# Patient Record
Sex: FEMALE | Race: WHITE | NOT HISPANIC OR LATINO | Employment: FULL TIME | URBAN - METROPOLITAN AREA
[De-identification: names, ages, dates, MRNs, and addresses within clinical notes are randomized per-mention and may not be internally consistent; named-entity substitution may affect disease eponyms.]

---

## 2017-07-17 ENCOUNTER — OFFICE VISIT (OUTPATIENT)
Dept: INTERNAL MEDICINE | Facility: CLINIC | Age: 27
End: 2017-07-17
Payer: MEDICAID

## 2017-07-17 VITALS
BODY MASS INDEX: 39.92 KG/M2 | RESPIRATION RATE: 16 BRPM | HEIGHT: 62 IN | HEART RATE: 95 BPM | WEIGHT: 216.94 LBS | SYSTOLIC BLOOD PRESSURE: 128 MMHG | TEMPERATURE: 98 F | DIASTOLIC BLOOD PRESSURE: 70 MMHG

## 2017-07-17 DIAGNOSIS — Z13.220 SCREENING FOR HYPERLIPIDEMIA: ICD-10-CM

## 2017-07-17 DIAGNOSIS — Z00.00 HEALTHCARE MAINTENANCE: ICD-10-CM

## 2017-07-17 DIAGNOSIS — E07.9 THYROID DISEASE: Primary | ICD-10-CM

## 2017-07-17 PROCEDURE — 99999 PR PBB SHADOW E&M-NEW PATIENT-LVL III: CPT | Mod: PBBFAC,,, | Performed by: NURSE PRACTITIONER

## 2017-07-17 PROCEDURE — 99203 OFFICE O/P NEW LOW 30 MIN: CPT | Mod: PBBFAC | Performed by: NURSE PRACTITIONER

## 2017-07-17 PROCEDURE — 99203 OFFICE O/P NEW LOW 30 MIN: CPT | Mod: S$PBB,,, | Performed by: NURSE PRACTITIONER

## 2017-07-17 RX ORDER — OMEPRAZOLE 20 MG/1
20 CAPSULE, DELAYED RELEASE ORAL DAILY
COMMUNITY
End: 2017-09-20 | Stop reason: SDUPTHER

## 2017-07-17 RX ORDER — MONTELUKAST SODIUM 10 MG/1
10 TABLET ORAL NIGHTLY
COMMUNITY
End: 2021-01-25

## 2017-07-17 NOTE — PROGRESS NOTES
Subjective:       Patient ID: Cortez Rollins is a 26 y.o. female.    Chief Complaint: Establish Care and Depression    HPI: Pt presents to clinic today new to me and clinic. She reports that she is here to get established. She reports that she has had issues with elevated thyroid then low thyroid but has not had it checked in 4 months since she moved. She was having it checked once a month. C/o wt gain, hair loss and anxiety.   Review of Systems   Constitutional: Negative for chills, fatigue, fever and unexpected weight change.   HENT: Negative for congestion, ear pain, sore throat and trouble swallowing.    Eyes: Negative for pain and visual disturbance.   Respiratory: Negative for cough, chest tightness and shortness of breath.    Cardiovascular: Negative for chest pain, palpitations and leg swelling.   Gastrointestinal: Negative for abdominal distention, abdominal pain, constipation, diarrhea and vomiting.   Genitourinary: Negative for difficulty urinating, dysuria, flank pain, frequency and hematuria.   Musculoskeletal: Negative for back pain, gait problem, joint swelling, neck pain and neck stiffness.   Skin: Negative for rash and wound.   Neurological: Negative for dizziness, seizures, speech difficulty, light-headedness and headaches.       Objective:      Physical Exam   Constitutional: She is oriented to person, place, and time. She appears well-developed and well-nourished.   HENT:   Head: Normocephalic and atraumatic.   Right Ear: External ear normal.   Left Ear: External ear normal.   Nose: Nose normal.   Mouth/Throat: Oropharynx is clear and moist.   Eyes: Conjunctivae and EOM are normal. Pupils are equal, round, and reactive to light.   Neck: Normal range of motion. Neck supple.   Cardiovascular: Normal rate, regular rhythm, normal heart sounds and intact distal pulses.    Pulmonary/Chest: Effort normal and breath sounds normal.   Abdominal: Soft. Bowel sounds are normal.   Musculoskeletal: Normal range of  motion.   Neurological: She is alert and oriented to person, place, and time.   Skin: Skin is warm and dry.   Psychiatric: She has a normal mood and affect. Her behavior is normal. Judgment and thought content normal.   Nursing note and vitals reviewed.      Assessment:       1. Thyroid disease    2. Healthcare maintenance    3. Screening for hyperlipidemia        Plan:   Cortez was seen today for establish care and depression.    Diagnoses and all orders for this visit:    Thyroid disease  -     TSH; Future  -     T4, free; Future  -     T3, free; Future    Healthcare maintenance  -     CBC auto differential; Future  -     Comprehensive metabolic panel; Future    Screening for hyperlipidemia  -     Lipid panel; Future    F/u 2 weeks for lab review, consider Wellbutrin for smoking cessation and anxiety.

## 2017-07-24 ENCOUNTER — LAB VISIT (OUTPATIENT)
Dept: LAB | Facility: HOSPITAL | Age: 27
End: 2017-07-24
Attending: NURSE PRACTITIONER
Payer: MEDICAID

## 2017-07-24 DIAGNOSIS — Z13.220 SCREENING FOR HYPERLIPIDEMIA: ICD-10-CM

## 2017-07-24 DIAGNOSIS — E07.9 THYROID DISEASE: ICD-10-CM

## 2017-07-24 DIAGNOSIS — Z00.00 HEALTHCARE MAINTENANCE: ICD-10-CM

## 2017-07-24 LAB
ALBUMIN SERPL BCP-MCNC: 3.5 G/DL
ALP SERPL-CCNC: 57 U/L
ALT SERPL W/O P-5'-P-CCNC: 18 U/L
ANION GAP SERPL CALC-SCNC: 7 MMOL/L
AST SERPL-CCNC: 12 U/L
BASOPHILS # BLD AUTO: 0.02 K/UL
BASOPHILS NFR BLD: 0.2 %
BILIRUB SERPL-MCNC: 0.6 MG/DL
BUN SERPL-MCNC: 9 MG/DL
CALCIUM SERPL-MCNC: 9 MG/DL
CHLORIDE SERPL-SCNC: 109 MMOL/L
CHOLEST/HDLC SERPL: 5.7 {RATIO}
CO2 SERPL-SCNC: 24 MMOL/L
CREAT SERPL-MCNC: 0.6 MG/DL
DIFFERENTIAL METHOD: NORMAL
EOSINOPHIL # BLD AUTO: 0.1 K/UL
EOSINOPHIL NFR BLD: 1.5 %
ERYTHROCYTE [DISTWIDTH] IN BLOOD BY AUTOMATED COUNT: 12.7 %
EST. GFR  (AFRICAN AMERICAN): >60 ML/MIN/1.73 M^2
EST. GFR  (NON AFRICAN AMERICAN): >60 ML/MIN/1.73 M^2
GLUCOSE SERPL-MCNC: 98 MG/DL
HCT VFR BLD AUTO: 42.5 %
HDL/CHOLESTEROL RATIO: 17.6 %
HDLC SERPL-MCNC: 187 MG/DL
HDLC SERPL-MCNC: 33 MG/DL
HGB BLD-MCNC: 14.4 G/DL
LDLC SERPL CALC-MCNC: 132 MG/DL
LYMPHOCYTES # BLD AUTO: 2.9 K/UL
LYMPHOCYTES NFR BLD: 31.2 %
MCH RBC QN AUTO: 30.6 PG
MCHC RBC AUTO-ENTMCNC: 33.9 G/DL
MCV RBC AUTO: 90 FL
MONOCYTES # BLD AUTO: 0.8 K/UL
MONOCYTES NFR BLD: 9 %
NEUTROPHILS # BLD AUTO: 5.4 K/UL
NEUTROPHILS NFR BLD: 58.1 %
NONHDLC SERPL-MCNC: 154 MG/DL
PLATELET # BLD AUTO: 194 K/UL
PMV BLD AUTO: 10.9 FL
POTASSIUM SERPL-SCNC: 3.7 MMOL/L
PROT SERPL-MCNC: 6.4 G/DL
RBC # BLD AUTO: 4.7 M/UL
SODIUM SERPL-SCNC: 140 MMOL/L
T3FREE SERPL-MCNC: 3.1 PG/ML
T4 FREE SERPL-MCNC: 1.04 NG/DL
TRIGL SERPL-MCNC: 110 MG/DL
TSH SERPL DL<=0.005 MIU/L-ACNC: 0.26 UIU/ML
WBC # BLD AUTO: 9.26 K/UL

## 2017-07-24 PROCEDURE — 36415 COLL VENOUS BLD VENIPUNCTURE: CPT

## 2017-07-24 PROCEDURE — 80053 COMPREHEN METABOLIC PANEL: CPT

## 2017-07-24 PROCEDURE — 85025 COMPLETE CBC W/AUTO DIFF WBC: CPT

## 2017-07-24 PROCEDURE — 84439 ASSAY OF FREE THYROXINE: CPT

## 2017-07-24 PROCEDURE — 84481 FREE ASSAY (FT-3): CPT

## 2017-07-24 PROCEDURE — 80061 LIPID PANEL: CPT

## 2017-07-24 PROCEDURE — 84443 ASSAY THYROID STIM HORMONE: CPT

## 2017-07-27 ENCOUNTER — HOSPITAL ENCOUNTER (EMERGENCY)
Facility: HOSPITAL | Age: 27
Discharge: HOME | End: 2017-07-27
Attending: SURGERY
Payer: MEDICAID

## 2017-07-27 VITALS
SYSTOLIC BLOOD PRESSURE: 118 MMHG | WEIGHT: 214 LBS | DIASTOLIC BLOOD PRESSURE: 66 MMHG | TEMPERATURE: 98 F | HEART RATE: 110 BPM | RESPIRATION RATE: 20 BRPM | BODY MASS INDEX: 39.14 KG/M2

## 2017-07-27 DIAGNOSIS — K60.2 ANAL FISSURE: Primary | ICD-10-CM

## 2017-07-27 PROCEDURE — 63600175 PHARM REV CODE 636 W HCPCS: Performed by: SURGERY

## 2017-07-27 PROCEDURE — 96372 THER/PROPH/DIAG INJ SC/IM: CPT

## 2017-07-27 PROCEDURE — 99283 EMERGENCY DEPT VISIT LOW MDM: CPT | Mod: 25

## 2017-07-27 RX ORDER — ONDANSETRON 2 MG/ML
4 INJECTION INTRAMUSCULAR; INTRAVENOUS
Status: COMPLETED | OUTPATIENT
Start: 2017-07-27 | End: 2017-07-27

## 2017-07-27 RX ORDER — HYDROCORTISONE 25 MG/G
CREAM TOPICAL 2 TIMES DAILY
Qty: 1 TUBE | Refills: 0 | Status: SHIPPED | OUTPATIENT
Start: 2017-07-27 | End: 2017-08-06

## 2017-07-27 RX ORDER — METRONIDAZOLE 500 MG/1
500 TABLET ORAL 4 TIMES DAILY
Qty: 28 TABLET | Refills: 0 | Status: SHIPPED | OUTPATIENT
Start: 2017-07-27 | End: 2017-07-31 | Stop reason: SDUPTHER

## 2017-07-27 RX ORDER — HYDROCODONE BITARTRATE AND ACETAMINOPHEN 5; 325 MG/1; MG/1
1 TABLET ORAL EVERY 4 HOURS PRN
Qty: 8 TABLET | Refills: 0 | Status: SHIPPED | OUTPATIENT
Start: 2017-07-27 | End: 2017-08-06

## 2017-07-27 RX ORDER — DOCUSATE SODIUM 100 MG/1
100 CAPSULE, LIQUID FILLED ORAL 2 TIMES DAILY PRN
Qty: 30 CAPSULE | Refills: 0 | Status: SHIPPED | OUTPATIENT
Start: 2017-07-27 | End: 2017-08-17

## 2017-07-27 RX ORDER — HYDROMORPHONE HYDROCHLORIDE 1 MG/ML
1 INJECTION, SOLUTION INTRAMUSCULAR; INTRAVENOUS; SUBCUTANEOUS
Status: COMPLETED | OUTPATIENT
Start: 2017-07-27 | End: 2017-07-27

## 2017-07-27 RX ORDER — IBUPROFEN 600 MG/1
600 TABLET ORAL 3 TIMES DAILY
COMMUNITY
End: 2017-10-18

## 2017-07-27 RX ADMIN — ONDANSETRON 4 MG: 2 INJECTION INTRAMUSCULAR; INTRAVENOUS at 09:07

## 2017-07-27 RX ADMIN — HYDROMORPHONE HYDROCHLORIDE 1 MG: 1 INJECTION, SOLUTION INTRAMUSCULAR; INTRAVENOUS; SUBCUTANEOUS at 09:07

## 2017-07-27 NOTE — ED PROVIDER NOTES
[facility]                                        July 27, 2017                   Chief Complaint  26 y.o. female with Rectal Pain    History of Present Illness  Cortez Rollins presents to the emergency room with rectal pain for last 2 days  Patient had anal intercourse 2 days ago with rectal pain shortly afterwards  Patient on exam has a small anal fissure, painful on evaluation in the ER now  Patient has no bleeding, no signs of abscess or cellulitis, no mass or lesion  Patient has normal sphincter tone with no obvious hemorrhoids and evaluation  Patient reports normal bowel movements, afebrile and stable on presentation     The history is provided by the patient  Medical history: ALLERGY, GERD, thyroid dysfunction  Surgical history: Adenoidectomy, tonsillectomy, cholecystectomy  No Known Allergies     Review of Systems and Physical Exam     Review of Systems  -- Constitution - no fever, denies fatigue, no weakness, no chills  -- Eyes - no tearing or redness, no visual disturbance  -- Ear, Nose - no tinnitus or earache, no nasal congestion or discharge  -- Mouth,Throat - no sore throat, no toothache, normal voice, normal swallowing  -- Respiratory - denies cough and congestion, no shortness of breath, no PETERSON  -- Cardiovascular - denies chest pain, no palpitations, denies claudication  -- Gastrointestinal - rectal pain after anal intercourse 2 days ago  -- Genitourinary - no dysuria, no denies flank pain, no hematuria or frequency   -- Musculoskeletal - denies back pain, negative for myalgias and arthralgias   -- Neurological - no headache, denies weakness or seizure; no LOC  -- Skin - denies pallor, rash, or changes in skin. no hives or welts noted    Vital Signs  -- Her oral temperature is 98.4 °F (36.9 °C).   -- Her blood pressure is 118/66 and her pulse is 110.   -- Her respiration is 20.      Physical Exam  -- Nursing note and vitals reviewed  -- Constitutional: Appears well-developed and  well-nourished  -- Head: Atraumatic. Normocephalic. No obvious abnormality  -- Eyes: Pupils are equal and reactive to light. Normal conjunctiva and lids  -- Cardiac: Normal rate, regular rhythm and normal heart sounds  -- Pulmonary: Normal respiratory effort, breath sounds clear to auscultation  -- Abdominal: Soft, no tenderness. Normal bowel sounds. Normal liver edge  -- Rectal: Anal fissure noted with no active bleeding, no mass or hemorrhoids or bleeding  -- Musculoskeletal: Normal range of motion, no effusions. Joints stable   -- Neurological: No focal deficits. Showed good interaction with staff  -- Vascular: Posterior tibial, dorsalis pedis and radial pulses 2+ bilaterally      Emergency Room Course     Treatment and Evaluation  -- IM 1 mg Dilaudid given today in the ER  -- IM 4 mg Zofran given today in the ER     Diagnosis  -- The encounter diagnosis was Anal fissure.    Disposition and Plan  -- Disposition: home  -- Condition: stable  -- Follow-up: Patient to follow up with Emilia Davis NP at appointment  -- I advised the patient that we have found no life threatening condition today  -- At this time, I believe the patient is clinically stable for discharge.   -- The patient acknowledges that close follow up with a MD is required   -- Patient agrees to comply with all instruction and direction given in the ER    This note is dictated on Dragon Natural Speaking word recognition program.  There are word recognition mistakes that are occasionally missed on review.           Flash Campa MD  07/27/17 0939

## 2017-07-27 NOTE — ED NOTES
No s/s adverse reaction to medications given.  Discharge instructions and rx x4 given, patient and fiance voiced understanding.  Discharged to home in stable condition, ambulatory to discharge window w steady gait, NAD.

## 2017-07-28 ENCOUNTER — PATIENT MESSAGE (OUTPATIENT)
Dept: INTERNAL MEDICINE | Facility: CLINIC | Age: 27
End: 2017-07-28

## 2017-07-28 NOTE — TELEPHONE ENCOUNTER
Yes she can. I see Ibuprofen 600mg TID on her medication list. She can take this as prescribed. Can use her Norco before bedtime if nothing else for now. Thanks    If still bad on Monday let Emilia know. Maybe she could do something like tramadol that still can cause sedation but maybe won't be as strong...

## 2017-07-29 ENCOUNTER — HOSPITAL ENCOUNTER (EMERGENCY)
Facility: HOSPITAL | Age: 27
Discharge: HOME | End: 2017-07-30
Attending: SURGERY
Payer: MEDICAID

## 2017-07-29 VITALS
RESPIRATION RATE: 18 BRPM | BODY MASS INDEX: 39.14 KG/M2 | HEART RATE: 103 BPM | TEMPERATURE: 99 F | WEIGHT: 214 LBS | DIASTOLIC BLOOD PRESSURE: 66 MMHG | SYSTOLIC BLOOD PRESSURE: 120 MMHG

## 2017-07-29 DIAGNOSIS — K62.89 PROCTITIS: Primary | ICD-10-CM

## 2017-07-29 LAB
ALBUMIN SERPL BCP-MCNC: 3.4 G/DL
ALP SERPL-CCNC: 47 U/L
ALT SERPL W/O P-5'-P-CCNC: 17 U/L
ANION GAP SERPL CALC-SCNC: 10 MMOL/L
AST SERPL-CCNC: 14 U/L
BASOPHILS # BLD AUTO: 0.02 K/UL
BASOPHILS NFR BLD: 0.3 %
BILIRUB SERPL-MCNC: 0.3 MG/DL
BUN SERPL-MCNC: 12 MG/DL
CALCIUM SERPL-MCNC: 8.7 MG/DL
CHLORIDE SERPL-SCNC: 107 MMOL/L
CO2 SERPL-SCNC: 24 MMOL/L
CREAT SERPL-MCNC: 0.7 MG/DL
DIFFERENTIAL METHOD: NORMAL
EOSINOPHIL # BLD AUTO: 0.1 K/UL
EOSINOPHIL NFR BLD: 1.4 %
ERYTHROCYTE [DISTWIDTH] IN BLOOD BY AUTOMATED COUNT: 12.7 %
EST. GFR  (AFRICAN AMERICAN): >60 ML/MIN/1.73 M^2
EST. GFR  (NON AFRICAN AMERICAN): >60 ML/MIN/1.73 M^2
GLUCOSE SERPL-MCNC: 97 MG/DL
HCT VFR BLD AUTO: 40.1 %
HGB BLD-MCNC: 13.5 G/DL
LIPASE SERPL-CCNC: 42 U/L
LYMPHOCYTES # BLD AUTO: 1.9 K/UL
LYMPHOCYTES NFR BLD: 23.9 %
MCH RBC QN AUTO: 30.5 PG
MCHC RBC AUTO-ENTMCNC: 33.7 G/DL
MCV RBC AUTO: 91 FL
MONOCYTES # BLD AUTO: 1 K/UL
MONOCYTES NFR BLD: 12.1 %
NEUTROPHILS # BLD AUTO: 4.9 K/UL
NEUTROPHILS NFR BLD: 62.3 %
PLATELET # BLD AUTO: 156 K/UL
PMV BLD AUTO: 11.5 FL
POTASSIUM SERPL-SCNC: 3.1 MMOL/L
PROT SERPL-MCNC: 6.4 G/DL
RBC # BLD AUTO: 4.42 M/UL
SODIUM SERPL-SCNC: 141 MMOL/L
WBC # BLD AUTO: 7.87 K/UL

## 2017-07-29 PROCEDURE — 83690 ASSAY OF LIPASE: CPT

## 2017-07-29 PROCEDURE — 96374 THER/PROPH/DIAG INJ IV PUSH: CPT

## 2017-07-29 PROCEDURE — 85025 COMPLETE CBC W/AUTO DIFF WBC: CPT

## 2017-07-29 PROCEDURE — 99284 EMERGENCY DEPT VISIT MOD MDM: CPT | Mod: 25

## 2017-07-29 PROCEDURE — 51702 INSERT TEMP BLADDER CATH: CPT

## 2017-07-29 PROCEDURE — 36415 COLL VENOUS BLD VENIPUNCTURE: CPT

## 2017-07-29 PROCEDURE — 80053 COMPREHEN METABOLIC PANEL: CPT

## 2017-07-29 RX ORDER — HYDROMORPHONE HYDROCHLORIDE 2 MG/ML
2 INJECTION, SOLUTION INTRAMUSCULAR; INTRAVENOUS; SUBCUTANEOUS
Status: COMPLETED | OUTPATIENT
Start: 2017-07-29 | End: 2017-07-30

## 2017-07-29 RX ORDER — SODIUM CHLORIDE 9 MG/ML
1000 INJECTION, SOLUTION INTRAVENOUS
Status: COMPLETED | OUTPATIENT
Start: 2017-07-29 | End: 2017-07-30

## 2017-07-29 RX ORDER — ONDANSETRON 2 MG/ML
4 INJECTION INTRAMUSCULAR; INTRAVENOUS
Status: COMPLETED | OUTPATIENT
Start: 2017-07-29 | End: 2017-07-30

## 2017-07-29 RX ADMIN — IOHEXOL 30 ML: 350 INJECTION, SOLUTION INTRAVENOUS at 11:07

## 2017-07-30 LAB
AMORPH CRY URNS QL MICRO: NORMAL
B-HCG UR QL: NEGATIVE
BACTERIA #/AREA URNS HPF: NORMAL /HPF
BILIRUB UR QL STRIP: ABNORMAL
CLARITY UR: CLEAR
COLOR UR: ABNORMAL
GLUCOSE UR QL STRIP: NEGATIVE
HGB UR QL STRIP: ABNORMAL
HYALINE CASTS #/AREA URNS LPF: 0 /LPF
KETONES UR QL STRIP: ABNORMAL
LEUKOCYTE ESTERASE UR QL STRIP: ABNORMAL
MICROSCOPIC COMMENT: NORMAL
NITRITE UR QL STRIP: POSITIVE
PH UR STRIP: 7 [PH] (ref 5–8)
PROT UR QL STRIP: ABNORMAL
RBC #/AREA URNS HPF: 2 /HPF (ref 0–4)
SP GR UR STRIP: 1.02 (ref 1–1.03)
SQUAMOUS #/AREA URNS HPF: 2 /HPF
URN SPEC COLLECT METH UR: ABNORMAL
UROBILINOGEN UR STRIP-ACNC: NEGATIVE EU/DL
WBC #/AREA URNS HPF: 2 /HPF (ref 0–5)

## 2017-07-30 PROCEDURE — 63600175 PHARM REV CODE 636 W HCPCS: Performed by: SURGERY

## 2017-07-30 PROCEDURE — 81000 URINALYSIS NONAUTO W/SCOPE: CPT

## 2017-07-30 PROCEDURE — 25500020 PHARM REV CODE 255: Performed by: INTERNAL MEDICINE

## 2017-07-30 PROCEDURE — 25000003 PHARM REV CODE 250: Performed by: SURGERY

## 2017-07-30 PROCEDURE — 81025 URINE PREGNANCY TEST: CPT

## 2017-07-30 RX ORDER — HYDROCODONE BITARTRATE AND ACETAMINOPHEN 5; 325 MG/1; MG/1
TABLET ORAL
Status: DISCONTINUED
Start: 2017-07-30 | End: 2017-07-30 | Stop reason: HOSPADM

## 2017-07-30 RX ORDER — PREDNISONE 20 MG/1
TABLET ORAL
Status: DISCONTINUED
Start: 2017-07-30 | End: 2017-07-30 | Stop reason: HOSPADM

## 2017-07-30 RX ORDER — METRONIDAZOLE 500 MG/1
TABLET ORAL
Status: DISCONTINUED
Start: 2017-07-30 | End: 2017-07-30 | Stop reason: HOSPADM

## 2017-07-30 RX ADMIN — SODIUM CHLORIDE 1000 ML: 0.9 INJECTION, SOLUTION INTRAVENOUS at 12:07

## 2017-07-30 RX ADMIN — IOHEXOL 100 ML: 350 INJECTION, SOLUTION INTRAVENOUS at 01:07

## 2017-07-30 RX ADMIN — ONDANSETRON 4 MG: 2 INJECTION INTRAMUSCULAR; INTRAVENOUS at 12:07

## 2017-07-30 RX ADMIN — HYDROMORPHONE HYDROCHLORIDE 2 MG: 2 INJECTION INTRAMUSCULAR; INTRAVENOUS; SUBCUTANEOUS at 12:07

## 2017-07-30 NOTE — ED TRIAGE NOTES
"Dx w/ anal fissure 7/27. Pt states pressure traveling more towards vagina now.  Reports urine turned brown with "particles" starting last night. Starting around noon today pt has urinary retention, states can only urinate when laying in tub in hot water.  "

## 2017-07-30 NOTE — ED PROVIDER NOTES
Encounter Date: 7/29/2017       History     Chief Complaint    Urinary Retention    Female  Problem     HPI   Cortez Rollins is a 26 y.o. female presents with anal pain and urinary retention today  Patient had anal intercourse 3 days ago with residual sphincter pain after the event  Patient was seen in the emergency room prescribed antibiotics and pain medication  Patient with continued pain, now has dark urine with bladder pressure as well now  Patient states that she has particulate matter in her urine, cannot urinate tonight    The history is provided by the patient  Medical history: ALLERGY, GERD, thyroid dysfunction  Surgical history: Adenoidectomy, tonsillectomy, cholecystectomy  No Known Allergies      Review of Systems and Physical Exam     Review of Systems   · Constitution - no fever, denies fatigue, no weakness, stable weight  · Eyes - no tearing or redness, no change in vision, no double vision  · Ear, Nose - no tinnitus or earache, no nasal congestion or discharge  · Mouth,Throat - no sore throat, no toothache, denies dysphagia, normal swallowing  · Respiratory - denies cough and sputum, no shortness of breath, no PETERSON, no wheeze  · Cardiovascular - denies chest pain, no palpitations, denies claudication. No orthopnea   · Gastrointestinal - abdominal pain, nausea, vomiting, diarrhea, or constipation.   · Genitourinary - dark urine, urinary retention, denies flank pain  · Musculoskeletal - denies muscle or joint pain, back pain  · Skin - denies rash or changes in skin, no hives or welts  · Neurological - no headache, denies weakness or seizure; no LOC    BP Pulse Resp Temp SpO2   120/66 103 18 98.7 °F (37.1 °C) --     Physical Exam   · Constitution - no fever, denies fatigue, no weakness, stable weight  · Eyes - no tearing or redness, no change in vision, no double vision  · Ear, Nose - no tinnitus or earache, no nasal congestion or discharge  · Mouth,Throat - no sore throat, no toothache, denies  dysphagia, normal swallowing  · Respiratory - denies cough and sputum, no shortness of breath, no PETERSON, no wheeze  · Cardiovascular - denies chest pain, no palpitations, denies claudication. No orthopnea   · Gastrointestinal - denies abdominal pain, nausea, vomiting, diarrhea, or constipation.   · Genitourinary - denies flank pain and dysuria, also denies frequency or urgency  · Musculoskeletal - denies muscle or joint pain, back pain  · Skin - denies rash or changes in skin, no hives or welts  · Neurological - no headache, denies weakness or seizure; no LOC    ED Course   Procedures  Labs Reviewed   COMPREHENSIVE METABOLIC PANEL - Abnormal; Notable for the following:        Result Value    Potassium 3.1 (*)     Albumin 3.4 (*)     Alkaline Phosphatase 47 (*)     All other components within normal limits   URINALYSIS - Abnormal; Notable for the following:     Protein, UA 1+ (*)     Ketones, UA 2+ (*)     Bilirubin (UA) 1+ (*)     Occult Blood UA Trace (*)     Nitrite, UA Positive (*)     Leukocytes, UA Trace (*)     All other components within normal limits   CBC W/ AUTO DIFFERENTIAL   LIPASE   PREGNANCY TEST, URINE RAPID   URINALYSIS MICROSCOPIC                               ED Course     Clinical Impression:   The encounter diagnosis was Proctitis.          Change of Shift  -- This patient was taken over by Dr. Ocampo at shift change  -- Workup is pending, oncoming physician will follow-up and give disposition  -- Pt is completely stable and appropriate handoff was given by the outgoing MD Flash Campa MD  07/30/17 3181

## 2017-07-31 ENCOUNTER — OFFICE VISIT (OUTPATIENT)
Dept: INTERNAL MEDICINE | Facility: CLINIC | Age: 27
End: 2017-07-31
Payer: MEDICAID

## 2017-07-31 ENCOUNTER — LAB VISIT (OUTPATIENT)
Dept: LAB | Facility: HOSPITAL | Age: 27
End: 2017-07-31
Attending: NURSE PRACTITIONER
Payer: MEDICAID

## 2017-07-31 ENCOUNTER — TELEPHONE (OUTPATIENT)
Dept: INTERNAL MEDICINE | Facility: CLINIC | Age: 27
End: 2017-07-31

## 2017-07-31 VITALS
TEMPERATURE: 98 F | DIASTOLIC BLOOD PRESSURE: 60 MMHG | RESPIRATION RATE: 20 BRPM | BODY MASS INDEX: 39.76 KG/M2 | HEART RATE: 94 BPM | SYSTOLIC BLOOD PRESSURE: 110 MMHG | WEIGHT: 216.06 LBS | HEIGHT: 62 IN

## 2017-07-31 DIAGNOSIS — R31.9 URINARY TRACT INFECTION WITH HEMATURIA, SITE UNSPECIFIED: ICD-10-CM

## 2017-07-31 DIAGNOSIS — K60.2 ANAL FISSURE: ICD-10-CM

## 2017-07-31 DIAGNOSIS — N39.0 URINARY TRACT INFECTION WITH HEMATURIA, SITE UNSPECIFIED: ICD-10-CM

## 2017-07-31 DIAGNOSIS — K62.89 PROCTITIS: ICD-10-CM

## 2017-07-31 DIAGNOSIS — K62.89 PROCTITIS: Primary | ICD-10-CM

## 2017-07-31 LAB
ALBUMIN SERPL BCP-MCNC: 3.4 G/DL
ALP SERPL-CCNC: 46 U/L
ALT SERPL W/O P-5'-P-CCNC: 12 U/L
ANION GAP SERPL CALC-SCNC: 13 MMOL/L
AST SERPL-CCNC: 11 U/L
BACTERIA #/AREA URNS HPF: NORMAL /HPF
BASOPHILS # BLD AUTO: 0.03 K/UL
BASOPHILS NFR BLD: 0.4 %
BILIRUB SERPL-MCNC: 0.4 MG/DL
BILIRUB UR QL STRIP: ABNORMAL
BUN SERPL-MCNC: 4 MG/DL
CALCIUM SERPL-MCNC: 8.7 MG/DL
CHLORIDE SERPL-SCNC: 107 MMOL/L
CLARITY UR: CLEAR
CO2 SERPL-SCNC: 21 MMOL/L
COLOR UR: YELLOW
CREAT SERPL-MCNC: 0.7 MG/DL
DIFFERENTIAL METHOD: NORMAL
EOSINOPHIL # BLD AUTO: 0 K/UL
EOSINOPHIL NFR BLD: 0.5 %
ERYTHROCYTE [DISTWIDTH] IN BLOOD BY AUTOMATED COUNT: 13 %
EST. GFR  (AFRICAN AMERICAN): >60 ML/MIN/1.73 M^2
EST. GFR  (NON AFRICAN AMERICAN): >60 ML/MIN/1.73 M^2
GLUCOSE SERPL-MCNC: 88 MG/DL
GLUCOSE UR QL STRIP: NEGATIVE
HCT VFR BLD AUTO: 39.2 %
HGB BLD-MCNC: 12.9 G/DL
HGB UR QL STRIP: NEGATIVE
HYALINE CASTS #/AREA URNS LPF: 0 /LPF
KETONES UR QL STRIP: ABNORMAL
LEUKOCYTE ESTERASE UR QL STRIP: ABNORMAL
LYMPHOCYTES # BLD AUTO: 1.7 K/UL
LYMPHOCYTES NFR BLD: 22.1 %
MCH RBC QN AUTO: 30.2 PG
MCHC RBC AUTO-ENTMCNC: 32.9 G/DL
MCV RBC AUTO: 92 FL
MICROSCOPIC COMMENT: NORMAL
MONOCYTES # BLD AUTO: 1 K/UL
MONOCYTES NFR BLD: 13.3 %
NEUTROPHILS # BLD AUTO: 4.8 K/UL
NEUTROPHILS NFR BLD: 63.7 %
NITRITE UR QL STRIP: POSITIVE
PH UR STRIP: 6 [PH] (ref 5–8)
PLATELET # BLD AUTO: 151 K/UL
PMV BLD AUTO: 12.2 FL
POTASSIUM SERPL-SCNC: 3.6 MMOL/L
PROT SERPL-MCNC: 6.5 G/DL
PROT UR QL STRIP: ABNORMAL
RBC # BLD AUTO: 4.27 M/UL
RBC #/AREA URNS HPF: 0 /HPF (ref 0–4)
SODIUM SERPL-SCNC: 141 MMOL/L
SP GR UR STRIP: 1.02 (ref 1–1.03)
URN SPEC COLLECT METH UR: ABNORMAL
UROBILINOGEN UR STRIP-ACNC: NEGATIVE EU/DL
WBC # BLD AUTO: 7.47 K/UL
WBC #/AREA URNS HPF: 5 /HPF (ref 0–5)

## 2017-07-31 PROCEDURE — 85025 COMPLETE CBC W/AUTO DIFF WBC: CPT

## 2017-07-31 PROCEDURE — 87077 CULTURE AEROBIC IDENTIFY: CPT

## 2017-07-31 PROCEDURE — 81000 URINALYSIS NONAUTO W/SCOPE: CPT

## 2017-07-31 PROCEDURE — 87040 BLOOD CULTURE FOR BACTERIA: CPT

## 2017-07-31 PROCEDURE — 87186 SC STD MICRODIL/AGAR DIL: CPT

## 2017-07-31 PROCEDURE — 36415 COLL VENOUS BLD VENIPUNCTURE: CPT

## 2017-07-31 PROCEDURE — 87086 URINE CULTURE/COLONY COUNT: CPT

## 2017-07-31 PROCEDURE — 87088 URINE BACTERIA CULTURE: CPT

## 2017-07-31 PROCEDURE — 80053 COMPREHEN METABOLIC PANEL: CPT

## 2017-07-31 PROCEDURE — 99999 PR PBB SHADOW E&M-EST. PATIENT-LVL IV: CPT | Mod: PBBFAC,,, | Performed by: NURSE PRACTITIONER

## 2017-07-31 PROCEDURE — 99214 OFFICE O/P EST MOD 30 MIN: CPT | Mod: S$PBB,,, | Performed by: NURSE PRACTITIONER

## 2017-07-31 RX ORDER — HYDROCODONE BITARTRATE AND ACETAMINOPHEN 7.5; 325 MG/1; MG/1
1 TABLET ORAL EVERY 6 HOURS PRN
Qty: 30 TABLET | Refills: 0 | Status: SHIPPED | OUTPATIENT
Start: 2017-07-31 | End: 2017-08-17

## 2017-07-31 RX ORDER — CIPROFLOXACIN 500 MG/1
500 TABLET ORAL EVERY 12 HOURS
Qty: 28 TABLET | Refills: 0 | Status: SHIPPED | OUTPATIENT
Start: 2017-07-31 | End: 2017-08-17

## 2017-07-31 RX ORDER — METRONIDAZOLE 500 MG/1
500 TABLET ORAL 4 TIMES DAILY
Qty: 28 TABLET | Refills: 0 | Status: SHIPPED | OUTPATIENT
Start: 2017-07-31 | End: 2017-08-07

## 2017-07-31 NOTE — TELEPHONE ENCOUNTER
----- Message from Callie Goddard sent at 2017  1:58 PM CDT -----  Contact: self  Cortez Rollins  MRN: [id number]  : 1990  PCP: Emilia Davis  Home Phone      [phone]  Work Phone      Not on file.  [email or web address]          [phone]      MESSAGE: test results--------[phone]

## 2017-07-31 NOTE — PROGRESS NOTES
Subjective:       Patient ID: Cortez Rollins is a 26 y.o. female.    Chief Complaint: Anal Fissure (ER Follow up)    HPI: Pt presents to clinic today known to me for f/u from ER. She report sthat she was not able to get the other abx from the pharmacy. Only on flagyl from her recent ER visits. She was treated for anal fissure on the 27 after anal sex on the 25. She reports that she then started with urinary retention on the 29. She is here now with c/o anal pain and urine still dark. Not eating much because scared to have BM. She is currently taking norco and stool softener. Also flagyl. She deneis fever. Reviewed CT scan as well as labs.   Review of Systems   Constitutional: Positive for activity change. Negative for unexpected weight change.   HENT: Negative for hearing loss, rhinorrhea and trouble swallowing.    Eyes: Negative for discharge and visual disturbance.   Respiratory: Negative for chest tightness and wheezing.    Cardiovascular: Negative for chest pain and palpitations.   Gastrointestinal: Positive for blood in stool and diarrhea. Negative for constipation and vomiting.   Endocrine: Negative for polydipsia and polyuria.   Genitourinary: Positive for difficulty urinating and dysuria. Negative for hematuria and menstrual problem.   Musculoskeletal: Negative for arthralgias, joint swelling and neck pain.   Neurological: Positive for weakness and headaches.   Psychiatric/Behavioral: Positive for dysphoric mood. Negative for confusion.       Objective:      Physical Exam   Constitutional: She is oriented to person, place, and time. She appears well-developed and well-nourished.   HENT:   Head: Normocephalic and atraumatic.   Cardiovascular: Normal rate, regular rhythm, normal heart sounds and intact distal pulses.    No murmur heard.  Pulmonary/Chest: Effort normal and breath sounds normal. No respiratory distress.   Abdominal: Soft. Bowel sounds are normal. She exhibits no distension and no mass. There is no  tenderness. There is no guarding.   Genitourinary:   Genitourinary Comments: Has small ulceration at  11 oclock outer anus. No abscess noted    Urine is dark   Neurological: She is alert and oriented to person, place, and time.   Skin: Skin is warm and dry.   Psychiatric: She has a normal mood and affect. Her behavior is normal. Judgment and thought content normal.   Nursing note and vitals reviewed.      Assessment:       1. Proctitis    2. Urinary tract infection with hematuria, site unspecified        Plan:   Cortez was seen today for anal fissure.    Diagnoses and all orders for this visit:    Proctitis  -     ciprofloxacin HCl (CIPRO) 500 MG tablet; Take 1 tablet (500 mg total) by mouth every 12 (twelve) hours.  -     metronidazole (FLAGYL) 500 MG tablet; Take 1 tablet (500 mg total) by mouth 4 (four) times daily.  -     hydrocodone-acetaminophen 7.5-325mg (NORCO) 7.5-325 mg per tablet; Take 1 tablet by mouth every 6 (six) hours as needed for Pain.    Urinary tract infection with hematuria, site unspecified  -     ciprofloxacin HCl (CIPRO) 500 MG tablet; Take 1 tablet (500 mg total) by mouth every 12 (twelve) hours.  -     URINALYSIS  -     CULTURE, URINE; Future    Will f/u Friday for lanza removal. ER for fever, chills, increased pain or other changes in condition. Eat soft diet, cont stool softeners. Cont Flagyl and cipro. Pain meds as needed. Sids baths PRN. Check cbc, cmp, u/a and culture with blood cultures. She would like to leave to go visit her daughter. I don't want her leaving town if she is not well. Check labs to assure she is getting better and not worse. Not pulling lanza today due to she has not been on anything but the flagyl. She will have the retention again if it was due to UTI

## 2017-08-02 ENCOUNTER — PATIENT MESSAGE (OUTPATIENT)
Dept: INTERNAL MEDICINE | Facility: CLINIC | Age: 27
End: 2017-08-02

## 2017-08-03 ENCOUNTER — HOSPITAL ENCOUNTER (EMERGENCY)
Facility: HOSPITAL | Age: 27
Discharge: HOME | End: 2017-08-03
Attending: EMERGENCY MEDICINE
Payer: MEDICAID

## 2017-08-03 ENCOUNTER — TELEPHONE (OUTPATIENT)
Dept: INTERNAL MEDICINE | Facility: CLINIC | Age: 27
End: 2017-08-03

## 2017-08-03 VITALS
TEMPERATURE: 98 F | RESPIRATION RATE: 16 BRPM | WEIGHT: 212 LBS | HEART RATE: 74 BPM | BODY MASS INDEX: 38.78 KG/M2 | SYSTOLIC BLOOD PRESSURE: 101 MMHG | DIASTOLIC BLOOD PRESSURE: 72 MMHG

## 2017-08-03 DIAGNOSIS — N39.0 URINARY TRACT INFECTION WITHOUT HEMATURIA, SITE UNSPECIFIED: Primary | ICD-10-CM

## 2017-08-03 LAB
ALBUMIN SERPL BCP-MCNC: 3.4 G/DL
ALP SERPL-CCNC: 45 U/L
ALT SERPL W/O P-5'-P-CCNC: 25 U/L
ANION GAP SERPL CALC-SCNC: 12 MMOL/L
AST SERPL-CCNC: 28 U/L
B-HCG UR QL: NEGATIVE
BACTERIA #/AREA URNS HPF: ABNORMAL /HPF
BACTERIA UR CULT: NORMAL
BASOPHILS # BLD AUTO: NORMAL K/UL
BASOPHILS NFR BLD: 0 %
BILIRUB SERPL-MCNC: 0.4 MG/DL
BILIRUB UR QL STRIP: ABNORMAL
BILIRUB UR QL STRIP: ABNORMAL
BUN SERPL-MCNC: 11 MG/DL
CALCIUM SERPL-MCNC: 9 MG/DL
CHLORIDE SERPL-SCNC: 106 MMOL/L
CLARITY UR: CLEAR
CLARITY UR: CLEAR
CO2 SERPL-SCNC: 24 MMOL/L
COLOR UR: YELLOW
COLOR UR: YELLOW
CREAT SERPL-MCNC: 0.7 MG/DL
DIFFERENTIAL METHOD: NORMAL
EOSINOPHIL # BLD AUTO: NORMAL K/UL
EOSINOPHIL NFR BLD: 1 %
ERYTHROCYTE [DISTWIDTH] IN BLOOD BY AUTOMATED COUNT: 12.7 %
EST. GFR  (AFRICAN AMERICAN): >60 ML/MIN/1.73 M^2
EST. GFR  (NON AFRICAN AMERICAN): >60 ML/MIN/1.73 M^2
GLUCOSE SERPL-MCNC: 101 MG/DL
GLUCOSE UR QL STRIP: NEGATIVE
GLUCOSE UR QL STRIP: NEGATIVE
HCT VFR BLD AUTO: 40.2 %
HGB BLD-MCNC: 13.3 G/DL
HGB UR QL STRIP: ABNORMAL
HGB UR QL STRIP: ABNORMAL
HYALINE CASTS #/AREA URNS LPF: 0 /LPF
KETONES UR QL STRIP: ABNORMAL
KETONES UR QL STRIP: ABNORMAL
LEUKOCYTE ESTERASE UR QL STRIP: NEGATIVE
LEUKOCYTE ESTERASE UR QL STRIP: NEGATIVE
LIPASE SERPL-CCNC: 14 U/L
LYMPHOCYTES # BLD AUTO: NORMAL K/UL
LYMPHOCYTES NFR BLD: 29 %
MCH RBC QN AUTO: 29.9 PG
MCHC RBC AUTO-ENTMCNC: 33.1 G/DL
MCV RBC AUTO: 90 FL
MICROSCOPIC COMMENT: ABNORMAL
MONOCYTES # BLD AUTO: NORMAL K/UL
MONOCYTES NFR BLD: 8 %
NEUTROPHILS NFR BLD: 62 %
NITRITE UR QL STRIP: NEGATIVE
NITRITE UR QL STRIP: NEGATIVE
PH UR STRIP: 6 [PH] (ref 5–8)
PH UR STRIP: 6 [PH] (ref 5–8)
PLATELET # BLD AUTO: 193 K/UL
PMV BLD AUTO: 11 FL
POTASSIUM SERPL-SCNC: 3.2 MMOL/L
PROT SERPL-MCNC: 6.7 G/DL
PROT UR QL STRIP: ABNORMAL
PROT UR QL STRIP: ABNORMAL
RBC # BLD AUTO: 4.45 M/UL
RBC #/AREA URNS HPF: 15 /HPF (ref 0–4)
SODIUM SERPL-SCNC: 142 MMOL/L
SP GR UR STRIP: 1.02 (ref 1–1.03)
SP GR UR STRIP: 1.02 (ref 1–1.03)
URN SPEC COLLECT METH UR: ABNORMAL
URN SPEC COLLECT METH UR: ABNORMAL
UROBILINOGEN UR STRIP-ACNC: NEGATIVE EU/DL
UROBILINOGEN UR STRIP-ACNC: NEGATIVE EU/DL
WBC # BLD AUTO: 8.28 K/UL
WBC #/AREA URNS HPF: 2 /HPF (ref 0–5)

## 2017-08-03 PROCEDURE — 25000003 PHARM REV CODE 250: Performed by: EMERGENCY MEDICINE

## 2017-08-03 PROCEDURE — 80053 COMPREHEN METABOLIC PANEL: CPT

## 2017-08-03 PROCEDURE — 96361 HYDRATE IV INFUSION ADD-ON: CPT

## 2017-08-03 PROCEDURE — 85007 BL SMEAR W/DIFF WBC COUNT: CPT

## 2017-08-03 PROCEDURE — 85027 COMPLETE CBC AUTOMATED: CPT

## 2017-08-03 PROCEDURE — 96375 TX/PRO/DX INJ NEW DRUG ADDON: CPT

## 2017-08-03 PROCEDURE — 36415 COLL VENOUS BLD VENIPUNCTURE: CPT

## 2017-08-03 PROCEDURE — 96374 THER/PROPH/DIAG INJ IV PUSH: CPT

## 2017-08-03 PROCEDURE — 63600175 PHARM REV CODE 636 W HCPCS: Performed by: EMERGENCY MEDICINE

## 2017-08-03 PROCEDURE — 99284 EMERGENCY DEPT VISIT MOD MDM: CPT | Mod: 25

## 2017-08-03 PROCEDURE — 81000 URINALYSIS NONAUTO W/SCOPE: CPT

## 2017-08-03 PROCEDURE — 81025 URINE PREGNANCY TEST: CPT

## 2017-08-03 PROCEDURE — 83690 ASSAY OF LIPASE: CPT

## 2017-08-03 RX ORDER — POTASSIUM CHLORIDE 20 MEQ/1
40 TABLET, EXTENDED RELEASE ORAL
Status: COMPLETED | OUTPATIENT
Start: 2017-08-03 | End: 2017-08-03

## 2017-08-03 RX ORDER — ONDANSETRON 2 MG/ML
4 INJECTION INTRAMUSCULAR; INTRAVENOUS
Status: COMPLETED | OUTPATIENT
Start: 2017-08-03 | End: 2017-08-03

## 2017-08-03 RX ORDER — MORPHINE SULFATE 4 MG/ML
4 INJECTION, SOLUTION INTRAMUSCULAR; INTRAVENOUS
Status: DISCONTINUED | OUTPATIENT
Start: 2017-08-03 | End: 2017-08-03

## 2017-08-03 RX ORDER — MORPHINE SULFATE 4 MG/ML
4 INJECTION, SOLUTION INTRAMUSCULAR; INTRAVENOUS
Status: COMPLETED | OUTPATIENT
Start: 2017-08-03 | End: 2017-08-03

## 2017-08-03 RX ORDER — PHENAZOPYRIDINE HYDROCHLORIDE 200 MG/1
200 TABLET, FILM COATED ORAL
Status: COMPLETED | OUTPATIENT
Start: 2017-08-03 | End: 2017-08-03

## 2017-08-03 RX ORDER — ONDANSETRON 2 MG/ML
4 INJECTION INTRAMUSCULAR; INTRAVENOUS
Status: DISCONTINUED | OUTPATIENT
Start: 2017-08-03 | End: 2017-08-03

## 2017-08-03 RX ORDER — PHENAZOPYRIDINE HYDROCHLORIDE 200 MG/1
200 TABLET, FILM COATED ORAL 3 TIMES DAILY
Qty: 6 TABLET | Refills: 0 | Status: SHIPPED | OUTPATIENT
Start: 2017-08-03 | End: 2017-08-13

## 2017-08-03 RX ADMIN — ONDANSETRON 4 MG: 2 INJECTION INTRAMUSCULAR; INTRAVENOUS at 07:08

## 2017-08-03 RX ADMIN — MORPHINE SULFATE 4 MG: 4 INJECTION INTRAVENOUS at 07:08

## 2017-08-03 RX ADMIN — SODIUM CHLORIDE 1000 ML: 0.9 INJECTION, SOLUTION INTRAVENOUS at 08:08

## 2017-08-03 RX ADMIN — SODIUM CHLORIDE 1000 ML: 0.9 INJECTION, SOLUTION INTRAVENOUS at 07:08

## 2017-08-03 RX ADMIN — ONDANSETRON 4 MG: 2 INJECTION INTRAMUSCULAR; INTRAVENOUS at 09:08

## 2017-08-03 RX ADMIN — PHENAZOPYRIDINE HYDROCHLORIDE 200 MG: 200 TABLET ORAL at 09:08

## 2017-08-03 RX ADMIN — POTASSIUM CHLORIDE 40 MEQ: 1500 TABLET, EXTENDED RELEASE ORAL at 08:08

## 2017-08-03 NOTE — TELEPHONE ENCOUNTER
Spoke to patient at length. She states that she is unable to tolerate liquids since yesterday. Urine is dark and minimal amount of urine in drainage bag. I advised that she go to ER for evaluation since she is not scheduled to see Dr. Jones until tomorrow for lanza removal.

## 2017-08-03 NOTE — TELEPHONE ENCOUNTER
----- Message from Callie Goddard sent at 8/3/2017 11:09 AM CDT -----  Contact: self  Cortez Rollins  MRN: [id number]  : 1990  PCP: Emilia Davis  Home Phone      [phone]  Work Phone      Not on file.  [employer]          [phone]      MESSAGE: on different antibotic and others meds when take them she    Can not keep water down------[phone]

## 2017-08-03 NOTE — ED TRIAGE NOTES
C/o bladder spasms and vomiting, pt has an indwelling lanza catheter and currently on antibiotics for UTI

## 2017-08-04 ENCOUNTER — OFFICE VISIT (OUTPATIENT)
Dept: INTERNAL MEDICINE | Facility: CLINIC | Age: 27
End: 2017-08-04
Payer: MEDICAID

## 2017-08-04 VITALS
SYSTOLIC BLOOD PRESSURE: 84 MMHG | RESPIRATION RATE: 16 BRPM | DIASTOLIC BLOOD PRESSURE: 52 MMHG | WEIGHT: 214.31 LBS | HEART RATE: 82 BPM | HEIGHT: 62 IN | OXYGEN SATURATION: 98 % | BODY MASS INDEX: 39.44 KG/M2

## 2017-08-04 DIAGNOSIS — K62.89 PROCTITIS: ICD-10-CM

## 2017-08-04 DIAGNOSIS — N39.0 ENTEROCOCCUS UTI: ICD-10-CM

## 2017-08-04 DIAGNOSIS — N39.0 URINARY TRACT INFECTION, SITE NOT SPECIFIED: Primary | ICD-10-CM

## 2017-08-04 DIAGNOSIS — B95.2 ENTEROCOCCUS UTI: ICD-10-CM

## 2017-08-04 PROCEDURE — 99213 OFFICE O/P EST LOW 20 MIN: CPT | Mod: S$PBB,,, | Performed by: INTERNAL MEDICINE

## 2017-08-04 PROCEDURE — 99213 OFFICE O/P EST LOW 20 MIN: CPT | Mod: PBBFAC | Performed by: INTERNAL MEDICINE

## 2017-08-04 PROCEDURE — 99999 PR PBB SHADOW E&M-EST. PATIENT-LVL III: CPT | Mod: PBBFAC,,, | Performed by: INTERNAL MEDICINE

## 2017-08-04 PROCEDURE — 3008F BODY MASS INDEX DOCD: CPT | Mod: ,,, | Performed by: INTERNAL MEDICINE

## 2017-08-04 NOTE — PHYSICIAN QUERY
PT Name: Cortez Rollins  MR #: [id number]    Physician Query Form - Cause and Effect Relationship Clarification      CDS/: Columba Robb               Contact information:[email or web address]    This form is a permanent document in the medical record.     Query Date: August 4, 2017    By submitting this query, we are merely seeking further clarification of documentation. Please utilize your independent clinical judgment when addressing the question(s) below.    The Medical record contains the following:  Supporting Clinical Findings   Location in record       This is a 26-year-old female who presents with suprapubic pain.  She was seen here on July 27, 2017 and diagnosed with proctitis.  She has since been having urinary retention requiring a catheter placement.  She presented today with a Gilmore in place.  She states today she started having suprapubic pain.  She also has left flank pain.  She has been taking ciprofloxacin for the past 2 days since she was told by her PCP that the CAT scan she had here showed a UTI.  She was also told that she had a kidney stone.  I reviewed these records and kidney stone was 5 mm but in the superior pole of the left kidney.  There was no evidence of hydronephrosis.  She has also been taking Flagyl since she was diagnosed with proctitis here on the 27th.  Her symptoms have been improving except for the suprapubic pain which she started having today.  She denies any fever, chills, nausea or vomiting.  The pain is constant and it shows no moderating factors.  There is no history of hematuria.                                                                                                                                                                                         ED Summary     The encounter diagnosis was Urinary tract infection without hematuria, site unspecified                                                                                                                                                                                         ED Summary         Provider, please clarify if there is any correlation between lanza catheter and UTI.           Are the conditions:     [  ] Due to or associated with each other     [  ] Unrelated to each other     [  ] Other (Please Specify): _________________________     [  ] Clinically Undetermined

## 2017-08-04 NOTE — ED PROVIDER NOTES
Encounter Date: 8/3/2017       History     Chief Complaint   Patient presents with    Urinary Tract Infection     This is a 26-year-old female who presents with suprapubic pain.  She was seen here on July 27, 2017 and diagnosed with proctitis.  She has since been having urinary retention requiring a catheter placement.  She presented today with a Gilmore in place.  She states today she started having suprapubic pain.  She also has left flank pain.  She has been taking ciprofloxacin for the past 2 days since she was told by her PCP that the CAT scan she had here showed a UTI.  She was also told that she had a kidney stone.  I reviewed these records and kidney stone was 5 mm but in the superior pole of the left kidney.  There was no evidence of hydronephrosis.  She has also been taking Flagyl since she was diagnosed with proctitis here on the 27th.  Her symptoms have been improving except for the suprapubic pain which she started having today.  She denies any fever, chills, nausea or vomiting.  The pain is constant and it shows no moderating factors.  There is no history of hematuria.          Review of patient's allergies indicates:  No Known Allergies  Past Medical History:   Diagnosis Date    Allergy     GERD (gastroesophageal reflux disease)     Thyroid dysfunction      Past Surgical History:   Procedure Laterality Date    ADENOIDECTOMY      CHOLECYSTECTOMY      TONSILLECTOMY       Family History   Problem Relation Age of Onset    Cancer Mother      ovarian cancer    Hypertension Father     Diabetes Father      Social History   Substance Use Topics    Smoking status: Current Every Day Smoker     Packs/day: 0.50    Smokeless tobacco: Not on file    Alcohol use No     Review of Systems   All other systems reviewed and are negative.      Physical Exam     Initial Vitals [08/03/17 1814]   BP Pulse Resp Temp SpO2   117/72 91 18 96.7 °F (35.9 °C) --      MAP       87         Physical Exam    Nursing note and  vitals reviewed.  Constitutional: She appears well-developed and well-nourished. She is not diaphoretic. No distress.   Well-appearing and nontoxic   HENT:   Head: Normocephalic and atraumatic.   Nose: Nose normal.   Mouth/Throat: Oropharynx is clear and moist. No oropharyngeal exudate.   Eyes: Conjunctivae and EOM are normal. Pupils are equal, round, and reactive to light. Right eye exhibits no discharge. Left eye exhibits no discharge.   Neck: Normal range of motion. Neck supple. No JVD present.   Cardiovascular: Normal rate, regular rhythm, normal heart sounds and intact distal pulses.   Pulmonary/Chest: Breath sounds normal. No stridor. No respiratory distress. She has no wheezes. She has no rhonchi. She has no rales.   Abdominal: Soft. Bowel sounds are normal. She exhibits no distension. There is no tenderness. There is no rebound and no guarding.   Genitourinary:   Genitourinary Comments: Leg bag in place with yellow urine   Neurological: She is alert and oriented to person, place, and time.   Skin: Skin is warm and dry.   Psychiatric: She has a normal mood and affect. Her behavior is normal. Judgment and thought content normal.         ED Course   Procedures  Labs Reviewed   URINALYSIS - Abnormal; Notable for the following:        Result Value    Protein, UA 1+ (*)     Ketones, UA 2+ (*)     Bilirubin (UA) 1+ (*)     Occult Blood UA 1+ (*)     All other components within normal limits    Narrative:     Clean catch   COMPREHENSIVE METABOLIC PANEL - Abnormal; Notable for the following:     Potassium 3.2 (*)     Albumin 3.4 (*)     Alkaline Phosphatase 45 (*)     All other components within normal limits   URINALYSIS - Abnormal; Notable for the following:     Protein, UA 1+ (*)     Ketones, UA 2+ (*)     Bilirubin (UA) 1+ (*)     Occult Blood UA 1+ (*)     All other components within normal limits    Narrative:     Clean catch   URINALYSIS MICROSCOPIC - Abnormal; Notable for the following:     RBC, UA 15 (*)      All other components within normal limits    Narrative:     Clean catch   PREGNANCY TEST, URINE RAPID   CBC W/ AUTO DIFFERENTIAL   LIPASE               Well-appearing 26-year-old female with suprapubic pain.  Urine culture from previous visit has grown enterococcus faecalis.  Greater than 100,000 colonies.  She was just started on ciprofloxacin which the MicroBid is sensitive to.  I think taken out her catheter.  She has been unable to urinate since but does not have a full bladder.  She prefers to go home and return if she is unable to urinate at home.  There is no evidence of renal insufficiency.                 ED Course     Clinical Impression:   The encounter diagnosis was Urinary tract infection without hematuria, site unspecified.                           Jacob Haley MD  08/03/17 2167

## 2017-08-04 NOTE — PROGRESS NOTES
Subjective:       Patient ID: Cortez Rollins is a 26 y.o. female.    Chief Complaint: Urinary Tract Infection (er follow up) and Urinary Retention (catheer is out )    Cortez Rollins is a 26 y.o. Female who see SOLO Nieto     Here for follow up visit for ER  For UTI.  Her urine culture grew enterococcus ;  She is at present on cipro.  She had a catheter placed for 5 days.  Removed yesterday .  7/27/ 17   ED visit : anal sex ;rectal pain     7/29/17 : proctitis.  8/3/17  UTI     Her culture; CT scan reviewed.        Urinary Tract Infection    Pertinent negatives include no hematuria, vomiting or constipation.     Review of Systems   Constitutional: Positive for activity change. Negative for unexpected weight change.   HENT: Positive for hearing loss. Negative for rhinorrhea and trouble swallowing.    Eyes: Negative for discharge and visual disturbance.   Respiratory: Negative for chest tightness and wheezing.    Cardiovascular: Negative for chest pain and palpitations.   Gastrointestinal: Negative for constipation and vomiting.        Rectal pain    Endocrine: Positive for polydipsia and polyuria.   Genitourinary: Negative for difficulty urinating, dysuria, hematuria and menstrual problem.   Musculoskeletal: Negative for arthralgias, joint swelling and neck pain.   Neurological: Positive for headaches. Negative for weakness.   Psychiatric/Behavioral: Positive for dysphoric mood. Negative for confusion.       Objective:      Physical Exam   Constitutional: She is oriented to person, place, and time. She appears well-developed and well-nourished.   HENT:   Head: Normocephalic and atraumatic.   Cardiovascular: Normal rate, regular rhythm and normal heart sounds.    Pulmonary/Chest: Effort normal and breath sounds normal.   Abdominal: Soft. Bowel sounds are normal. There is no tenderness.   Musculoskeletal: She exhibits no edema.   Neurological: She is alert and oriented to person, place, and time.   Skin: Skin is warm and dry.    Psychiatric: She has a normal mood and affect. Her behavior is normal. Judgment and thought content normal.   Nursing note and vitals reviewed.      Assessment:       1. Urinary tract infection, site not specified    2. Enterococcus UTI    3. Proctitis        Plan:   Cortez was seen today for urinary tract infection and urinary retention.    Diagnoses and all orders for this visit:    Urinary tract infection, site not specified  -     POCT urinalysis, dipstick or tablet reag  -     Urinalysis; Future  Finish Cipro and check UA in 2 weeks. And see lisinsey after UA    Enterococcus UTI  -     POCT urinalysis, dipstick or tablet reag  -     Urinalysis; Future  Finish cipro.    Proctitis  Continue flagyl  she could not urinate in office for urine dip stick.

## 2017-08-05 ENCOUNTER — PATIENT MESSAGE (OUTPATIENT)
Dept: INTERNAL MEDICINE | Facility: CLINIC | Age: 27
End: 2017-08-05

## 2017-08-05 LAB — BACTERIA BLD CULT: NORMAL

## 2017-08-07 ENCOUNTER — LAB VISIT (OUTPATIENT)
Dept: LAB | Facility: HOSPITAL | Age: 27
End: 2017-08-07
Attending: INTERNAL MEDICINE
Payer: MEDICAID

## 2017-08-07 DIAGNOSIS — N39.0 URINARY TRACT INFECTION, SITE NOT SPECIFIED: ICD-10-CM

## 2017-08-07 DIAGNOSIS — N39.0 URINARY TRACT INFECTION WITH HEMATURIA, SITE UNSPECIFIED: ICD-10-CM

## 2017-08-07 DIAGNOSIS — B95.2 ENTEROCOCCUS UTI: ICD-10-CM

## 2017-08-07 DIAGNOSIS — N39.0 ENTEROCOCCUS UTI: ICD-10-CM

## 2017-08-07 DIAGNOSIS — R31.9 URINARY TRACT INFECTION WITH HEMATURIA, SITE UNSPECIFIED: ICD-10-CM

## 2017-08-07 LAB
BACTERIA #/AREA URNS HPF: ABNORMAL /HPF
BILIRUB UR QL STRIP: ABNORMAL
CLARITY UR: ABNORMAL
COLOR UR: ABNORMAL
GLUCOSE UR QL STRIP: NEGATIVE
HGB UR QL STRIP: ABNORMAL
HYALINE CASTS #/AREA URNS LPF: 0 /LPF
KETONES UR QL STRIP: ABNORMAL
LEUKOCYTE ESTERASE UR QL STRIP: NEGATIVE
MICROSCOPIC COMMENT: ABNORMAL
NITRITE UR QL STRIP: POSITIVE
PH UR STRIP: 6 [PH] (ref 5–8)
PROT UR QL STRIP: ABNORMAL
RBC #/AREA URNS HPF: >100 /HPF (ref 0–4)
SP GR UR STRIP: 1.02 (ref 1–1.03)
SQUAMOUS #/AREA URNS HPF: 25 /HPF
URN SPEC COLLECT METH UR: ABNORMAL
UROBILINOGEN UR STRIP-ACNC: 1 EU/DL
WBC #/AREA URNS HPF: 5 /HPF (ref 0–5)

## 2017-08-07 PROCEDURE — 87086 URINE CULTURE/COLONY COUNT: CPT

## 2017-08-07 PROCEDURE — 81000 URINALYSIS NONAUTO W/SCOPE: CPT

## 2017-08-08 LAB
BACTERIA UR CULT: NORMAL
BACTERIA UR CULT: NORMAL

## 2017-08-10 ENCOUNTER — TELEPHONE (OUTPATIENT)
Dept: INTERNAL MEDICINE | Facility: CLINIC | Age: 27
End: 2017-08-10

## 2017-08-10 RX ORDER — FLUCONAZOLE 150 MG/1
150 TABLET ORAL DAILY
Qty: 1 TABLET | Refills: 0 | Status: SHIPPED | OUTPATIENT
Start: 2017-08-10 | End: 2017-08-11

## 2017-08-10 NOTE — TELEPHONE ENCOUNTER
The urine culture grew back multiple organisms. She may have yeast from the all the abx. I will send diflucan

## 2017-08-10 NOTE — TELEPHONE ENCOUNTER
----- Message from Callie Goddard sent at 8/10/2017  7:31 AM CDT -----  Contact: self  Cortez Rollins  MRN: [id number]  : 1990  PCP: Emilia Davis  Home Phone      [phone]  Work Phone      Not on file.  [place]          [phone]      MESSAGE: test results-------[id number]

## 2017-08-10 NOTE — TELEPHONE ENCOUNTER
Pt requesting UA and Urine Culture results. Pt is currently taking PYRIDIUM 200mg and  CIPRO 500mg tablets. Pt C/O burning and vaginal itching. Would like to know if current medications will relieve these symptoms. Please advise.

## 2017-08-17 ENCOUNTER — LAB VISIT (OUTPATIENT)
Dept: LAB | Facility: HOSPITAL | Age: 27
End: 2017-08-17
Attending: NURSE PRACTITIONER
Payer: MEDICAID

## 2017-08-17 ENCOUNTER — OFFICE VISIT (OUTPATIENT)
Dept: INTERNAL MEDICINE | Facility: CLINIC | Age: 27
End: 2017-08-17
Payer: MEDICAID

## 2017-08-17 ENCOUNTER — OFFICE VISIT (OUTPATIENT)
Dept: SURGERY | Facility: CLINIC | Age: 27
End: 2017-08-17
Payer: MEDICAID

## 2017-08-17 VITALS
WEIGHT: 212.31 LBS | HEIGHT: 62 IN | DIASTOLIC BLOOD PRESSURE: 64 MMHG | BODY MASS INDEX: 39.07 KG/M2 | SYSTOLIC BLOOD PRESSURE: 108 MMHG | RESPIRATION RATE: 16 BRPM | HEART RATE: 84 BPM

## 2017-08-17 VITALS
HEART RATE: 74 BPM | WEIGHT: 213.88 LBS | SYSTOLIC BLOOD PRESSURE: 108 MMHG | HEIGHT: 62 IN | DIASTOLIC BLOOD PRESSURE: 70 MMHG | RESPIRATION RATE: 16 BRPM | BODY MASS INDEX: 39.36 KG/M2

## 2017-08-17 DIAGNOSIS — F32.A ANXIETY AND DEPRESSION: ICD-10-CM

## 2017-08-17 DIAGNOSIS — K60.2 ANAL FISSURE: Primary | ICD-10-CM

## 2017-08-17 DIAGNOSIS — N39.0 E. COLI UTI: Primary | ICD-10-CM

## 2017-08-17 DIAGNOSIS — B96.20 E. COLI UTI: Primary | ICD-10-CM

## 2017-08-17 DIAGNOSIS — F41.9 ANXIETY AND DEPRESSION: ICD-10-CM

## 2017-08-17 DIAGNOSIS — N39.0 E. COLI UTI: ICD-10-CM

## 2017-08-17 DIAGNOSIS — K62.89 PROCTITIS: ICD-10-CM

## 2017-08-17 DIAGNOSIS — B96.20 E. COLI UTI: ICD-10-CM

## 2017-08-17 PROCEDURE — 3008F BODY MASS INDEX DOCD: CPT | Mod: ,,, | Performed by: COLON & RECTAL SURGERY

## 2017-08-17 PROCEDURE — 3008F BODY MASS INDEX DOCD: CPT | Mod: ,,, | Performed by: NURSE PRACTITIONER

## 2017-08-17 PROCEDURE — 99213 OFFICE O/P EST LOW 20 MIN: CPT | Mod: S$PBB,,, | Performed by: NURSE PRACTITIONER

## 2017-08-17 PROCEDURE — 99999 PR PBB SHADOW E&M-EST. PATIENT-LVL III: CPT | Mod: PBBFAC,,, | Performed by: NURSE PRACTITIONER

## 2017-08-17 PROCEDURE — 99203 OFFICE O/P NEW LOW 30 MIN: CPT | Mod: S$PBB,,, | Performed by: COLON & RECTAL SURGERY

## 2017-08-17 PROCEDURE — 99213 OFFICE O/P EST LOW 20 MIN: CPT | Mod: PBBFAC,27 | Performed by: COLON & RECTAL SURGERY

## 2017-08-17 PROCEDURE — 99999 PR PBB SHADOW E&M-EST. PATIENT-LVL III: CPT | Mod: PBBFAC,,, | Performed by: COLON & RECTAL SURGERY

## 2017-08-17 RX ORDER — CITALOPRAM 20 MG/1
20 TABLET, FILM COATED ORAL DAILY
Qty: 30 TABLET | Refills: 1 | Status: SHIPPED | OUTPATIENT
Start: 2017-08-17 | End: 2017-09-11 | Stop reason: SDUPTHER

## 2017-08-17 NOTE — LETTER
August 17, 2017      Emilia Davis, SOLO  [address] [address]           [place]  [address] [facility] [address]  Phone: [phone]  Fax: [phone]          Patient: Cortez Rollins   MR Number: [id number]   YOB: 1990   Date of Visit: 8/17/2017       Dear Emilia Davis:    Thank you for referring Cortez Rollins to me for evaluation. Attached you will find relevant portions of my assessment and plan of care.    If you have questions, please do not hesitate to call me. I look forward to following Cortez Rollins along with you.    Sincerely,    Paulino Ronquillo MD    Enclosure  CC:  No Recipients    If you would like to receive this communication electronically, please contact [email or web address] or [phone] to request more information on [employer] Link access.    For providers and/or their staff who would like to refer a patient to [facility], please contact us through our one-stop-shop provider referral line, [facility], at [phone].    If you feel you have received this communication in error or would no longer like to receive these types of communications, please e-mail [email or web address]

## 2017-08-17 NOTE — PROGRESS NOTES
Subjective:       Patient ID: Cortez Rollins is a 26 y.o. female.    Chief Complaint: Anal Fissure and Rectal Pain    HPI 27 yo F referred for evaluation of anal fissure  - seen in ED 7/27/17 with severe anal pain after having anal intercourse 2 days prior - diagnosed with fissure - returned to ED 2 days later with urinary retention and Gilmore placed, treated for UTI.  Since then, she's been on stool softeners and has increased her fiber intake- symptoms have improved significantly.  She now just has pain with initiation of BM's & with sitting.  +Intermittent bleeding with BM's.    Last colonoscopy - never  No family hx of CRC or IBD.      Review of patient's allergies indicates:  No Known Allergies    Past Medical History:   Diagnosis Date    Allergy     GERD (gastroesophageal reflux disease)     Thyroid dysfunction        Past Surgical History:   Procedure Laterality Date    ADENOIDECTOMY      CHOLECYSTECTOMY      TONSILLECTOMY         Current Outpatient Prescriptions   Medication Sig Dispense Refill    citalopram (CELEXA) 20 MG tablet Take 1 tablet (20 mg total) by mouth once daily. 30 tablet 1    ibuprofen (ADVIL,MOTRIN) 600 MG tablet Take 600 mg by mouth 3 (three) times daily.      montelukast (SINGULAIR) 10 mg tablet Take 10 mg by mouth every evening.      omeprazole (PRILOSEC) 20 MG capsule Take 20 mg by mouth once daily.      DILTIAZEM HCL (DILTIAZEM 2% CREAM) Apply topically 3 (three) times daily. Apply topically to anal area. 50 g 5     No current facility-administered medications for this visit.        Family History   Problem Relation Age of Onset    Cancer Mother      ovarian cancer    Hypertension Father     Diabetes Father        Social History     Social History    Marital status: Single     Spouse name: N/A    Number of children: N/A    Years of education: N/A     Social History Main Topics    Smoking status: Current Every Day Smoker     Packs/day: 0.50    Smokeless tobacco: Never Used     Alcohol use No    Drug use: No    Sexual activity: Not Asked     Other Topics Concern    None     Social History Narrative    None       Review of Systems   Constitutional: Negative for chills and fever.   HENT: Negative for congestion and sore throat.    Eyes: Negative for visual disturbance.   Respiratory: Negative for cough and shortness of breath.    Cardiovascular: Negative for chest pain and palpitations.   Gastrointestinal: Positive for anal bleeding and rectal pain. Negative for abdominal distention, abdominal pain, blood in stool, constipation, diarrhea, nausea and vomiting.   Endocrine: Negative for cold intolerance and heat intolerance.   Genitourinary: Negative for dysuria and frequency.   Musculoskeletal: Negative for arthralgias, back pain and neck pain.   Skin: Negative for rash.   Allergic/Immunologic: Negative for immunocompromised state.   Neurological: Negative for dizziness, light-headedness and headaches.   Hematological: Does not bruise/bleed easily.   Psychiatric/Behavioral: Negative for confusion. The patient is not nervous/anxious.        Objective:      Physical Exam   Constitutional: She is oriented to person, place, and time. She appears well-developed and well-nourished.   HENT:   Head: Normocephalic.   Pulmonary/Chest: Effort normal. No respiratory distress.   Abdominal: Soft. Bowel sounds are normal. She exhibits no distension and no mass. There is no tenderness. There is no rebound and no guarding.   Genitourinary:   Genitourinary Comments: Perineum - normal perianal skin, no mass, deep anterior midline fissure, small external hemorrhoids - non-thrombosed, non-inflamed  XI/Anoscopy deferred       Musculoskeletal: Normal range of motion.   Neurological: She is alert and oriented to person, place, and time.   Skin: Skin is warm and dry.   Psychiatric: She has a normal mood and affect.         Lab Results   Component Value Date    WBC 8.28 08/03/2017    HGB 13.3 08/03/2017     HCT 40.2 08/03/2017    MCV 90 08/03/2017     08/03/2017     BMP  Lab Results   Component Value Date     08/03/2017    K 3.2 (L) 08/03/2017     08/03/2017    CO2 24 08/03/2017    BUN 11 08/03/2017    CREATININE 0.7 08/03/2017    CALCIUM 9.0 08/03/2017    ANIONGAP 12 08/03/2017    ESTGFRAFRICA >60 08/03/2017    EGFRNONAA >60 08/03/2017     CMP  Sodium   Date Value Ref Range Status   08/03/2017 142 136 - 145 mmol/L Final     Potassium   Date Value Ref Range Status   08/03/2017 3.2 (L) 3.5 - 5.1 mmol/L Final     Chloride   Date Value Ref Range Status   08/03/2017 106 95 - 110 mmol/L Final     CO2   Date Value Ref Range Status   08/03/2017 24 23 - 29 mmol/L Final     Glucose   Date Value Ref Range Status   08/03/2017 101 70 - 110 mg/dL Final     BUN, Bld   Date Value Ref Range Status   08/03/2017 11 6 - 20 mg/dL Final     Creatinine   Date Value Ref Range Status   08/03/2017 0.7 0.5 - 1.4 mg/dL Final     Calcium   Date Value Ref Range Status   08/03/2017 9.0 8.7 - 10.5 mg/dL Final     Total Protein   Date Value Ref Range Status   08/03/2017 6.7 6.0 - 8.4 g/dL Final     Albumin   Date Value Ref Range Status   08/03/2017 3.4 (L) 3.5 - 5.2 g/dL Final     Total Bilirubin   Date Value Ref Range Status   08/03/2017 0.4 0.1 - 1.0 mg/dL Final     Comment:     For infants and newborns, interpretation of results should be based  on gestational age, weight and in agreement with clinical  observations.  Premature Infant recommended reference ranges:  Up to 24 hours.............<8.0 mg/dL  Up to 48 hours............<12.0 mg/dL  3-5 days..................<15.0 mg/dL  6-29 days.................<15.0 mg/dL       Alkaline Phosphatase   Date Value Ref Range Status   08/03/2017 45 (L) 55 - 135 U/L Final     AST   Date Value Ref Range Status   08/03/2017 28 10 - 40 U/L Final     ALT   Date Value Ref Range Status   08/03/2017 25 10 - 44 U/L Final     Anion Gap   Date Value Ref Range Status   08/03/2017 12 8 - 16 mmol/L  Final     eGFR if    Date Value Ref Range Status   08/03/2017 >60 >60 mL/min/1.73 m^2 Final     eGFR if non    Date Value Ref Range Status   08/03/2017 >60 >60 mL/min/1.73 m^2 Final     Comment:     Calculation used to obtain the estimated glomerular filtration  rate (eGFR) is the CKD-EPI equation. Since race is unknown   in our information system, the eGFR values for   -American and Non--American patients are given   for each creatinine result.       No results found for: CEA    Assessment:       1. Anal fissure        Plan:   Increased fiber intake (20-25 grams/day) and fluid intake (8-10 glasses water/day)  Daily fiber supplement  Soaks/sitz baths  Avoid excessive trauma/straining if possible  Topical diltiazem 2% tid.  RTO 6 weeks - if no improvement, will consider LIAS.      Paulino Ronquillo MD, FACS, FASCRS

## 2017-08-17 NOTE — PROGRESS NOTES
"Subjective:       Patient ID: Cortez Rollins is a 26 y.o. female.    Chief Complaint: Follow-up ( 2 week )    HPI: Pt presents to clinic today known to me with c/o dysuria has improved intensely. She reports that it is mainly in the am when her using is mostly concentrated. She reports that she has been strictly drinking cranberry and water, no soda. She reports that she was having trouble urinating but that is getting better. She reports that she is still taking stool softener. She is having BM every marcello she uses bathroom. She reports that she has a little discomfort but that is better as well. Has appt with colorectal this afternoon. Her most recent urine culture showed mult organisms    She does report that she has a hx of depression. She lost her medical coverage so she has not angelika treated in the past few years. She cries a lot. She report that she has trouble staying alseep. Sleeps a lot during the day. She She reports that she has loss her appetite. No wt loss. No energy, no will to do her normal activities. No motivation. No concentration issues, + "extremely irritable". No thoughts of hurting self or others. Has been on lexapro but made her mad. She does report doing well on celexa.   Review of Systems   Constitutional: Positive for activity change and unexpected weight change.   HENT: Negative for hearing loss, rhinorrhea and trouble swallowing.    Eyes: Negative for discharge and visual disturbance.   Respiratory: Negative for chest tightness and wheezing.    Cardiovascular: Negative for chest pain and palpitations.   Gastrointestinal: Positive for blood in stool and diarrhea. Negative for constipation and vomiting.   Endocrine: Positive for polydipsia. Negative for polyuria.   Genitourinary: Positive for difficulty urinating, dysuria and hematuria. Negative for menstrual problem.   Musculoskeletal: Negative for arthralgias, joint swelling and neck pain.   Neurological: Positive for headaches. Negative for " weakness.   Psychiatric/Behavioral: Positive for agitation, dysphoric mood and sleep disturbance. Negative for confusion, self-injury and suicidal ideas. The patient is nervous/anxious.        Objective:      Physical Exam   Constitutional: She is oriented to person, place, and time. She appears well-developed and well-nourished.   HENT:   Head: Normocephalic and atraumatic.   Neck: No thyromegaly present.   Cardiovascular: Normal rate, regular rhythm, normal heart sounds and intact distal pulses.    Pulmonary/Chest: Effort normal and breath sounds normal.   Abdominal: Soft. Bowel sounds are normal. She exhibits no distension and no mass. There is no tenderness. There is no guarding.   Lower abd mild tenderness   Musculoskeletal: Normal range of motion.   -cva tenderness   Neurological: She is alert and oriented to person, place, and time. She has normal reflexes.   Skin: Skin is warm and dry.   Psychiatric: She has a normal mood and affect. Her behavior is normal. Judgment and thought content normal.   Nursing note and vitals reviewed.      Assessment:       1. E. coli UTI    2. Anxiety and depression    3. Proctitis        Plan:   Cortez was seen today for follow-up.    Diagnoses and all orders for this visit:    E. coli UTI  -     Urine culture; Future    Anxiety and depression  -     citalopram (CELEXA) 20 MG tablet; Take 1 tablet (20 mg total) by mouth once daily.    Proctitis    F/u with Lui today    F/u me 4 weeks  I've explained to her that drugs of the SSRI class can have side effects such as weight gain, sexual dysfunction, insomnia, headache, nausea. These medications are generally effective at alleviating symptoms of anxiety and/or depression. Let me know if significant side effects do occur.

## 2017-08-19 LAB — BACTERIA UR CULT: NO GROWTH

## 2017-09-10 ENCOUNTER — PATIENT MESSAGE (OUTPATIENT)
Dept: INTERNAL MEDICINE | Facility: CLINIC | Age: 27
End: 2017-09-10

## 2017-09-10 DIAGNOSIS — F41.9 ANXIETY AND DEPRESSION: ICD-10-CM

## 2017-09-10 DIAGNOSIS — F32.A ANXIETY AND DEPRESSION: ICD-10-CM

## 2017-09-11 RX ORDER — CITALOPRAM 40 MG/1
40 TABLET, FILM COATED ORAL DAILY
Qty: 30 TABLET | Refills: 1 | Status: SHIPPED | OUTPATIENT
Start: 2017-09-11 | End: 2017-11-08 | Stop reason: SDUPTHER

## 2017-09-20 RX ORDER — OMEPRAZOLE 20 MG/1
20 CAPSULE, DELAYED RELEASE ORAL DAILY
Qty: 30 CAPSULE | Refills: 1 | Status: SHIPPED | OUTPATIENT
Start: 2017-09-20 | End: 2018-08-21

## 2017-09-20 NOTE — TELEPHONE ENCOUNTER
----- Message from Elissa Gamboa sent at 2017  3:41 PM CDT -----  Contact: Parminder  Cortez Rollins  MRN: [id number]  : 1990  PCP: Emilia Davis  Home Phone      [phone]  Work Phone      Not on file.  Mobile          [phone]      MESSAGE:  Needs refill on Omeprazole called in. Please advise.    PHONE:  [phone]

## 2017-09-28 ENCOUNTER — PATIENT MESSAGE (OUTPATIENT)
Dept: INTERNAL MEDICINE | Facility: CLINIC | Age: 27
End: 2017-09-28

## 2017-10-18 ENCOUNTER — OFFICE VISIT (OUTPATIENT)
Dept: INTERNAL MEDICINE | Facility: CLINIC | Age: 27
End: 2017-10-18
Payer: MEDICAID

## 2017-10-18 VITALS
HEART RATE: 72 BPM | TEMPERATURE: 98 F | HEIGHT: 62 IN | RESPIRATION RATE: 18 BRPM | BODY MASS INDEX: 41.49 KG/M2 | WEIGHT: 225.5 LBS | SYSTOLIC BLOOD PRESSURE: 120 MMHG | DIASTOLIC BLOOD PRESSURE: 62 MMHG

## 2017-10-18 DIAGNOSIS — Z71.6 ENCOUNTER FOR SMOKING CESSATION COUNSELING: ICD-10-CM

## 2017-10-18 DIAGNOSIS — J06.9 UPPER RESPIRATORY TRACT INFECTION, UNSPECIFIED TYPE: ICD-10-CM

## 2017-10-18 DIAGNOSIS — L30.9 DERMATITIS: Primary | ICD-10-CM

## 2017-10-18 PROCEDURE — 99999 PR PBB SHADOW E&M-EST. PATIENT-LVL III: CPT | Mod: PBBFAC,,, | Performed by: NURSE PRACTITIONER

## 2017-10-18 PROCEDURE — 99214 OFFICE O/P EST MOD 30 MIN: CPT | Mod: S$PBB,,, | Performed by: NURSE PRACTITIONER

## 2017-10-18 PROCEDURE — 96372 THER/PROPH/DIAG INJ SC/IM: CPT | Mod: PBBFAC

## 2017-10-18 PROCEDURE — 99213 OFFICE O/P EST LOW 20 MIN: CPT | Mod: PBBFAC | Performed by: NURSE PRACTITIONER

## 2017-10-18 RX ORDER — VARENICLINE TARTRATE 0.5 (11)-1
KIT ORAL
Qty: 1 PACKAGE | Refills: 0 | Status: SHIPPED | OUTPATIENT
Start: 2017-10-18 | End: 2017-12-04

## 2017-10-18 RX ORDER — CLOTRIMAZOLE AND BETAMETHASONE DIPROPIONATE 10; .64 MG/G; MG/G
CREAM TOPICAL 2 TIMES DAILY
Qty: 45 G | Refills: 1 | Status: SHIPPED | OUTPATIENT
Start: 2017-10-18 | End: 2018-08-21

## 2017-10-18 RX ORDER — METHYLPREDNISOLONE ACETATE 80 MG/ML
80 INJECTION, SUSPENSION INTRA-ARTICULAR; INTRALESIONAL; INTRAMUSCULAR; SOFT TISSUE
Status: COMPLETED | OUTPATIENT
Start: 2017-10-18 | End: 2017-10-18

## 2017-10-18 RX ADMIN — METHYLPREDNISOLONE ACETATE 80 MG: 80 INJECTION, SUSPENSION INTRA-ARTICULAR; INTRALESIONAL; INTRAMUSCULAR; SOFT TISSUE at 08:10

## 2017-10-18 NOTE — PROGRESS NOTES
Subjective:       Patient ID: Cortez Rollins is a 26 y.o. female.    Chief Complaint: Rash; Nicotine Dependence; Sinusitis; and Cough    HPI: Pt presents to clinic today known to me with c/o rash. She reports that she started working at [employer] and noticed one to arm and one to back. Itching.     She also reports that she started with a cough and congestion a couple days ago. She reports that she has sinus pressure, coughing. She has a little bit of sore throat. Tried nyquil with minimal relief. No fever.     She also reports that she is interested in stopping smoking. She has tried gum.   Review of Systems   Constitutional: Positive for activity change and fatigue. Negative for appetite change, chills, fever and unexpected weight change.   HENT: Positive for congestion, hearing loss, postnasal drip, sinus pressure, sore throat and trouble swallowing. Negative for ear pain and rhinorrhea.    Eyes: Negative for pain, discharge, itching and visual disturbance.   Respiratory: Positive for cough and wheezing. Negative for choking, chest tightness and shortness of breath.    Cardiovascular: Negative for chest pain, palpitations and leg swelling.   Gastrointestinal: Positive for diarrhea. Negative for abdominal pain, blood in stool, constipation, nausea and vomiting.   Endocrine: Positive for polydipsia. Negative for polyuria.   Genitourinary: Negative for difficulty urinating, dysuria, hematuria and menstrual problem.   Musculoskeletal: Negative for arthralgias, joint swelling, myalgias, neck pain and neck stiffness.   Skin: Positive for rash. Negative for wound.   Neurological: Positive for weakness and headaches. Negative for light-headedness.   Psychiatric/Behavioral: Negative for confusion and dysphoric mood.       Objective:      Physical Exam   Constitutional: She is oriented to person, place, and time. She appears well-developed and well-nourished.   HENT:   Head: Normocephalic and atraumatic.   Right Ear: External  ear normal.   Left Ear: External ear normal.   Mouth/Throat: Posterior oropharyngeal erythema present. No oropharyngeal exudate.   Bilateral with fluid   Eyes: Pupils are equal, round, and reactive to light.   Neck: Normal range of motion. Neck supple. No thyromegaly present.   Cardiovascular: Normal rate, regular rhythm, normal heart sounds and intact distal pulses.    No murmur heard.  Pulmonary/Chest: Effort normal and breath sounds normal. No respiratory distress. She has no wheezes. She has no rales.   Abdominal: Soft. Bowel sounds are normal. She exhibits no distension and no mass. There is no tenderness. There is no rebound and no guarding.   Musculoskeletal: Normal range of motion.   Lymphadenopathy:     She has no cervical adenopathy.   Neurological: She is alert and oriented to person, place, and time. She has normal reflexes.   Skin: Skin is warm and dry. Rash noted.   Small round rash to left wrist and right shoulder blade, dried center.   Psychiatric: She has a normal mood and affect.   Nursing note and vitals reviewed.      Assessment:       1. Dermatitis    2. Encounter for smoking cessation counseling    3. Upper respiratory tract infection, unspecified type        Plan:   Cortez was seen today for rash, nicotine dependence, sinusitis and cough.    Diagnoses and all orders for this visit:    Dermatitis  -     clotrimazole-betamethasone 1-0.05% (LOTRISONE) cream; Apply topically 2 (two) times daily.    Encounter for smoking cessation counseling  -     varenicline (CHANTIX STARTING MONTH ARNOLDO) 0.5 mg (11)- 1 mg (42) tablet; Take one 0.5mg tab by mouth once daily X3 days,then increase to one 0.5mg tab twice daily X4 days,then increase to one 1mg tab twice daily    Upper respiratory tract infection, unspecified type  -     methylPREDNISolone acetate injection 80 mg; Inject 1 mL (80 mg total) into the muscle one time.    Discussed chantix, how it works as well as side effects., She has had some depression  rajendra has been well controled with celexa. Not now or ever has she had thoughts of hurting self or others. Will take pm does 4 hr before bed. She will f/u in 4 weeks for check up on smoking cessation. Call office or f/u sooner if needed

## 2017-11-08 ENCOUNTER — PATIENT MESSAGE (OUTPATIENT)
Dept: INTERNAL MEDICINE | Facility: CLINIC | Age: 27
End: 2017-11-08

## 2017-11-08 DIAGNOSIS — F32.A ANXIETY AND DEPRESSION: ICD-10-CM

## 2017-11-08 DIAGNOSIS — F41.9 ANXIETY AND DEPRESSION: ICD-10-CM

## 2017-11-08 RX ORDER — CITALOPRAM 40 MG/1
TABLET, FILM COATED ORAL
Qty: 30 TABLET | Refills: 1 | Status: SHIPPED | OUTPATIENT
Start: 2017-11-08 | End: 2018-01-16 | Stop reason: SDUPTHER

## 2017-12-01 ENCOUNTER — HOSPITAL ENCOUNTER (EMERGENCY)
Facility: HOSPITAL | Age: 27
Discharge: HOME | End: 2017-12-01
Attending: SURGERY
Payer: MEDICAID

## 2017-12-01 ENCOUNTER — TELEPHONE (OUTPATIENT)
Dept: INTERNAL MEDICINE | Facility: CLINIC | Age: 27
End: 2017-12-01

## 2017-12-01 VITALS
TEMPERATURE: 97 F | SYSTOLIC BLOOD PRESSURE: 129 MMHG | RESPIRATION RATE: 18 BRPM | HEART RATE: 73 BPM | DIASTOLIC BLOOD PRESSURE: 78 MMHG

## 2017-12-01 DIAGNOSIS — L02.91 ABSCESS: Primary | ICD-10-CM

## 2017-12-01 PROCEDURE — 25000003 PHARM REV CODE 250: Performed by: SURGERY

## 2017-12-01 PROCEDURE — 99284 EMERGENCY DEPT VISIT MOD MDM: CPT | Mod: 25

## 2017-12-01 PROCEDURE — 10060 I&D ABSCESS SIMPLE/SINGLE: CPT

## 2017-12-01 PROCEDURE — 87070 CULTURE OTHR SPECIMN AEROBIC: CPT

## 2017-12-01 RX ORDER — LIDOCAINE HYDROCHLORIDE 10 MG/ML
10 INJECTION, SOLUTION EPIDURAL; INFILTRATION; INTRACAUDAL; PERINEURAL
Status: COMPLETED | OUTPATIENT
Start: 2017-12-01 | End: 2017-12-01

## 2017-12-01 RX ORDER — IBUPROFEN 800 MG/1
800 TABLET ORAL EVERY 6 HOURS PRN
Qty: 20 TABLET | Refills: 0 | Status: SHIPPED | OUTPATIENT
Start: 2017-12-01 | End: 2021-01-25

## 2017-12-01 RX ORDER — MUPIROCIN 20 MG/G
OINTMENT TOPICAL 3 TIMES DAILY
Qty: 15 G | Refills: 0 | Status: SHIPPED | OUTPATIENT
Start: 2017-12-01 | End: 2017-12-11

## 2017-12-01 RX ORDER — CLINDAMYCIN HYDROCHLORIDE 300 MG/1
300 CAPSULE ORAL 4 TIMES DAILY
Qty: 28 CAPSULE | Refills: 0 | Status: SHIPPED | OUTPATIENT
Start: 2017-12-01 | End: 2017-12-08

## 2017-12-01 RX ORDER — MUPIROCIN 20 MG/G
1 OINTMENT TOPICAL
Status: COMPLETED | OUTPATIENT
Start: 2017-12-01 | End: 2017-12-01

## 2017-12-01 RX ADMIN — MUPIROCIN 22 G: 20 OINTMENT TOPICAL at 12:12

## 2017-12-01 RX ADMIN — LIDOCAINE HYDROCHLORIDE 100 MG: 10 INJECTION, SOLUTION EPIDURAL; INFILTRATION; INTRACAUDAL; PERINEURAL at 12:12

## 2017-12-01 NOTE — TELEPHONE ENCOUNTER
States abscess is filled with pus, instructed patient to go to the ER for evaluation. Verbalized understanding.

## 2017-12-01 NOTE — ED PROVIDER NOTES
[facility]                                     November 30, 2017                   Chief Complaint  27 y.o. female with Abscess (to the left groin)    History of Present Illness  Cortez Rollins presents to the emergency room with left groin abscess today  Pt on exam has a 2 cm left anterior thigh abscess with no cellulitic spread   Patient has no signs of fever, no signs of MRSA infection on evaluation  Patient states she's had these issues in the past and needs an I&D ASAP    The history is provided by the patient  Medical history: ALLERGY, GERD, thyroid dysfunction  Surgical history: Adenoidectomy, tonsillectomy, cholecystectomy  No Known Allergies     Review of Systems and Physical Exam     Review of Systems  -- Constitution - no fever, denies fatigue, no weakness, no chills  -- Eyes - no tearing or redness, no visual disturbance  -- Ear, Nose - no tinnitus or earache, no nasal congestion or discharge  -- Mouth,Throat - no sore throat, no toothache, normal voice, normal swallowing  -- Respiratory - denies cough and congestion, no shortness of breath, no PETERSON  -- Cardiovascular - denies chest pain, no palpitations, denies claudication  -- Gastrointestinal - denies abdominal pain, nausea, vomiting, or diarrhea  -- Musculoskeletal - denies back pain, negative for myalgias and arthralgias   -- Neurological - no headache, denies weakness or seizure; no LOC  -- Skin - left thigh abscess    Vital Signs  -- Temperature is 96.5 °F (35.8 °C).   -- Her blood pressure is 129/78 and her pulse is 73.   -- Her respiration is 18.      Physical Exam  -- Nursing note and vitals reviewed  -- Head: Atraumatic. Normocephalic. No obvious abnormality  -- Eyes: Pupils are equal and reactive to light. Normal conjunctiva and lids  -- Cardiac: Normal rate, regular rhythm and normal heart sounds  -- Pulmonary: Normal respiratory effort, breath sounds clear to auscultation  -- Abdominal: Soft, no tenderness. Normal bowel  sounds. Normal liver edge  -- Musculoskeletal: Normal range of motion, no effusions. Joints stable   -- Neurological: No focal deficits. Showed good interaction with staff  -- Vascular: Posterior tibial, dorsalis pedis and radial pulses 2+ bilaterally    -- Lymphatics: No cervical or peripheral lymphadenopathy. No edema noted  -- Skin: 2 cm left anterior thigh abscess just below the crease of the groin    Emergency Room Course     I & D - Incision and Drainage  -- Performed by: Flash Campa M.D.  -- Date/Time: 1:40 PM 12/1/2017    -- Type: abscess  -- Location: left thigh  -- Anesthesia: local infiltration  -- Local anesthetic: lidocaine 1%   -- Anesthetic total: 10 ml  -- Scalpel size: 11  -- Incision type: single straight  -- Complexity: simple  -- Drainage: pus  -- Drainage amount: scant  -- Wound treatment: incision  -- Packing material: 1/4 in gauze  -- Wound culture taken    Diagnosis  -- The encounter diagnosis was Abscess.    Disposition and Plan  -- Disposition: home  -- Condition: stable  -- Follow-up: Patient to follow up with Emilia Davis NP in 1-2 days.  -- I advised the patient that we have found no life threatening condition today  -- At this time, I believe the patient is clinically stable for discharge.   -- The patient acknowledges that close follow up with a MD is required   -- Patient agrees to comply with all instruction and direction given in the ER    This note is dictated on Dragon Natural Speaking word recognition program.  There are word recognition mistakes that are occasionally missed on review.           Falsh Campa MD  12/01/17 [id number]

## 2017-12-01 NOTE — TELEPHONE ENCOUNTER
----- Message from Rosalie Rubio sent at 2017 10:14 AM CST -----  Contact: self  Cortez Rollins  MRN: [id number]  : 1990  PCP: Emilia Davis  Home Phone      [phone]  Work Phone      Not on file.  Mobile          [phone]      MESSAGE:   Patient thinks she has staph infection on her panty line on her left leg and would like to know if she should come in here or go to the ER and have it lanced. Please return call @ [phone]. Thanks.

## 2017-12-04 ENCOUNTER — OFFICE VISIT (OUTPATIENT)
Dept: INTERNAL MEDICINE | Facility: CLINIC | Age: 27
End: 2017-12-04
Payer: MEDICAID

## 2017-12-04 ENCOUNTER — TELEPHONE (OUTPATIENT)
Dept: INTERNAL MEDICINE | Facility: CLINIC | Age: 27
End: 2017-12-04

## 2017-12-04 VITALS
BODY MASS INDEX: 42.47 KG/M2 | SYSTOLIC BLOOD PRESSURE: 100 MMHG | HEIGHT: 62 IN | DIASTOLIC BLOOD PRESSURE: 56 MMHG | WEIGHT: 230.81 LBS | HEART RATE: 84 BPM | TEMPERATURE: 98 F | RESPIRATION RATE: 18 BRPM

## 2017-12-04 DIAGNOSIS — L03.119 CELLULITIS AND ABSCESS OF LEG: Primary | ICD-10-CM

## 2017-12-04 DIAGNOSIS — L02.419 CELLULITIS AND ABSCESS OF LEG: Primary | ICD-10-CM

## 2017-12-04 LAB — BACTERIA SPEC AEROBE CULT: NORMAL

## 2017-12-04 PROCEDURE — 99213 OFFICE O/P EST LOW 20 MIN: CPT | Mod: S$PBB,,, | Performed by: NURSE PRACTITIONER

## 2017-12-04 PROCEDURE — 99999 PR PBB SHADOW E&M-EST. PATIENT-LVL IV: CPT | Mod: PBBFAC,,, | Performed by: NURSE PRACTITIONER

## 2017-12-04 PROCEDURE — 99214 OFFICE O/P EST MOD 30 MIN: CPT | Mod: PBBFAC | Performed by: NURSE PRACTITIONER

## 2017-12-04 RX ORDER — SULFAMETHOXAZOLE AND TRIMETHOPRIM 800; 160 MG/1; MG/1
1 TABLET ORAL 2 TIMES DAILY
Qty: 28 TABLET | Refills: 0 | Status: SHIPPED | OUTPATIENT
Start: 2017-12-04 | End: 2017-12-18

## 2017-12-04 RX ORDER — HYDROCODONE BITARTRATE AND ACETAMINOPHEN 7.5; 325 MG/1; MG/1
1 TABLET ORAL EVERY 6 HOURS PRN
Qty: 12 TABLET | Refills: 0 | Status: SHIPPED | OUTPATIENT
Start: 2017-12-04 | End: 2018-09-18 | Stop reason: ALTCHOICE

## 2017-12-04 NOTE — TELEPHONE ENCOUNTER
She is on abx. If it becomes more hard and red, drainage stops will need to go back to ER for another Iand D. Otherwise please put her on schedule for Wed

## 2017-12-04 NOTE — TELEPHONE ENCOUNTER
----- Message from Elissa Gamboa sent at 2017  1:30 PM CST -----  Contact: Alonso Rollins  MRN: [id number]  : 1990  PCP: Emilia Davis  Home Phone      [phone]  Work Phone      Not on file.  Mobile          [phone]      MESSAGE:  Emilia just called in Bactrim for pt. [facility] wants to know if Emilia wanted the brand name or if they can fill the generic because they don't have the brand name in stock. Please advise.

## 2017-12-04 NOTE — PROGRESS NOTES
Subjective:       Patient ID: Cortez Rollins is a 27 y.o. female.    Chief Complaint: ER Follow up (abscess)    HPI: Pt presents to clinic today known to me with c/o pain and redness to left upper leg. She was seen in ER last week and had abscess I/D and packed. She pulled packing yesterday as told to do so and now reports more redness and swelling. Pain. No fever. Had minimal drainage after packing removed  Review of Systems   Constitutional: Negative for activity change and unexpected weight change.   HENT: Negative for hearing loss, rhinorrhea and trouble swallowing.    Eyes: Negative for discharge and visual disturbance.   Respiratory: Negative for chest tightness and wheezing.    Cardiovascular: Negative for chest pain and palpitations.   Gastrointestinal: Positive for diarrhea. Negative for blood in stool, constipation and vomiting.   Endocrine: Negative for polydipsia and polyuria.   Genitourinary: Negative for difficulty urinating, dysuria, hematuria and menstrual problem.   Musculoskeletal: Negative for arthralgias, joint swelling and neck pain.   Skin: Positive for wound (left upper thigh).   Neurological: Negative for weakness and headaches.   Psychiatric/Behavioral: Negative for confusion and dysphoric mood.       Objective:      Physical Exam   Constitutional: She is oriented to person, place, and time. She appears well-developed and well-nourished.   HENT:   Head: Normocephalic and atraumatic.   Eyes: Conjunctivae and EOM are normal. Pupils are equal, round, and reactive to light.   Neck: Normal range of motion. Neck supple.   Cardiovascular: Normal rate, regular rhythm, normal heart sounds and intact distal pulses.    No murmur heard.  Pulmonary/Chest: Effort normal and breath sounds normal. No respiratory distress. She has no wheezes. She has no rales.   Abdominal: Soft. Bowel sounds are normal. She exhibits no distension and no mass. There is no tenderness. There is no guarding.   Musculoskeletal:  Normal range of motion.        Legs:  Redness, hard area just below the incision from weekend. No drainage with manipulation. Area of redness marked.    Neurological: She is alert and oriented to person, place, and time.   Skin: Skin is warm and dry. Capillary refill takes less than 2 seconds. There is erythema.   Psychiatric: She has a normal mood and affect. Her behavior is normal. Judgment and thought content normal.       Assessment:       1. Cellulitis and abscess of leg        Plan:   Cortez was seen today for er follow up.    Diagnoses and all orders for this visit:    Cellulitis and abscess of leg  -     BACTRIM -160 mg Tab; Take 1 tablet by mouth 2 (two) times daily.  -     hydrocodone-acetaminophen 7.5-325mg (NORCO) 7.5-325 mg per tablet; Take 1 tablet by mouth every 6 (six) hours as needed for Pain.    added bactrim to clinda and added probiotic daily as well. Cont motrin TID and norco if needed for pain. No abscess noted. Feels like a lymph node chain where she reports tenderness. Will see her Wed am to re evaluate the cellulitis. If fever, redness, pain, chills, swelling, etc worsens ER

## 2017-12-04 NOTE — TELEPHONE ENCOUNTER
Patient went to the ER on Friday for an abscess. Patient removed the packing on Sunday and says that since yesterday the area has become hard, warm to touch, and red. There are no openings on the schedule until Wednesday. What do you recommend?

## 2017-12-06 ENCOUNTER — OFFICE VISIT (OUTPATIENT)
Dept: INTERNAL MEDICINE | Facility: CLINIC | Age: 27
End: 2017-12-06
Payer: MEDICAID

## 2017-12-06 VITALS
HEART RATE: 64 BPM | DIASTOLIC BLOOD PRESSURE: 64 MMHG | RESPIRATION RATE: 18 BRPM | HEIGHT: 62 IN | SYSTOLIC BLOOD PRESSURE: 102 MMHG | WEIGHT: 226.88 LBS | BODY MASS INDEX: 41.75 KG/M2

## 2017-12-06 DIAGNOSIS — M72.2 PLANTAR FASCIITIS: ICD-10-CM

## 2017-12-06 DIAGNOSIS — L03.90 CELLULITIS, UNSPECIFIED CELLULITIS SITE: Primary | ICD-10-CM

## 2017-12-06 PROCEDURE — 99214 OFFICE O/P EST MOD 30 MIN: CPT | Mod: PBBFAC | Performed by: NURSE PRACTITIONER

## 2017-12-06 PROCEDURE — 99213 OFFICE O/P EST LOW 20 MIN: CPT | Mod: S$PBB,,, | Performed by: NURSE PRACTITIONER

## 2017-12-06 PROCEDURE — 99999 PR PBB SHADOW E&M-EST. PATIENT-LVL IV: CPT | Mod: PBBFAC,,, | Performed by: NURSE PRACTITIONER

## 2017-12-06 NOTE — PROGRESS NOTES
Subjective:       Patient ID: Cortez Rollins is a 27 y.o. female.    Chief Complaint: Follow-up (abscess)    HPI: Pt presents to clinic today known to  Me with c/o needing cellulitis re eval. She was started on clinda from ER and then saw me on Monday and thigh was red, hot and swollen. I started bactrim. She reports that the redness did spread a little outside the line yesterday but it has receded today and is not hot and has lightened in redness significantly. She has not had fever, chills or body aches. She reports that she is taking the and as rx and eating yogurt and taking probiotic    She also reports that she has plantars fascitis. Was seeing a podiatrist and has done PT, steroids and boot therapy with no relief  Review of Systems   Constitutional: Negative for activity change and unexpected weight change.   HENT: Negative for hearing loss, rhinorrhea and trouble swallowing.    Eyes: Negative for discharge and visual disturbance.   Respiratory: Negative for chest tightness and wheezing.    Cardiovascular: Negative for chest pain and palpitations.   Gastrointestinal: Negative for blood in stool, constipation, diarrhea and vomiting.   Endocrine: Negative for polydipsia and polyuria.   Genitourinary: Negative for difficulty urinating, dysuria, hematuria and menstrual problem.   Musculoskeletal: Negative for arthralgias, joint swelling and neck pain.        Bilateral foot pain   Skin: Positive for color change and wound.   Neurological: Negative for weakness and headaches.   Psychiatric/Behavioral: Negative for confusion and dysphoric mood.       Objective:      Physical Exam   Constitutional: She is oriented to person, place, and time. She appears well-developed and well-nourished.   HENT:   Head: Normocephalic and atraumatic.   Right Ear: External ear normal.   Left Ear: External ear normal.   Nose: Nose normal.   Mouth/Throat: Oropharynx is clear and moist.   Eyes: Conjunctivae and EOM are normal. Pupils are  equal, round, and reactive to light.   Neck: Normal range of motion. Neck supple.   Cardiovascular: Normal rate, regular rhythm, normal heart sounds and intact distal pulses.    Pulmonary/Chest: Effort normal and breath sounds normal.   Abdominal: Soft. Bowel sounds are normal.   Musculoskeletal: Normal range of motion.   Neurological: She is alert and oriented to person, place, and time.   Skin: Skin is warm and dry. Capillary refill takes less than 2 seconds.   She has no more hardened areas in left groin. The redness is now light pink and well within border drawn   Psychiatric: She has a normal mood and affect. Her behavior is normal. Judgment and thought content normal.   Nursing note and vitals reviewed.      Assessment:       1. Cellulitis, unspecified cellulitis site    2. Plantar fasciitis        Plan:   Cortez was seen today for follow-up.    Diagnoses and all orders for this visit:    Cellulitis, unspecified cellulitis site   -Resolving, cont abx till complete    Plantar fasciitis  -     Ambulatory Referral to Podiatry

## 2017-12-29 ENCOUNTER — PATIENT MESSAGE (OUTPATIENT)
Dept: INTERNAL MEDICINE | Facility: CLINIC | Age: 27
End: 2017-12-29

## 2018-01-09 ENCOUNTER — PATIENT MESSAGE (OUTPATIENT)
Dept: INTERNAL MEDICINE | Facility: CLINIC | Age: 28
End: 2018-01-09

## 2018-01-09 NOTE — TELEPHONE ENCOUNTER
Pt is taking her acid reflux medication as directed but continues to have severe reflux that keeps her awake at night. Requesting other recommendations for the heartburn. Please advise.

## 2018-01-16 DIAGNOSIS — F32.A ANXIETY AND DEPRESSION: ICD-10-CM

## 2018-01-16 DIAGNOSIS — F41.9 ANXIETY AND DEPRESSION: ICD-10-CM

## 2018-01-16 RX ORDER — CITALOPRAM 40 MG/1
TABLET, FILM COATED ORAL
Qty: 30 TABLET | Refills: 1 | Status: SHIPPED | OUTPATIENT
Start: 2018-01-16 | End: 2018-04-01 | Stop reason: SDUPTHER

## 2018-02-06 ENCOUNTER — PATIENT MESSAGE (OUTPATIENT)
Dept: INTERNAL MEDICINE | Facility: CLINIC | Age: 28
End: 2018-02-06

## 2018-02-07 DIAGNOSIS — F41.9 ANXIETY: Primary | ICD-10-CM

## 2018-02-07 RX ORDER — BUSPIRONE HYDROCHLORIDE 10 MG/1
10 TABLET ORAL 3 TIMES DAILY
Qty: 30 TABLET | Refills: 1 | Status: SHIPPED | OUTPATIENT
Start: 2018-02-07 | End: 2018-12-14 | Stop reason: SDUPTHER

## 2018-02-09 ENCOUNTER — PATIENT MESSAGE (OUTPATIENT)
Dept: INTERNAL MEDICINE | Facility: CLINIC | Age: 28
End: 2018-02-09

## 2018-03-07 ENCOUNTER — OFFICE VISIT (OUTPATIENT)
Dept: OCCUPATIONAL MEDICINE | Facility: CLINIC | Age: 28
End: 2018-03-07
Payer: COMMERCIAL

## 2018-03-07 VITALS
RESPIRATION RATE: 20 BRPM | DIASTOLIC BLOOD PRESSURE: 73 MMHG | HEART RATE: 72 BPM | TEMPERATURE: 98 F | HEIGHT: 62 IN | BODY MASS INDEX: 42.33 KG/M2 | OXYGEN SATURATION: 96 % | SYSTOLIC BLOOD PRESSURE: 106 MMHG | WEIGHT: 230 LBS
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
## 2018-03-07 PROCEDURE — 99214 OFFICE O/P EST MOD 30 MIN: CPT | Mod: S$GLB,,, | Performed by: FAMILY MEDICINE

## 2018-03-07 RX ORDER — MUPIROCIN 20 MG/G
OINTMENT TOPICAL 2 TIMES DAILY
Qty: 30 G | Refills: 0 | Status: SHIPPED | OUTPATIENT
Start: 2018-03-07 | End: 2018-08-21

## 2018-03-07 RX ORDER — NAPROXEN 500 MG/1
500 TABLET ORAL 2 TIMES DAILY WITH MEALS
Qty: 20 TABLET | Refills: 0 | Status: SHIPPED | OUTPATIENT
Start: 2018-03-07 | End: 2018-08-21

## 2018-03-07 RX ORDER — CEPHALEXIN 250 MG/1
250 CAPSULE ORAL 4 TIMES DAILY
Qty: 40 CAPSULE | Refills: 0 | Status: SHIPPED | OUTPATIENT
Start: 2018-03-07 | End: 2018-03-17

## 2018-03-07 NOTE — PROGRESS NOTES
"Subjective:       Patient ID: Cortez Rollins is a 27 y.o. female.    Vitals:  height is 5' 2" (1.575 m) and weight is 104.3 kg (230 lb). Her oral temperature is 98.3 °F (36.8 °C). Her blood pressure is 106/73 and her pulse is 72. Her respiration is 20 and oxygen saturation is 96%.     Chief Complaint: Animal Bite    Animal Bite    The incident occurred just prior to arrival. The incident occurred at work. She came to the ER via personal transport. There is an injury to the left forearm. The pain is severe. It is unlikely that a foreign body is present. Pertinent negatives include no chest pain, no fussiness, no numbness, no visual disturbance, no abdominal pain, no nausea, no vomiting, no headaches, no inability to bear weight, no neck pain, no pain when bearing weight, no focal weakness, no light-headedness, no tingling, no weakness, no cough and no difficulty breathing. There have been no prior injuries to these areas. She is right-handed. Her tetanus status is unknown. She has been behaving normally. There were no sick contacts. She has received no recent medical care.     Review of Systems   Constitution: Negative for weakness and malaise/fatigue.   HENT: Negative for nosebleeds.    Eyes: Negative for visual disturbance.   Cardiovascular: Negative for chest pain and syncope.   Respiratory: Negative for cough and shortness of breath.    Musculoskeletal: Negative for back pain, joint pain and neck pain.   Gastrointestinal: Negative for abdominal pain, nausea and vomiting.   Genitourinary: Negative for hematuria.   Neurological: Negative for dizziness, focal weakness, headaches, light-headedness, numbness and tingling.       Objective:      Physical Exam   Constitutional: She is oriented to person, place, and time. She appears well-developed and well-nourished. She is cooperative.  Non-toxic appearance. She does not appear ill. No distress.   HENT:   Head: Normocephalic and atraumatic. Head is without abrasion, without " contusion and without laceration.   Right Ear: Hearing, tympanic membrane, external ear and ear canal normal. No hemotympanum.   Left Ear: Hearing, tympanic membrane, external ear and ear canal normal. No hemotympanum.   Nose: Nose normal. No mucosal edema, rhinorrhea or nasal deformity. No epistaxis. Right sinus exhibits no maxillary sinus tenderness and no frontal sinus tenderness. Left sinus exhibits no maxillary sinus tenderness and no frontal sinus tenderness.   Mouth/Throat: Uvula is midline, oropharynx is clear and moist and mucous membranes are normal. No trismus in the jaw. Normal dentition. No uvula swelling. No posterior oropharyngeal erythema.   Eyes: Conjunctivae, EOM and lids are normal. Pupils are equal, round, and reactive to light. Right eye exhibits no discharge. Left eye exhibits no discharge. No scleral icterus.   Sclera clear bilat   Neck: Trachea normal, normal range of motion, full passive range of motion without pain and phonation normal. Neck supple. No spinous process tenderness and no muscular tenderness present. No neck rigidity. No tracheal deviation present.   Cardiovascular: Normal rate, regular rhythm, normal heart sounds, intact distal pulses and normal pulses.    Pulmonary/Chest: Effort normal and breath sounds normal. No respiratory distress.   Abdominal: Soft. Normal appearance and bowel sounds are normal. She exhibits no distension, no pulsatile midline mass and no mass. There is no tenderness.   Musculoskeletal: Normal range of motion. She exhibits no edema or deformity.        Left wrist: She exhibits tenderness, swelling and laceration.        Arms:  Neurological: She is alert and oriented to person, place, and time. She has normal strength. No cranial nerve deficit or sensory deficit. She exhibits normal muscle tone. She displays no seizure activity. Coordination normal. GCS eye subscore is 4. GCS verbal subscore is 5. GCS motor subscore is 6.   Skin: Skin is warm, dry and  intact. Capillary refill takes less than 2 seconds. No abrasion, no bruising, no burn, no ecchymosis and no laceration noted. She is not diaphoretic. No pallor.   Psychiatric: She has a normal mood and affect. Her speech is normal and behavior is normal. Judgment and thought content normal. Cognition and memory are normal.   Nursing note and vitals reviewed.          Assessment:       1. Dog bite, initial encounter        Plan:         Dog bite, initial encounter    Other orders  -     cephALEXin (KEFLEX) 250 MG capsule; Take 1 capsule (250 mg total) by mouth 4 (four) times daily.  Dispense: 40 capsule; Refill: 0  -     mupirocin (BACTROBAN) 2 % ointment; Apply topically 2 (two) times daily.  Dispense: 30 g; Refill: 0  -     naproxen (NAPROSYN) 500 MG tablet; Take 1 tablet (500 mg total) by mouth 2 (two) times daily with meals.  Dispense: 20 tablet; Refill: 0  -     diptheria-tetanus toxoids 2-2 Lf unit/0.5 mL injection 0.5 mL; Inject 0.5 mLs into the muscle one time.      Please drink plenty of fluids.  Please get plenty of rest.  Please return here or go to the Emergency Department for any concerns or worsening of condition.  If you were prescribed antibiotics, please take them to completion.  If you were prescribed a narcotic medication, do not drive or operate heavy equipment or machinery while taking these medications.      If not allergic, please take over the counter Tylenol (Acetaminophen) and/or Motrin (Ibuprofen) as directed for control of pain and/or fever.    Your tetanus was brought up to date if needed.    Keep wound clean and dry.  You may use a plastic bag to keep area dry while showering    Clean wound once or twice a day with soap and water, then apply antibiotic ointment and redress wound.      Please follow up with your primary care doctor or specialist as needed.  Emilia Davis NP  [phone]

## 2018-03-07 NOTE — PATIENT INSTRUCTIONS
Please drink plenty of fluids.  Please get plenty of rest.  Please return here or go to the Emergency Department for any concerns or worsening of condition.  If you were prescribed antibiotics, please take them to completion.  If you were prescribed a narcotic medication, do not drive or operate heavy equipment or machinery while taking these medications.      If not allergic, please take over the counter Tylenol (Acetaminophen) and/or Motrin (Ibuprofen) as directed for control of pain and/or fever.    Your tetanus was brought up to date if needed.    Keep wound clean and dry.  You may use a plastic bag to keep area dry while showering    Clean wound once or twice a day with soap and water, then apply antibiotic ointment and redress wound.    May return to full duty as tolerated.    Please follow up with your primary care doctor or specialist as needed.  Emilia Davis, SOLO  [phone]      Dog Bite  A dog bite can cause a wound deep enough to break the skin. In such cases, the wound is cleaned and sometimes closed. If the wound is closed, it is usually not completely closed. This is so that fluid can drain if the wound becomes infected. Often, wounds will be left open to heal. In addition to wound care, a tetanus shot may be given, if needed.    Home care  · Wash your hands well with soap and warm water before and after caring for the wound. This helps lower the risk of infection.  · Care for the wound as directed. If a dressing was applied to the wound, be sure to change it as directed.  · If the wound bleeds, place a clean, soft cloth on the wound. Then firmly apply pressure until the bleeding stops. This may take up to 5 minutes. Do not release the pressure and look at the wound during this time.  · Most wounds heal within 10 days. But an infection can occur even with proper treatment. So be sure to check the wound daily for signs of infection (see below).  · Antibiotics may be prescribed. These help prevent  or treat infection. If youre given antibiotics, take them as directed. Also be sure to complete the medicines.  Rabies prevention  Rabies is a virus that can be carried in certain animals. These can include domestic animals such as dogs and cats. Pets fully vaccinated against rabies (2 shots) are at very low risk of infection. But because human rabies is almost always fatal, any biting pet should be confined for 10 days as an extra precaution. In general, if there is a risk for rabies, the following steps may need to be taken:  · If someones pet dog has bitten you, it should be kept in a secure area for the next 10 days to watch for signs of illness. (If the pet owner wont allow this, contact your local animal control center.) If the dog becomes ill or dies during that time, contact your local animal control center at once so the animal may be tested for rabies. If the dog stays healthy for the next 10 days, there is no danger of rabies in the animal or you.  ¨ If a stray dog bit you, contact your local animal control center. They can give information on capture, quarantine, and animal rabies testing.  ¨ If you cant find the animal that bit you in the next 2 days, and if rabies exists in your area, you may need to receive the rabies vaccine series. Call your healthcare provider right away. Or, return to the emergency department promptly.  ¨ All animal bites should be reported to the local animal control center. If you were not given a form to fill out, you can report this yourself.  Follow-up care  Follow up with your healthcare provider, or as directed.  When to seek medical advice  Call your healthcare provider right away if any of these occur:  · Signs of infection:  ¨ Spreading redness or warmth from the wound  ¨ Increased pain or swelling  ¨ Fever of 100.4ºF (38ºC) or higher, or as directed by your healthcare provider  ¨ Colored fluid or pus draining from the wound  · Signs of rabies  infection:  ¨ Headache  ¨ Confusion  ¨ Strange behavior  ¨ Increased salivating and drooling  ¨ Seizure  · Decreased ability to move any body part near the wound  · Bleeding that can't be stopped after 5 minutes of firm pressure  Date Last Reviewed: 3/1/2017  © 4733-0593 [employer]. [address] [place] [address]. All rights reserved. This information is not intended as a substitute for professional medical care. Always follow your healthcare professional's instructions.          Laceration: All Closures  A laceration is a cut through the skin. This will usually require stitches (sutures) or staples if it is deep. Minor cuts may be treated with a surgical tape closure or skin glue.    Home care  · Your healthcare provider may prescribe an antibiotic. This is to help prevent infection. Follow all instructions for taking this medicine. Take the medicine every day until it is gone or you are told to stop. You should not have any left over.  · The healthcare provider may prescribe medicines for pain. Follow instructions for taking them.  · Follow the healthcare providers instructions on how to care for the cut.  · Keep the wound clean and dry. Do not get the wound wet until you are told it is okay to do so. If the area gets wet, gently pat it dry with a clean cloth. Replace the wet bandage with a dry one.  · If a bandage was applied and it becomes wet or dirty, replace it. Otherwise, leave it in place for the first 24 hours.  · Caring for sutures or staples: Once you no longer need to keep them dry, clean the wound daily. First, remove the bandage. Then wash the area gently with soap and warm water, or as directed by the health care provider. Use a wet cotton swab to loosen and remove any blood or crust that forms. After cleaning, apply a thin layer of antibiotic ointment if advised. Then put on a new bandage unless you are told not to.  · Caring for skin glue: Dont put apply liquid,  ointment, or cream on the wound while the glue is in place. Avoid activities that cause heavy sweating. Protect the wound from sunlight. Do not scratch, rub, or pick at the adhesive film. Do not place tape directly over the film. The glue should peel off within 5 to 10 days.   · Caring for surgical tape: Keep the area dry. If it gets wet, blot it dry with a clean towel. Surgical tape usually falls off within 7 to 10 days. If it has not fallen off after 10 days, you can take it off yourself. Put mineral oil or petroleum jelly on a cotton ball and gently rub the tape until it is removed.  · Once you can get the wound wet, you may shower as usual but do not soak the wound in water (no tub baths or swimming)  · Even with proper treatment, a wound infection may sometimes occur. Check the wound daily for signs of infection listed below.  Scalp wounds  During the first two days, you may carefully rinse your hair in the shower to remove blood, glass or dirt particles. After two days, you may shower and shampoo your hair normally. Do not soak your scalp in the tub or go swimming until the stitches or staples have been removed. Talk with your healthcare provider before applying any antibiotic ointment to the wound.  Mouth wounds  Eat soft foods to reduce pain. If the cut is inside of your mouth, clean by rinsing after each meal and at bedtime with a mixture of equal parts water and hydrogen peroxide (do not swallow!). Or, you can use a cotton swab to directly apply hydrogen peroxide onto the cut. Mouth wounds can be painful when eating. You may use an over-the-counter local numbing solution for pain relief. If this is not available, you may use any numbing solution intended for teething babies. You may apply this directly to the sores with a cotton-tip swab or with your finger.  Follow-up care  Follow up with your healthcare provider as advised. Ask your healthcare provider how long sutures should be left in place. Be sure to  return for suture removal as directed. If dissolving stitches were used in the mouth, these should fall out or dissolve without the need for removal. If tape closures were used, remove them yourself when your provider recommends if they have not fallen off on their own. If skin glue was used, the film will wear off by itself.  When to seek medical advice  Call your healthcare provider right away if any of these occur:  · Wound bleeding not controlled by direct pressure  · Signs of infection, including increasing pain in the wound, increasing wound redness or swelling, or pus or bad odor coming from the wound  · Fever of 100.4°F (38.ºC) or higher or as directed by your healthcare provider  · Stitches or staples come apart or fall out or surgical tape falls off before 7 days  · Wound edges re-open  · Wound changes colors  · Numbness around the wound   · Decreased movement around the injured area  Date Last Reviewed: 6/14/2015  © 2032-7454 [employer]. [address] [place] [address], [place] [address]. All rights reserved. This information is not intended as a substitute for professional medical care. Always follow your healthcare professional's instructions.

## 2018-03-10 ENCOUNTER — TELEPHONE (OUTPATIENT)
Dept: URGENT CARE | Facility: CLINIC | Age: 28
End: 2018-03-10

## 2018-04-01 DIAGNOSIS — F32.A ANXIETY AND DEPRESSION: ICD-10-CM

## 2018-04-01 DIAGNOSIS — F41.9 ANXIETY AND DEPRESSION: ICD-10-CM

## 2018-04-02 RX ORDER — CITALOPRAM 40 MG/1
TABLET, FILM COATED ORAL
Qty: 30 TABLET | Refills: 1 | Status: SHIPPED | OUTPATIENT
Start: 2018-04-02 | End: 2018-07-19 | Stop reason: SDUPTHER

## 2018-04-18 ENCOUNTER — HOSPITAL ENCOUNTER (OUTPATIENT)
Dept: RADIOLOGY | Facility: HOSPITAL | Age: 28
Discharge: HOME | End: 2018-04-18
Attending: PODIATRIST
Payer: MEDICAID

## 2018-04-18 DIAGNOSIS — M72.2 PLANTAR FASCIAL FIBROMATOSIS: ICD-10-CM

## 2018-04-18 PROCEDURE — 73720 MRI LWR EXTREMITY W/O&W/DYE: CPT | Mod: TC,RT

## 2018-04-18 PROCEDURE — A9585 GADOBUTROL INJECTION: HCPCS | Performed by: PODIATRIST

## 2018-04-18 PROCEDURE — 73720 MRI LWR EXTREMITY W/O&W/DYE: CPT | Mod: 26,RT,, | Performed by: RADIOLOGY

## 2018-04-18 PROCEDURE — 25500020 PHARM REV CODE 255: Performed by: PODIATRIST

## 2018-04-18 RX ORDER — GADOBUTROL 604.72 MG/ML
10 INJECTION INTRAVENOUS
Status: COMPLETED | OUTPATIENT
Start: 2018-04-18 | End: 2018-04-18

## 2018-04-18 RX ADMIN — GADOBUTROL 10 ML: 604.72 INJECTION INTRAVENOUS at 07:04

## 2018-05-30 ENCOUNTER — PATIENT MESSAGE (OUTPATIENT)
Dept: INTERNAL MEDICINE | Facility: CLINIC | Age: 28
End: 2018-05-30

## 2018-07-19 DIAGNOSIS — F32.A ANXIETY AND DEPRESSION: ICD-10-CM

## 2018-07-19 DIAGNOSIS — F41.9 ANXIETY AND DEPRESSION: ICD-10-CM

## 2018-07-19 RX ORDER — CITALOPRAM 40 MG/1
TABLET, FILM COATED ORAL
Qty: 30 TABLET | Refills: 0 | Status: SHIPPED | OUTPATIENT
Start: 2018-07-19 | End: 2018-09-08 | Stop reason: SDUPTHER

## 2018-08-16 ENCOUNTER — PATIENT MESSAGE (OUTPATIENT)
Dept: INTERNAL MEDICINE | Facility: CLINIC | Age: 28
End: 2018-08-16

## 2018-08-16 ENCOUNTER — TELEPHONE (OUTPATIENT)
Dept: INTERNAL MEDICINE | Facility: CLINIC | Age: 28
End: 2018-08-16

## 2018-08-16 DIAGNOSIS — R11.2 NAUSEA AND VOMITING, INTRACTABILITY OF VOMITING NOT SPECIFIED, UNSPECIFIED VOMITING TYPE: Primary | ICD-10-CM

## 2018-08-16 DIAGNOSIS — K52.9 CHRONIC DIARRHEA: ICD-10-CM

## 2018-08-16 NOTE — TELEPHONE ENCOUNTER
Placed a referral to GI. She has seen colorectal in the past but not GI and I think she needs GI now with N/V. I can see her next avail while waiting on GI referral

## 2018-08-16 NOTE — TELEPHONE ENCOUNTER
Called and spoke with pt about email sent reporting nausea, vomiting and diarrhea. Pt reporting that she has been experiencing constant nauseated, intermittent vomiting, and constant diarrhea for the past 2 1/2 months, and heartburn for the past 4 months. Reporting bowel movements 3-4 tiems a day. Pt reports she has been taking pepto bismol, tums, and eating a kim diet to try and help reduce symptoms but nothing helps. Reports she thought she was pregnant because of the constant nausea, but test was negative. Pt reporting emesis is a thick, yellowish color with mucus; denies noticing blood in emesis or stool . Reporting abdominal cramping all the time; no specific area is worse than the other. Wanting to know if something can be called in to help with current symptoms. Informed that pcp will be notified and staff will call back with any new orders. Please advise. Thanks

## 2018-08-16 NOTE — TELEPHONE ENCOUNTER
Called and spoke with pt. Informed pt that staff will fax GI referral to Dr. Mallory in [place]. Informed once referral is reviewed she will receive a call from their clinic to scheduled. Pt verbalized understanding.

## 2018-08-21 ENCOUNTER — TELEPHONE (OUTPATIENT)
Dept: INTERNAL MEDICINE | Facility: CLINIC | Age: 28
End: 2018-08-21

## 2018-08-21 ENCOUNTER — OFFICE VISIT (OUTPATIENT)
Dept: INTERNAL MEDICINE | Facility: CLINIC | Age: 28
End: 2018-08-21
Payer: MEDICAID

## 2018-08-21 VITALS
RESPIRATION RATE: 16 BRPM | OXYGEN SATURATION: 98 % | HEIGHT: 62 IN | SYSTOLIC BLOOD PRESSURE: 106 MMHG | DIASTOLIC BLOOD PRESSURE: 56 MMHG | WEIGHT: 249.31 LBS | BODY MASS INDEX: 45.88 KG/M2 | HEART RATE: 79 BPM

## 2018-08-21 DIAGNOSIS — E66.01 SEVERE OBESITY (BMI >= 40): ICD-10-CM

## 2018-08-21 DIAGNOSIS — R14.0 BLOATING: Primary | ICD-10-CM

## 2018-08-21 DIAGNOSIS — R53.83 FATIGUE, UNSPECIFIED TYPE: ICD-10-CM

## 2018-08-21 DIAGNOSIS — K21.9 GASTROESOPHAGEAL REFLUX DISEASE, ESOPHAGITIS PRESENCE NOT SPECIFIED: ICD-10-CM

## 2018-08-21 PROCEDURE — 99999 PR PBB SHADOW E&M-EST. PATIENT-LVL III: CPT | Mod: PBBFAC,,, | Performed by: NURSE PRACTITIONER

## 2018-08-21 PROCEDURE — 99214 OFFICE O/P EST MOD 30 MIN: CPT | Mod: S$PBB,,, | Performed by: NURSE PRACTITIONER

## 2018-08-21 PROCEDURE — 99213 OFFICE O/P EST LOW 20 MIN: CPT | Mod: PBBFAC | Performed by: NURSE PRACTITIONER

## 2018-08-21 RX ORDER — ESOMEPRAZOLE MAGNESIUM 40 MG/1
40 CAPSULE, DELAYED RELEASE ORAL
Qty: 60 CAPSULE | Refills: 1 | Status: SHIPPED | OUTPATIENT
Start: 2018-08-21 | End: 2018-08-21 | Stop reason: SDUPTHER

## 2018-08-21 RX ORDER — PANTOPRAZOLE SODIUM 40 MG/1
40 TABLET, DELAYED RELEASE ORAL 2 TIMES DAILY
Qty: 60 TABLET | Refills: 11 | Status: SHIPPED | OUTPATIENT
Start: 2018-08-21 | End: 2018-09-18

## 2018-08-21 RX ORDER — ESOMEPRAZOLE MAGNESIUM 40 MG/1
40 CAPSULE, DELAYED RELEASE ORAL
Qty: 60 CAPSULE | Refills: 1 | Status: SHIPPED | OUTPATIENT
Start: 2018-08-21 | End: 2018-08-21

## 2018-08-21 NOTE — PROGRESS NOTES
Subjective:       Patient ID: Cortez Rollins is a 27 y.o. female.    Chief Complaint: Depression and Gastroesophageal Reflux    HPI: Pt presents to clinic today known rowena with c/o GERD. She reports that it has been bad a couple months ago but last couple weeks it has exploded. She reports that she is nausea. Vomits at night. Worse with anything seasoned. Laying down makes it worse.     She also reports that she feels irritable. She is sleeping all the time. She also reports that she is very emotional. Feels quentin the celexa is not working like it used to. Snores at night. She also spasms at night.   Review of Systems   Constitutional: Positive for activity change and fatigue. Negative for unexpected weight change.   HENT: Negative for hearing loss, rhinorrhea and trouble swallowing.    Eyes: Negative for discharge and visual disturbance.   Respiratory: Negative for chest tightness and wheezing.    Cardiovascular: Negative for chest pain and palpitations.   Gastrointestinal: Positive for diarrhea, nausea and vomiting. Negative for blood in stool and constipation.   Endocrine: Positive for polydipsia and polyuria.   Genitourinary: Negative for difficulty urinating, dysuria, hematuria and menstrual problem.   Musculoskeletal: Negative for arthralgias, joint swelling and neck pain.   Neurological: Positive for headaches. Negative for weakness.   Psychiatric/Behavioral: Positive for dysphoric mood and sleep disturbance. Negative for confusion and self-injury.       Objective:      Physical Exam   Constitutional: She is oriented to person, place, and time. She appears well-developed and well-nourished.   HENT:   Head: Normocephalic and atraumatic.   Eyes: Conjunctivae and EOM are normal. Pupils are equal, round, and reactive to light.   Neck: Normal range of motion. Neck supple. No thyromegaly present.   Cardiovascular: Normal rate, regular rhythm, normal heart sounds and intact distal pulses.   No murmur  heard.  Pulmonary/Chest: Effort normal and breath sounds normal. No stridor. No respiratory distress. She has no wheezes.   Abdominal: Soft. Bowel sounds are normal. She exhibits no distension. There is no tenderness. There is no guarding.   Musculoskeletal: Normal range of motion.   Lymphadenopathy:     She has no cervical adenopathy.   Neurological: She is alert and oriented to person, place, and time. No cranial nerve deficit.   Skin: Skin is warm and dry.   Psychiatric: She has a normal mood and affect. Her behavior is normal. Judgment and thought content normal.   Nursing note and vitals reviewed.      Assessment:       1. Bloating    2. Gastroesophageal reflux disease, esophagitis presence not specified    3. Fatigue, unspecified type    4. Severe obesity (BMI >= 40)        Plan:     Problem List Items Addressed This Visit     None      Visit Diagnoses     Bloating    -  Primary    Relevant Medications    esomeprazole (NEXIUM) 40 MG capsule    Other Relevant Orders    H. pylori antigen, stool    Gastroesophageal reflux disease, esophagitis presence not specified        Relevant Medications    esomeprazole (NEXIUM) 40 MG capsule    Fatigue, unspecified type        Relevant Orders    Comprehensive metabolic panel    CBC auto differential    Hemoglobin A1c    TSH    Severe obesity (BMI >= 40)        Relevant Orders    Lipid panel          LMP 2 months ago, has Implanon and has been to OB - negative pregnancy     Check at home sleep study. She is obese and had snoring rapid leg movements and severe fatigue.     Consider wellbutrin if labs ok    2 weeks f/u for results and check up on nexium

## 2018-08-22 ENCOUNTER — LAB VISIT (OUTPATIENT)
Dept: LAB | Facility: HOSPITAL | Age: 28
End: 2018-08-22
Attending: NURSE PRACTITIONER
Payer: MEDICAID

## 2018-08-22 DIAGNOSIS — R53.83 FATIGUE, UNSPECIFIED TYPE: ICD-10-CM

## 2018-08-22 DIAGNOSIS — E66.01 SEVERE OBESITY (BMI >= 40): ICD-10-CM

## 2018-08-22 DIAGNOSIS — R14.0 BLOATING: ICD-10-CM

## 2018-08-22 LAB
ALBUMIN SERPL BCP-MCNC: 3.5 G/DL
ALP SERPL-CCNC: 51 U/L
ALT SERPL W/O P-5'-P-CCNC: 34 U/L
ANION GAP SERPL CALC-SCNC: 8 MMOL/L
AST SERPL-CCNC: 12 U/L
BASOPHILS # BLD AUTO: 0.03 K/UL
BASOPHILS NFR BLD: 0.3 %
BILIRUB SERPL-MCNC: 0.5 MG/DL
BUN SERPL-MCNC: 13 MG/DL
CALCIUM SERPL-MCNC: 9.1 MG/DL
CHLORIDE SERPL-SCNC: 108 MMOL/L
CHOLEST SERPL-MCNC: 163 MG/DL
CHOLEST/HDLC SERPL: 5.4 {RATIO}
CO2 SERPL-SCNC: 23 MMOL/L
CREAT SERPL-MCNC: 0.7 MG/DL
DIFFERENTIAL METHOD: NORMAL
EOSINOPHIL # BLD AUTO: 0.1 K/UL
EOSINOPHIL NFR BLD: 1.6 %
ERYTHROCYTE [DISTWIDTH] IN BLOOD BY AUTOMATED COUNT: 12.6 %
EST. GFR  (AFRICAN AMERICAN): >60 ML/MIN/1.73 M^2
EST. GFR  (NON AFRICAN AMERICAN): >60 ML/MIN/1.73 M^2
ESTIMATED AVG GLUCOSE: 97 MG/DL
GLUCOSE SERPL-MCNC: 104 MG/DL
HBA1C MFR BLD HPLC: 5 %
HCT VFR BLD AUTO: 45 %
HDLC SERPL-MCNC: 30 MG/DL
HDLC SERPL: 18.4 %
HGB BLD-MCNC: 14.8 G/DL
LDLC SERPL CALC-MCNC: 116.8 MG/DL
LYMPHOCYTES # BLD AUTO: 2.8 K/UL
LYMPHOCYTES NFR BLD: 31.6 %
MCH RBC QN AUTO: 30.1 PG
MCHC RBC AUTO-ENTMCNC: 32.9 G/DL
MCV RBC AUTO: 92 FL
MONOCYTES # BLD AUTO: 0.9 K/UL
MONOCYTES NFR BLD: 10 %
NEUTROPHILS # BLD AUTO: 5.1 K/UL
NEUTROPHILS NFR BLD: 56.5 %
NONHDLC SERPL-MCNC: 133 MG/DL
PLATELET # BLD AUTO: 187 K/UL
PMV BLD AUTO: 11.5 FL
POTASSIUM SERPL-SCNC: 4.6 MMOL/L
PROT SERPL-MCNC: 6.5 G/DL
RBC # BLD AUTO: 4.91 M/UL
SODIUM SERPL-SCNC: 139 MMOL/L
T4 FREE SERPL-MCNC: 0.86 NG/DL
TRIGL SERPL-MCNC: 81 MG/DL
TSH SERPL DL<=0.005 MIU/L-ACNC: 0.26 UIU/ML
WBC # BLD AUTO: 9 K/UL

## 2018-08-22 PROCEDURE — 80053 COMPREHEN METABOLIC PANEL: CPT

## 2018-08-22 PROCEDURE — 87338 HPYLORI STOOL AG IA: CPT

## 2018-08-22 PROCEDURE — 84443 ASSAY THYROID STIM HORMONE: CPT

## 2018-08-22 PROCEDURE — 84439 ASSAY OF FREE THYROXINE: CPT

## 2018-08-22 PROCEDURE — 36415 COLL VENOUS BLD VENIPUNCTURE: CPT

## 2018-08-22 PROCEDURE — 85025 COMPLETE CBC W/AUTO DIFF WBC: CPT

## 2018-08-22 PROCEDURE — 83036 HEMOGLOBIN GLYCOSYLATED A1C: CPT

## 2018-08-22 PROCEDURE — 80061 LIPID PANEL: CPT

## 2018-08-26 LAB — H PYLORI AG STL QL: NOT DETECTED

## 2018-08-27 ENCOUNTER — TELEPHONE (OUTPATIENT)
Dept: INTERNAL MEDICINE | Facility: CLINIC | Age: 28
End: 2018-08-27

## 2018-08-27 NOTE — TELEPHONE ENCOUNTER
Called and spoke with pt. Informed pt that pcp reviewed message, and reported that she can take omeprazole 40 mg BID x 14 days, then return to 40mg daily. Pt verbalized understanding.

## 2018-08-27 NOTE — TELEPHONE ENCOUNTER
Called and informed pt of h pylori results. Pt verbalized understanding. Pt instructed to inform pt that she has been taking Omeprazole 40mg BID since her insurance won't cover the Protonix. Informed pt that pcp will be notified.

## 2018-09-05 ENCOUNTER — HOSPITAL ENCOUNTER (OUTPATIENT)
Dept: SLEEP MEDICINE | Facility: HOSPITAL | Age: 28
Discharge: HOME | End: 2018-09-05
Attending: NURSE PRACTITIONER
Payer: MEDICAID

## 2018-09-05 DIAGNOSIS — G47.33 OBSTRUCTIVE SLEEP APNEA (ADULT) (PEDIATRIC): ICD-10-CM

## 2018-09-05 PROCEDURE — 95800 SLP STDY UNATTENDED: CPT | Mod: 26,,, | Performed by: INTERNAL MEDICINE

## 2018-09-05 PROCEDURE — 95806 SLEEP STUDY UNATT&RESP EFFT: CPT

## 2018-09-08 DIAGNOSIS — F41.9 ANXIETY AND DEPRESSION: ICD-10-CM

## 2018-09-08 DIAGNOSIS — F32.A ANXIETY AND DEPRESSION: ICD-10-CM

## 2018-09-10 RX ORDER — CITALOPRAM 40 MG/1
TABLET, FILM COATED ORAL
Qty: 30 TABLET | Refills: 0 | Status: SHIPPED | OUTPATIENT
Start: 2018-09-10 | End: 2018-09-18

## 2018-09-18 ENCOUNTER — OFFICE VISIT (OUTPATIENT)
Dept: INTERNAL MEDICINE | Facility: CLINIC | Age: 28
End: 2018-09-18
Payer: MEDICAID

## 2018-09-18 VITALS
DIASTOLIC BLOOD PRESSURE: 58 MMHG | SYSTOLIC BLOOD PRESSURE: 100 MMHG | HEIGHT: 62 IN | WEIGHT: 245.81 LBS | HEART RATE: 77 BPM | BODY MASS INDEX: 45.24 KG/M2 | OXYGEN SATURATION: 97 %

## 2018-09-18 DIAGNOSIS — F32.A DEPRESSION, UNSPECIFIED DEPRESSION TYPE: ICD-10-CM

## 2018-09-18 DIAGNOSIS — K21.9 GASTROESOPHAGEAL REFLUX DISEASE, ESOPHAGITIS PRESENCE NOT SPECIFIED: Primary | ICD-10-CM

## 2018-09-18 PROCEDURE — 99213 OFFICE O/P EST LOW 20 MIN: CPT | Mod: S$PBB,,, | Performed by: NURSE PRACTITIONER

## 2018-09-18 PROCEDURE — 99213 OFFICE O/P EST LOW 20 MIN: CPT | Mod: PBBFAC | Performed by: NURSE PRACTITIONER

## 2018-09-18 PROCEDURE — 99999 PR PBB SHADOW E&M-EST. PATIENT-LVL III: CPT | Mod: PBBFAC,,, | Performed by: NURSE PRACTITIONER

## 2018-09-18 RX ORDER — VILAZODONE HYDROCHLORIDE 20 MG/1
20 TABLET ORAL DAILY
Qty: 30 TABLET | Refills: 1 | Status: SHIPPED | OUTPATIENT
Start: 2018-09-18 | End: 2018-10-24 | Stop reason: SDUPTHER

## 2018-09-18 RX ORDER — OMEPRAZOLE 40 MG/1
40 CAPSULE, DELAYED RELEASE ORAL DAILY
Qty: 30 CAPSULE | Refills: 1 | Status: SHIPPED | OUTPATIENT
Start: 2018-09-18 | End: 2019-01-15

## 2018-09-18 NOTE — PROGRESS NOTES
Subjective:       Patient ID: Cortez Rollins is a 27 y.o. female.    Chief Complaint: Follow-up (test results)    HPI: Pt presents to clinic today known to me with c/o needing her f/u. She reports that she is here for test results. She also stopped her celexa. She started her boyfriends viibryd. She reports that she has been on it for 1 week. She feels better. She is having no side effects.     She has taken celexa, lexapro, wellbutrin, zoloft, and buspar used in past with no relief.     She uses buspar as needed and it helps with anxiety./   Review of Systems   Constitutional: Negative for chills, fatigue, fever and unexpected weight change.   HENT: Negative for congestion, ear pain, sore throat and trouble swallowing.    Eyes: Negative for pain and visual disturbance.   Respiratory: Negative for cough, chest tightness and shortness of breath.    Cardiovascular: Negative for chest pain, palpitations and leg swelling.   Gastrointestinal: Negative for abdominal distention, abdominal pain, constipation, diarrhea and vomiting.   Genitourinary: Negative for difficulty urinating, dysuria, flank pain, frequency and hematuria.   Musculoskeletal: Negative for back pain, gait problem, joint swelling, neck pain and neck stiffness.   Skin: Negative for rash and wound.   Neurological: Negative for dizziness, seizures, speech difficulty, light-headedness and headaches.       Objective:      Physical Exam   Constitutional: She is oriented to person, place, and time. She appears well-developed and well-nourished.   HENT:   Head: Normocephalic and atraumatic.   Eyes: Conjunctivae and EOM are normal. Pupils are equal, round, and reactive to light.   Neck: Normal range of motion. Neck supple.   Cardiovascular: Normal rate, regular rhythm, normal heart sounds and intact distal pulses.   No murmur heard.  Pulmonary/Chest: Effort normal and breath sounds normal. No stridor. No respiratory distress. She has no wheezes.   Abdominal: Soft.  Bowel sounds are normal. She exhibits no distension and no mass. There is no tenderness. There is no guarding.   Musculoskeletal: Normal range of motion. She exhibits no edema.   Neurological: She is alert and oriented to person, place, and time. No cranial nerve deficit.   Skin: Skin is warm and dry.   Psychiatric: She has a normal mood and affect. Her behavior is normal. Judgment and thought content normal.   Nursing note and vitals reviewed.      Assessment:       1. Gastroesophageal reflux disease, esophagitis presence not specified    2. Depression, unspecified depression type        Plan:     Problem List Items Addressed This Visit     None      Visit Diagnoses     Gastroesophageal reflux disease, esophagitis presence not specified    -  Primary    Relevant Medications    omeprazole (PRILOSEC) 40 MG capsule    Depression, unspecified depression type        Relevant Medications    vilazodone (VIIBRYD) 20 mg Tab        Had long discussion about weight. Discussed diet and exercise. Will f/u here 4 weeks.

## 2018-10-05 ENCOUNTER — PATIENT MESSAGE (OUTPATIENT)
Dept: INTERNAL MEDICINE | Facility: CLINIC | Age: 28
End: 2018-10-05

## 2018-10-24 ENCOUNTER — OFFICE VISIT (OUTPATIENT)
Dept: INTERNAL MEDICINE | Facility: CLINIC | Age: 28
End: 2018-10-24
Payer: MEDICAID

## 2018-10-24 VITALS
OXYGEN SATURATION: 97 % | DIASTOLIC BLOOD PRESSURE: 74 MMHG | HEART RATE: 90 BPM | RESPIRATION RATE: 18 BRPM | SYSTOLIC BLOOD PRESSURE: 106 MMHG | WEIGHT: 244.06 LBS | HEIGHT: 62 IN | BODY MASS INDEX: 44.91 KG/M2

## 2018-10-24 DIAGNOSIS — R30.0 DYSURIA: Primary | ICD-10-CM

## 2018-10-24 DIAGNOSIS — F32.A DEPRESSION, UNSPECIFIED DEPRESSION TYPE: ICD-10-CM

## 2018-10-24 LAB
BILIRUB SERPL-MCNC: ABNORMAL MG/DL
BLOOD URINE, POC: ABNORMAL
COLOR, POC UA: YELLOW
GLUCOSE UR QL STRIP: ABNORMAL
KETONES UR QL STRIP: ABNORMAL
LEUKOCYTE ESTERASE URINE, POC: ABNORMAL
NITRITE, POC UA: ABNORMAL
PH, POC UA: 9
PROTEIN, POC: ABNORMAL
SPECIFIC GRAVITY, POC UA: 1.01
UROBILINOGEN, POC UA: ABNORMAL

## 2018-10-24 PROCEDURE — 81002 URINALYSIS NONAUTO W/O SCOPE: CPT | Mod: PBBFAC | Performed by: NURSE PRACTITIONER

## 2018-10-24 PROCEDURE — 99999 PR PBB SHADOW E&M-EST. PATIENT-LVL III: CPT | Mod: PBBFAC,,, | Performed by: NURSE PRACTITIONER

## 2018-10-24 PROCEDURE — 99213 OFFICE O/P EST LOW 20 MIN: CPT | Mod: S$PBB,,, | Performed by: NURSE PRACTITIONER

## 2018-10-24 PROCEDURE — 99213 OFFICE O/P EST LOW 20 MIN: CPT | Mod: PBBFAC | Performed by: NURSE PRACTITIONER

## 2018-10-24 RX ORDER — VILAZODONE HYDROCHLORIDE 20 MG/1
20 TABLET ORAL DAILY
Qty: 30 TABLET | Refills: 5 | Status: SHIPPED | OUTPATIENT
Start: 2018-10-24 | End: 2019-07-06 | Stop reason: SDUPTHER

## 2018-10-24 NOTE — PROGRESS NOTES
Subjective:       Patient ID: Cortez Rollins is a 27 y.o. female.    Chief Complaint: Follow-up (5 week f/u) and Heartburn (daily)    HPI: Pt presents to clinic today known to me with c/o needing her f/u. She reports that she feels great on the viibryd. She is no longer fatigued. She reports that she is no longer aggravated. Sleeping better but still has days that she has trouble falling asleep.     She has taken celexa, lexapro, wellbutrin, zoloft, and buspar used in past with no relief.      Review of Systems   Constitutional: Negative for chills, fatigue, fever and unexpected weight change.   HENT: Negative for congestion, ear pain, sore throat and trouble swallowing.    Eyes: Negative for pain and visual disturbance.   Respiratory: Negative for cough, chest tightness and shortness of breath.    Cardiovascular: Negative for chest pain, palpitations and leg swelling.   Gastrointestinal: Negative for abdominal distention, abdominal pain, constipation, diarrhea and vomiting.   Genitourinary: Negative for difficulty urinating, dysuria, flank pain, frequency and hematuria.   Musculoskeletal: Negative for back pain, gait problem, joint swelling, neck pain and neck stiffness.   Skin: Negative for rash and wound.   Neurological: Negative for dizziness, seizures, speech difficulty, light-headedness and headaches.   Psychiatric/Behavioral: Negative for agitation, confusion, decreased concentration, self-injury and sleep disturbance. The patient is not nervous/anxious.        Objective:      Physical Exam   Constitutional: She is oriented to person, place, and time. She appears well-developed and well-nourished.   HENT:   Head: Normocephalic and atraumatic.   Right Ear: External ear normal.   Left Ear: External ear normal.   Nose: Nose normal.   Mouth/Throat: Oropharynx is clear and moist.   Eyes: Conjunctivae and EOM are normal. Pupils are equal, round, and reactive to light.   Neck: Normal range of motion. Neck supple.    Cardiovascular: Normal rate, regular rhythm, normal heart sounds and intact distal pulses.   Pulmonary/Chest: Effort normal and breath sounds normal.   Abdominal: Soft. Bowel sounds are normal.   Musculoskeletal: Normal range of motion.   Neurological: She is alert and oriented to person, place, and time.   Skin: Skin is warm and dry.   Psychiatric: She has a normal mood and affect. Her behavior is normal. Judgment and thought content normal.       Assessment:       1. Depression, unspecified depression type        Plan:     Problem List Items Addressed This Visit     None      Visit Diagnoses     Depression, unspecified depression type        Relevant Medications    vilazodone (VIIBRYD) 20 mg Tab      she needs prior auth on viibryd, failed meds as above will call [facility] for PA #    F/u 6 months

## 2018-12-14 DIAGNOSIS — F41.9 ANXIETY: ICD-10-CM

## 2018-12-19 RX ORDER — BUSPIRONE HYDROCHLORIDE 10 MG/1
10 TABLET ORAL 3 TIMES DAILY
Qty: 30 TABLET | Refills: 1 | Status: SHIPPED | OUTPATIENT
Start: 2018-12-19 | End: 2021-01-25

## 2019-01-15 ENCOUNTER — OFFICE VISIT (OUTPATIENT)
Dept: INTERNAL MEDICINE | Facility: CLINIC | Age: 29
End: 2019-01-15
Payer: COMMERCIAL

## 2019-01-15 ENCOUNTER — TELEPHONE (OUTPATIENT)
Dept: INTERNAL MEDICINE | Facility: CLINIC | Age: 29
End: 2019-01-15

## 2019-01-15 VITALS
BODY MASS INDEX: 45.8 KG/M2 | HEIGHT: 62 IN | OXYGEN SATURATION: 96 % | WEIGHT: 248.88 LBS | DIASTOLIC BLOOD PRESSURE: 60 MMHG | HEART RATE: 108 BPM | SYSTOLIC BLOOD PRESSURE: 100 MMHG | RESPIRATION RATE: 20 BRPM

## 2019-01-15 DIAGNOSIS — G47.00 INSOMNIA, UNSPECIFIED TYPE: Primary | ICD-10-CM

## 2019-01-15 DIAGNOSIS — G43.809 OTHER MIGRAINE WITHOUT STATUS MIGRAINOSUS, NOT INTRACTABLE: ICD-10-CM

## 2019-01-15 DIAGNOSIS — F41.9 ANXIETY: ICD-10-CM

## 2019-01-15 PROCEDURE — 3008F BODY MASS INDEX DOCD: CPT | Mod: CPTII,S$GLB,, | Performed by: NURSE PRACTITIONER

## 2019-01-15 PROCEDURE — 99213 OFFICE O/P EST LOW 20 MIN: CPT | Mod: S$GLB,,, | Performed by: NURSE PRACTITIONER

## 2019-01-15 PROCEDURE — 99999 PR PBB SHADOW E&M-EST. PATIENT-LVL III: CPT | Mod: PBBFAC,,, | Performed by: NURSE PRACTITIONER

## 2019-01-15 PROCEDURE — 99999 PR PBB SHADOW E&M-EST. PATIENT-LVL III: ICD-10-PCS | Mod: PBBFAC,,, | Performed by: NURSE PRACTITIONER

## 2019-01-15 PROCEDURE — 99213 PR OFFICE/OUTPT VISIT, EST, LEVL III, 20-29 MIN: ICD-10-PCS | Mod: S$GLB,,, | Performed by: NURSE PRACTITIONER

## 2019-01-15 PROCEDURE — 3008F PR BODY MASS INDEX (BMI) DOCUMENTED: ICD-10-PCS | Mod: CPTII,S$GLB,, | Performed by: NURSE PRACTITIONER

## 2019-01-15 RX ORDER — AMITRIPTYLINE HYDROCHLORIDE 10 MG/1
10 TABLET, FILM COATED ORAL NIGHTLY PRN
Qty: 30 TABLET | Refills: 1 | Status: SHIPPED | OUTPATIENT
Start: 2019-01-15 | End: 2019-01-29 | Stop reason: SDUPTHER

## 2019-01-15 NOTE — PROGRESS NOTES
Subjective:       Patient ID: Cortez Rollins is a 28 y.o. female.    Chief Complaint: Headache and Insomnia    HPI: Pt presents to clinic today known to me with c/o headaches and insomnia. She reports that she has tried benadryl and OTC meds for sleep but nothing helping. When she tried melatonin it gave her nightmares. She is not taking that but still having them. Her significant other is now working offshore and she stopped sleeping 4 days after he left. She has a hx of headaches but report that they have since worsened in last 2 weeks. She report that usually she can perform with them but 2 weeks ago had one so bad called in and had to shut off all lights. No nausea or vomiting. Sleep relieves them.   Review of Systems   Constitutional: Positive for activity change and unexpected weight change.   HENT: Negative for hearing loss, rhinorrhea and trouble swallowing.    Eyes: Negative for discharge and visual disturbance.   Respiratory: Negative for chest tightness and wheezing.    Cardiovascular: Negative for chest pain and palpitations.   Gastrointestinal: Negative for blood in stool, constipation, diarrhea and vomiting.   Endocrine: Negative for polydipsia and polyuria.   Genitourinary: Negative for difficulty urinating, dysuria, hematuria and menstrual problem.   Musculoskeletal: Negative for arthralgias, joint swelling and neck pain.   Neurological: Positive for headaches. Negative for weakness.   Psychiatric/Behavioral: Positive for dysphoric mood and sleep disturbance. Negative for confusion.       Objective:      Physical Exam   Constitutional: She is oriented to person, place, and time. She appears well-developed and well-nourished.   HENT:   Head: Normocephalic and atraumatic.   Right Ear: External ear normal.   Left Ear: External ear normal.   Nose: Nose normal.   Mouth/Throat: Oropharynx is clear and moist.   Eyes: Conjunctivae and EOM are normal. Pupils are equal, round, and reactive to light.   Neck: Normal  range of motion. Neck supple.   Cardiovascular: Normal rate, regular rhythm, normal heart sounds and intact distal pulses.   No murmur heard.  Pulmonary/Chest: Effort normal and breath sounds normal. No stridor. No respiratory distress. She has no wheezes.   Abdominal: Soft. Bowel sounds are normal.   Musculoskeletal: Normal range of motion.   Neurological: She is alert and oriented to person, place, and time.   Skin: Skin is warm and dry.   Psychiatric: She has a normal mood and affect. Her behavior is normal. Judgment and thought content normal.   Crying because she is afraid to be alone   Nursing note and vitals reviewed.      Assessment:       1. Insomnia, unspecified type    2. Anxiety    3. Other migraine without status migrainosus, not intractable        Plan:     Problem List Items Addressed This Visit     None      Visit Diagnoses     Insomnia, unspecified type    -  Primary    Relevant Medications    amitriptyline (ELAVIL) 10 MG tablet    Anxiety        Other migraine without status migrainosus, not intractable          will start with elavil 10mg nightly every day for now. If no relief after 2 weeks will call to increase to 125mg po if needed. Cont same other meds and treatment.

## 2019-01-15 NOTE — TELEPHONE ENCOUNTER
----- Message from Karina Barnard sent at 1/15/2019 11:00 AM CST -----  Contact: self   Cortez Rollins  MRN: [id number]  : 1990  PCP: Emilia Davis  Home Phone      [phone]  Work Phone      Not on file.  Mobile          [phone]    MESSAGE: Pt c/o severe headaches, and sleeping trouble. Pt declined appt on 19.    Phone # [phone]    [facility]/pharmacy #[id number] - [place] [address]

## 2019-01-29 ENCOUNTER — OFFICE VISIT (OUTPATIENT)
Dept: INTERNAL MEDICINE | Facility: CLINIC | Age: 29
End: 2019-01-29
Payer: COMMERCIAL

## 2019-01-29 ENCOUNTER — TELEPHONE (OUTPATIENT)
Dept: INTERNAL MEDICINE | Facility: CLINIC | Age: 29
End: 2019-01-29

## 2019-01-29 VITALS
SYSTOLIC BLOOD PRESSURE: 100 MMHG | OXYGEN SATURATION: 97 % | RESPIRATION RATE: 16 BRPM | DIASTOLIC BLOOD PRESSURE: 60 MMHG | WEIGHT: 246.94 LBS | HEART RATE: 92 BPM | HEIGHT: 62 IN | BODY MASS INDEX: 45.44 KG/M2

## 2019-01-29 DIAGNOSIS — G47.00 INSOMNIA, UNSPECIFIED TYPE: ICD-10-CM

## 2019-01-29 DIAGNOSIS — E66.01 MORBID OBESITY WITH BMI OF 40.0-44.9, ADULT: Primary | ICD-10-CM

## 2019-01-29 PROCEDURE — 99999 PR PBB SHADOW E&M-EST. PATIENT-LVL III: ICD-10-PCS | Mod: PBBFAC,,, | Performed by: NURSE PRACTITIONER

## 2019-01-29 PROCEDURE — 99213 OFFICE O/P EST LOW 20 MIN: CPT | Mod: S$GLB,,, | Performed by: NURSE PRACTITIONER

## 2019-01-29 PROCEDURE — 99213 PR OFFICE/OUTPT VISIT, EST, LEVL III, 20-29 MIN: ICD-10-PCS | Mod: S$GLB,,, | Performed by: NURSE PRACTITIONER

## 2019-01-29 PROCEDURE — 99999 PR PBB SHADOW E&M-EST. PATIENT-LVL III: CPT | Mod: PBBFAC,,, | Performed by: NURSE PRACTITIONER

## 2019-01-29 PROCEDURE — 3008F BODY MASS INDEX DOCD: CPT | Mod: CPTII,S$GLB,, | Performed by: NURSE PRACTITIONER

## 2019-01-29 PROCEDURE — 3008F PR BODY MASS INDEX (BMI) DOCUMENTED: ICD-10-PCS | Mod: CPTII,S$GLB,, | Performed by: NURSE PRACTITIONER

## 2019-01-29 RX ORDER — AMITRIPTYLINE HYDROCHLORIDE 10 MG/1
10 TABLET, FILM COATED ORAL NIGHTLY PRN
Qty: 90 TABLET | Refills: 1 | Status: SHIPPED | OUTPATIENT
Start: 2019-01-29 | End: 2019-04-29 | Stop reason: SDUPTHER

## 2019-01-29 NOTE — PROGRESS NOTES
Subjective:       Patient ID: Cortez Rollins is a 28 y.o. female.    Chief Complaint: Follow-up    HPI: Pt presents to clinic today known to me with c/o doing well. She report s that she is sleeping very well with the addition of elavil. Unable to fight off the sleep. Headache have also decreased in frequency as well as intensity. Has slight headache today but didn't get to bed till midnight.     She is concerned over wt today. She did not do well with wellbutrin in past. I would not add this to the elavil and viibryd anyway right now. Her bloos sugar has been elevated in the past so saxenda may be a good option for her.   Review of Systems   Constitutional: Negative for activity change and unexpected weight change.   HENT: Negative for hearing loss, rhinorrhea and trouble swallowing.    Eyes: Negative for discharge and visual disturbance.   Respiratory: Negative for chest tightness and wheezing.    Cardiovascular: Negative for chest pain and palpitations.   Gastrointestinal: Negative for blood in stool, constipation, diarrhea and vomiting.   Endocrine: Negative for polydipsia and polyuria.   Genitourinary: Negative for difficulty urinating, dysuria, hematuria and menstrual problem.   Musculoskeletal: Negative for arthralgias, joint swelling and neck pain.   Neurological: Positive for headaches. Negative for weakness.   Psychiatric/Behavioral: Positive for dysphoric mood. Negative for confusion, self-injury, sleep disturbance and suicidal ideas.       Objective:      Physical Exam   Constitutional: She is oriented to person, place, and time. She appears well-developed and well-nourished.   HENT:   Head: Normocephalic and atraumatic.   Right Ear: External ear normal.   Left Ear: External ear normal.   Nose: Nose normal.   Mouth/Throat: Oropharynx is clear and moist. No oropharyngeal exudate.   Eyes: Conjunctivae and EOM are normal. Pupils are equal, round, and reactive to light.   Neck: Normal range of motion. Neck  supple. No thyromegaly present.   Cardiovascular: Normal rate, regular rhythm, normal heart sounds and intact distal pulses.   No murmur heard.  Pulmonary/Chest: Effort normal and breath sounds normal. No stridor. No respiratory distress. She has no wheezes. She has no rales.   Abdominal: Soft. Bowel sounds are normal. She exhibits no distension and no mass. There is no tenderness. There is no guarding.   Musculoskeletal: Normal range of motion. She exhibits no edema.   Lymphadenopathy:     She has no cervical adenopathy.   Neurological: She is alert and oriented to person, place, and time.   Skin: Skin is warm and dry. No erythema.   Psychiatric: She has a normal mood and affect. Her behavior is normal. Judgment and thought content normal.   Nursing note and vitals reviewed.      Assessment:       1. Morbid obesity with BMI of 40.0-44.9, adult    2. Insomnia, unspecified type        Plan:     Problem List Items Addressed This Visit     None      Visit Diagnoses     Morbid obesity with BMI of 40.0-44.9, adult    -  Primary    Relevant Medications    liraglutide (SAXENDA) 3 mg/0.5 mL (18 mg/3 mL) PnIj    Insomnia, unspecified type        Relevant Medications    amitriptyline (ELAVIL) 10 MG tablet        Will check pricing on saxenda for her. Would not rec diet pills or contrave/belviq in this patient.   Cont low dose elavil for sleep and headaches. Cont viibryd for anxiety and depression 90 day supply [facility] pharmacy  F/u 3 months if start saxenda. 6 months for routine

## 2019-01-29 NOTE — TELEPHONE ENCOUNTER
----- Message from Gabriela Baird sent at 2019  3:20 PM CST -----  Contact: [facility] in [place]  Cortez Rollins  MRN: [id number]  : 1990  PCP: Emilia Davis  Home Phone      [phone]  Work Phone      Not on file.  Mobile          [phone]      MESSAGE:     Needs to get a prescription for needle tips for RX liraglutide (SAXENDA) 3 mg/0.5 mL (18 mg/3 mL) PnIj.  Please call.      Pharmacy: [facility] in [place]    Phone: [phone]

## 2019-01-29 NOTE — TELEPHONE ENCOUNTER
Pharmacist [facility] at [facility] Sexenda is covered with $20 copayment. [facility] Sexenda does not come with needles. Requesting order for SQ Yojana needle box of 100 with 0 refills. Order provided via v/o from Emilia Davis NP.

## 2019-02-12 ENCOUNTER — PATIENT MESSAGE (OUTPATIENT)
Dept: INTERNAL MEDICINE | Facility: CLINIC | Age: 29
End: 2019-02-12

## 2019-03-12 ENCOUNTER — TELEPHONE (OUTPATIENT)
Dept: INTERNAL MEDICINE | Facility: CLINIC | Age: 29
End: 2019-03-12

## 2019-03-12 NOTE — TELEPHONE ENCOUNTER
----- Message from Gabriela Baird sent at 3/12/2019 11:46 AM CDT -----  Contact: Corby Rollins  MRN: [id number]  : 1990  PCP: Emilia Davis  Home Phone      [phone]  Work Phone      Not on file.  Mobile          [phone]    MESSAGE:   Would like to speak to nurse about RX liraglutide (SAXENDA) 3 mg/0.5 mL (18 mg/3 mL) PnIj. She was put on this in January but she is still having problems with nausea and vomiting.   She thought that this would go away by now, but she is still having problems with it. Please call to advise.    Phone: [phone]

## 2019-03-12 NOTE — TELEPHONE ENCOUNTER
Patient states she is currently Saxenda 3mg, patient informed of recommendations. Patient verbalized understanding with no questions or concerns.

## 2019-03-12 NOTE — TELEPHONE ENCOUNTER
cassia find out if she went directly to the 3mg dose. She can cut the dose back to 0.6  to see if that helps

## 2019-04-27 ENCOUNTER — PATIENT MESSAGE (OUTPATIENT)
Dept: INTERNAL MEDICINE | Facility: CLINIC | Age: 29
End: 2019-04-27

## 2019-04-27 DIAGNOSIS — G47.00 INSOMNIA, UNSPECIFIED TYPE: ICD-10-CM

## 2019-04-29 ENCOUNTER — PATIENT MESSAGE (OUTPATIENT)
Dept: INTERNAL MEDICINE | Facility: CLINIC | Age: 29
End: 2019-04-29

## 2019-04-29 RX ORDER — AMITRIPTYLINE HYDROCHLORIDE 10 MG/1
10 TABLET, FILM COATED ORAL NIGHTLY PRN
Qty: 90 TABLET | Refills: 1 | Status: SHIPPED | OUTPATIENT
Start: 2019-04-29 | End: 2021-01-25

## 2019-05-15 ENCOUNTER — PATIENT MESSAGE (OUTPATIENT)
Dept: INTERNAL MEDICINE | Facility: CLINIC | Age: 29
End: 2019-05-15

## 2019-05-29 ENCOUNTER — PATIENT MESSAGE (OUTPATIENT)
Dept: INTERNAL MEDICINE | Facility: CLINIC | Age: 29
End: 2019-05-29

## 2019-07-06 DIAGNOSIS — F32.A DEPRESSION, UNSPECIFIED DEPRESSION TYPE: ICD-10-CM

## 2019-07-08 RX ORDER — VILAZODONE HYDROCHLORIDE 20 MG/1
TABLET ORAL
Qty: 90 TABLET | Refills: 1 | Status: SHIPPED | OUTPATIENT
Start: 2019-07-08 | End: 2021-01-25 | Stop reason: SDUPTHER

## 2020-03-31 ENCOUNTER — PATIENT OUTREACH (OUTPATIENT)
Dept: ADMINISTRATIVE | Facility: OTHER | Age: 30
End: 2020-03-31

## 2020-10-05 ENCOUNTER — PATIENT MESSAGE (OUTPATIENT)
Dept: ADMINISTRATIVE | Facility: HOSPITAL | Age: 30
End: 2020-10-05

## 2021-01-19 ENCOUNTER — TELEPHONE (OUTPATIENT)
Dept: INTERNAL MEDICINE | Facility: CLINIC | Age: 31
End: 2021-01-19

## 2021-01-19 DIAGNOSIS — G47.33 OBSTRUCTIVE SLEEP APNEA (ADULT) (PEDIATRIC): ICD-10-CM

## 2021-01-19 DIAGNOSIS — Z00.00 WELLNESS EXAMINATION: Primary | ICD-10-CM

## 2021-01-22 ENCOUNTER — LAB VISIT (OUTPATIENT)
Dept: LAB | Facility: HOSPITAL | Age: 31
End: 2021-01-22
Attending: NURSE PRACTITIONER
Payer: MEDICAID

## 2021-01-22 DIAGNOSIS — G47.33 OBSTRUCTIVE SLEEP APNEA (ADULT) (PEDIATRIC): ICD-10-CM

## 2021-01-22 DIAGNOSIS — Z00.00 WELLNESS EXAMINATION: ICD-10-CM

## 2021-01-22 LAB
ALBUMIN SERPL BCP-MCNC: 3.5 G/DL (ref 3.5–5.2)
ALP SERPL-CCNC: 57 U/L (ref 55–135)
ALT SERPL W/O P-5'-P-CCNC: 16 U/L (ref 10–44)
ANION GAP SERPL CALC-SCNC: 9 MMOL/L (ref 8–16)
AST SERPL-CCNC: 12 U/L (ref 10–40)
BASOPHILS # BLD AUTO: 0.06 K/UL (ref 0–0.2)
BASOPHILS NFR BLD: 0.6 % (ref 0–1.9)
BILIRUB SERPL-MCNC: 0.3 MG/DL (ref 0.1–1)
BUN SERPL-MCNC: 9 MG/DL (ref 6–20)
CALCIUM SERPL-MCNC: 8.8 MG/DL (ref 8.7–10.5)
CHLORIDE SERPL-SCNC: 107 MMOL/L (ref 95–110)
CHOLEST SERPL-MCNC: 173 MG/DL (ref 120–199)
CHOLEST/HDLC SERPL: 5.1 {RATIO} (ref 2–5)
CO2 SERPL-SCNC: 24 MMOL/L (ref 23–29)
CREAT SERPL-MCNC: 0.7 MG/DL (ref 0.5–1.4)
DIFFERENTIAL METHOD: NORMAL
EOSINOPHIL # BLD AUTO: 0.1 K/UL (ref 0–0.5)
EOSINOPHIL NFR BLD: 1.3 % (ref 0–8)
ERYTHROCYTE [DISTWIDTH] IN BLOOD BY AUTOMATED COUNT: 13.3 % (ref 11.5–14.5)
EST. GFR  (AFRICAN AMERICAN): >60 ML/MIN/1.73 M^2
EST. GFR  (NON AFRICAN AMERICAN): >60 ML/MIN/1.73 M^2
GLUCOSE SERPL-MCNC: 93 MG/DL (ref 70–110)
HCT VFR BLD AUTO: 43.2 % (ref 37–48.5)
HDLC SERPL-MCNC: 34 MG/DL (ref 40–75)
HDLC SERPL: 19.7 % (ref 20–50)
HGB BLD-MCNC: 14.5 G/DL (ref 12–16)
IMM GRANULOCYTES # BLD AUTO: 0.04 K/UL (ref 0–0.04)
IMM GRANULOCYTES NFR BLD AUTO: 0.4 % (ref 0–0.5)
LDLC SERPL CALC-MCNC: 108.2 MG/DL (ref 63–159)
LYMPHOCYTES # BLD AUTO: 3 K/UL (ref 1–4.8)
LYMPHOCYTES NFR BLD: 28.4 % (ref 18–48)
MCH RBC QN AUTO: 29.8 PG (ref 27–31)
MCHC RBC AUTO-ENTMCNC: 33.6 G/DL (ref 32–36)
MCV RBC AUTO: 89 FL (ref 82–98)
MONOCYTES # BLD AUTO: 0.9 K/UL (ref 0.3–1)
MONOCYTES NFR BLD: 8.1 % (ref 4–15)
NEUTROPHILS # BLD AUTO: 6.5 K/UL (ref 1.8–7.7)
NEUTROPHILS NFR BLD: 61.2 % (ref 38–73)
NONHDLC SERPL-MCNC: 139 MG/DL
NRBC BLD-RTO: 0 /100 WBC
PLATELET # BLD AUTO: 223 K/UL (ref 150–350)
PMV BLD AUTO: 11 FL (ref 9.2–12.9)
POTASSIUM SERPL-SCNC: 4.1 MMOL/L (ref 3.5–5.1)
PROT SERPL-MCNC: 6.4 G/DL (ref 6–8.4)
RBC # BLD AUTO: 4.87 M/UL (ref 4–5.4)
SODIUM SERPL-SCNC: 140 MMOL/L (ref 136–145)
TRIGL SERPL-MCNC: 154 MG/DL (ref 30–150)
TSH SERPL DL<=0.005 MIU/L-ACNC: 0.41 UIU/ML (ref 0.4–4)
WBC # BLD AUTO: 10.69 K/UL (ref 3.9–12.7)

## 2021-01-22 PROCEDURE — 80061 LIPID PANEL: CPT

## 2021-01-22 PROCEDURE — 80053 COMPREHEN METABOLIC PANEL: CPT

## 2021-01-22 PROCEDURE — 85025 COMPLETE CBC W/AUTO DIFF WBC: CPT

## 2021-01-22 PROCEDURE — 84443 ASSAY THYROID STIM HORMONE: CPT

## 2021-01-22 PROCEDURE — 36415 COLL VENOUS BLD VENIPUNCTURE: CPT

## 2021-01-25 ENCOUNTER — OFFICE VISIT (OUTPATIENT)
Dept: INTERNAL MEDICINE | Facility: CLINIC | Age: 31
End: 2021-01-25
Payer: MEDICAID

## 2021-01-25 VITALS
RESPIRATION RATE: 16 BRPM | TEMPERATURE: 97 F | DIASTOLIC BLOOD PRESSURE: 68 MMHG | HEIGHT: 62 IN | SYSTOLIC BLOOD PRESSURE: 104 MMHG | BODY MASS INDEX: 46.29 KG/M2 | OXYGEN SATURATION: 96 % | HEART RATE: 100 BPM | WEIGHT: 251.56 LBS

## 2021-01-25 DIAGNOSIS — K21.9 GASTROESOPHAGEAL REFLUX DISEASE WITHOUT ESOPHAGITIS: Primary | ICD-10-CM

## 2021-01-25 DIAGNOSIS — Z30.9 ENCOUNTER FOR CONTRACEPTIVE MANAGEMENT, UNSPECIFIED TYPE: ICD-10-CM

## 2021-01-25 DIAGNOSIS — F32.A DEPRESSION, UNSPECIFIED DEPRESSION TYPE: ICD-10-CM

## 2021-01-25 PROCEDURE — 99214 PR OFFICE/OUTPT VISIT, EST, LEVL IV, 30-39 MIN: ICD-10-PCS | Mod: S$PBB,,, | Performed by: NURSE PRACTITIONER

## 2021-01-25 PROCEDURE — 99214 OFFICE O/P EST MOD 30 MIN: CPT | Mod: S$PBB,,, | Performed by: NURSE PRACTITIONER

## 2021-01-25 PROCEDURE — 99999 PR PBB SHADOW E&M-EST. PATIENT-LVL IV: ICD-10-PCS | Mod: PBBFAC,,, | Performed by: NURSE PRACTITIONER

## 2021-01-25 PROCEDURE — 99999 PR PBB SHADOW E&M-EST. PATIENT-LVL IV: CPT | Mod: PBBFAC,,, | Performed by: NURSE PRACTITIONER

## 2021-01-25 PROCEDURE — 99214 OFFICE O/P EST MOD 30 MIN: CPT | Mod: PBBFAC | Performed by: NURSE PRACTITIONER

## 2021-01-25 RX ORDER — VILAZODONE HYDROCHLORIDE 20 MG/1
1 TABLET ORAL DAILY
Qty: 30 TABLET | Refills: 1 | Status: SHIPPED | OUTPATIENT
Start: 2021-01-25 | End: 2021-03-08

## 2021-01-25 RX ORDER — PANTOPRAZOLE SODIUM 40 MG/1
40 TABLET, DELAYED RELEASE ORAL DAILY
Qty: 30 TABLET | Refills: 1 | Status: SHIPPED | OUTPATIENT
Start: 2021-01-25 | End: 2021-03-23

## 2021-01-26 ENCOUNTER — TELEPHONE (OUTPATIENT)
Dept: INTERNAL MEDICINE | Facility: CLINIC | Age: 31
End: 2021-01-26

## 2021-03-08 ENCOUNTER — OFFICE VISIT (OUTPATIENT)
Dept: INTERNAL MEDICINE | Facility: CLINIC | Age: 31
End: 2021-03-08
Payer: MEDICAID

## 2021-03-08 VITALS
OXYGEN SATURATION: 95 % | WEIGHT: 253.75 LBS | DIASTOLIC BLOOD PRESSURE: 60 MMHG | BODY MASS INDEX: 46.7 KG/M2 | HEART RATE: 91 BPM | SYSTOLIC BLOOD PRESSURE: 94 MMHG | TEMPERATURE: 98 F | RESPIRATION RATE: 18 BRPM | HEIGHT: 62 IN

## 2021-03-08 DIAGNOSIS — F32.A DEPRESSION, UNSPECIFIED DEPRESSION TYPE: ICD-10-CM

## 2021-03-08 PROCEDURE — 99999 PR PBB SHADOW E&M-EST. PATIENT-LVL III: CPT | Mod: PBBFAC,,, | Performed by: NURSE PRACTITIONER

## 2021-03-08 PROCEDURE — 99214 OFFICE O/P EST MOD 30 MIN: CPT | Mod: S$PBB,,, | Performed by: NURSE PRACTITIONER

## 2021-03-08 PROCEDURE — 99999 PR PBB SHADOW E&M-EST. PATIENT-LVL III: ICD-10-PCS | Mod: PBBFAC,,, | Performed by: NURSE PRACTITIONER

## 2021-03-08 PROCEDURE — 99214 PR OFFICE/OUTPT VISIT, EST, LEVL IV, 30-39 MIN: ICD-10-PCS | Mod: S$PBB,,, | Performed by: NURSE PRACTITIONER

## 2021-03-08 PROCEDURE — 99213 OFFICE O/P EST LOW 20 MIN: CPT | Mod: PBBFAC | Performed by: NURSE PRACTITIONER

## 2021-03-08 RX ORDER — VILAZODONE HYDROCHLORIDE 40 MG/1
40 TABLET ORAL DAILY
Qty: 30 TABLET | Refills: 11 | Status: SHIPPED | OUTPATIENT
Start: 2021-03-08 | End: 2021-11-08

## 2021-03-08 RX ORDER — HYDROXYZINE PAMOATE 25 MG/1
CAPSULE ORAL
Qty: 60 CAPSULE | Refills: 0 | Status: SHIPPED | OUTPATIENT
Start: 2021-03-08 | End: 2021-04-05

## 2021-03-08 RX ORDER — BUSPIRONE HYDROCHLORIDE 10 MG/1
TABLET ORAL
Qty: 90 TABLET | Refills: 1 | Status: SHIPPED | OUTPATIENT
Start: 2021-03-08 | End: 2021-04-26

## 2021-03-11 ENCOUNTER — PATIENT MESSAGE (OUTPATIENT)
Dept: INTERNAL MEDICINE | Facility: CLINIC | Age: 31
End: 2021-03-11

## 2021-03-28 ENCOUNTER — PATIENT MESSAGE (OUTPATIENT)
Dept: INTERNAL MEDICINE | Facility: CLINIC | Age: 31
End: 2021-03-28

## 2021-03-29 ENCOUNTER — OFFICE VISIT (OUTPATIENT)
Dept: URGENT CARE | Facility: CLINIC | Age: 31
End: 2021-03-29
Payer: MEDICAID

## 2021-03-29 VITALS
TEMPERATURE: 96 F | HEIGHT: 62 IN | HEART RATE: 87 BPM | RESPIRATION RATE: 20 BRPM | BODY MASS INDEX: 46.74 KG/M2 | OXYGEN SATURATION: 97 % | DIASTOLIC BLOOD PRESSURE: 64 MMHG | WEIGHT: 254 LBS | SYSTOLIC BLOOD PRESSURE: 108 MMHG

## 2021-03-29 DIAGNOSIS — B96.89 ACUTE BACTERIAL BRONCHITIS: Primary | ICD-10-CM

## 2021-03-29 DIAGNOSIS — J20.8 ACUTE BACTERIAL BRONCHITIS: Primary | ICD-10-CM

## 2021-03-29 DIAGNOSIS — Z72.0 TOBACCO ABUSE: ICD-10-CM

## 2021-03-29 DIAGNOSIS — R07.89 COSTOCHONDRAL CHEST PAIN: ICD-10-CM

## 2021-03-29 DIAGNOSIS — R09.89 CHEST CONGESTION: ICD-10-CM

## 2021-03-29 DIAGNOSIS — J01.10 ACUTE FRONTAL SINUSITIS, RECURRENCE NOT SPECIFIED: ICD-10-CM

## 2021-03-29 LAB
CTP QC/QA: YES
SARS-COV-2 RDRP RESP QL NAA+PROBE: NEGATIVE

## 2021-03-29 PROCEDURE — U0002: ICD-10-PCS | Mod: QW,S$GLB,, | Performed by: NURSE PRACTITIONER

## 2021-03-29 PROCEDURE — 71046 X-RAY EXAM CHEST 2 VIEWS: CPT | Mod: S$GLB,,, | Performed by: RADIOLOGY

## 2021-03-29 PROCEDURE — 99214 OFFICE O/P EST MOD 30 MIN: CPT | Mod: S$GLB,,, | Performed by: NURSE PRACTITIONER

## 2021-03-29 PROCEDURE — 99214 PR OFFICE/OUTPT VISIT, EST, LEVL IV, 30-39 MIN: ICD-10-PCS | Mod: S$GLB,,, | Performed by: NURSE PRACTITIONER

## 2021-03-29 PROCEDURE — 71046 XR CHEST PA AND LATERAL: ICD-10-PCS | Mod: S$GLB,,, | Performed by: RADIOLOGY

## 2021-03-29 PROCEDURE — U0002 COVID-19 LAB TEST NON-CDC: HCPCS | Mod: QW,S$GLB,, | Performed by: NURSE PRACTITIONER

## 2021-03-29 RX ORDER — PREDNISONE 10 MG/1
10 TABLET ORAL DAILY
Qty: 4 TABLET | Refills: 0 | Status: SHIPPED | OUTPATIENT
Start: 2021-03-29 | End: 2021-04-02

## 2021-03-29 RX ORDER — DOXYCYCLINE 100 MG/1
100 CAPSULE ORAL EVERY 12 HOURS
Qty: 20 CAPSULE | Refills: 0 | Status: SHIPPED | OUTPATIENT
Start: 2021-03-29 | End: 2021-04-08

## 2021-04-05 ENCOUNTER — OFFICE VISIT (OUTPATIENT)
Dept: INTERNAL MEDICINE | Facility: CLINIC | Age: 31
End: 2021-04-05
Payer: MEDICAID

## 2021-04-05 VITALS
RESPIRATION RATE: 16 BRPM | SYSTOLIC BLOOD PRESSURE: 108 MMHG | WEIGHT: 257.5 LBS | HEIGHT: 62 IN | DIASTOLIC BLOOD PRESSURE: 72 MMHG | OXYGEN SATURATION: 95 % | HEART RATE: 103 BPM | BODY MASS INDEX: 47.39 KG/M2

## 2021-04-05 DIAGNOSIS — M94.0 COSTAL CHONDRITIS: Primary | ICD-10-CM

## 2021-04-05 PROCEDURE — 99213 OFFICE O/P EST LOW 20 MIN: CPT | Mod: PBBFAC,25 | Performed by: NURSE PRACTITIONER

## 2021-04-05 PROCEDURE — 99213 PR OFFICE/OUTPT VISIT, EST, LEVL III, 20-29 MIN: ICD-10-PCS | Mod: S$PBB,,, | Performed by: NURSE PRACTITIONER

## 2021-04-05 PROCEDURE — 96372 THER/PROPH/DIAG INJ SC/IM: CPT | Mod: PBBFAC

## 2021-04-05 PROCEDURE — 99999 PR PBB SHADOW E&M-EST. PATIENT-LVL III: CPT | Mod: PBBFAC,,, | Performed by: NURSE PRACTITIONER

## 2021-04-05 PROCEDURE — 99213 OFFICE O/P EST LOW 20 MIN: CPT | Mod: S$PBB,,, | Performed by: NURSE PRACTITIONER

## 2021-04-05 PROCEDURE — 99999 PR PBB SHADOW E&M-EST. PATIENT-LVL III: ICD-10-PCS | Mod: PBBFAC,,, | Performed by: NURSE PRACTITIONER

## 2021-04-05 RX ORDER — METHYLPREDNISOLONE ACETATE 40 MG/ML
40 INJECTION, SUSPENSION INTRA-ARTICULAR; INTRALESIONAL; INTRAMUSCULAR; SOFT TISSUE
Status: COMPLETED | OUTPATIENT
Start: 2021-04-05 | End: 2021-04-05

## 2021-04-05 RX ORDER — IBUPROFEN 800 MG/1
800 TABLET ORAL 3 TIMES DAILY PRN
Qty: 30 TABLET | Refills: 0 | Status: SHIPPED | OUTPATIENT
Start: 2021-04-06 | End: 2021-11-08

## 2021-04-05 RX ORDER — KETOROLAC TROMETHAMINE 30 MG/ML
30 INJECTION, SOLUTION INTRAMUSCULAR; INTRAVENOUS
Status: COMPLETED | OUTPATIENT
Start: 2021-04-05 | End: 2021-04-05

## 2021-04-05 RX ORDER — CYCLOBENZAPRINE HCL 5 MG
5 TABLET ORAL 3 TIMES DAILY PRN
Qty: 15 TABLET | Refills: 0 | Status: SHIPPED | OUTPATIENT
Start: 2021-04-05 | End: 2021-04-15

## 2021-04-05 RX ADMIN — KETOROLAC TROMETHAMINE 30 MG: 30 INJECTION, SOLUTION INTRAMUSCULAR; INTRAVENOUS at 02:04

## 2021-04-05 RX ADMIN — METHYLPREDNISOLONE ACETATE 40 MG: 40 INJECTION, SUSPENSION INTRA-ARTICULAR; INTRALESIONAL; INTRAMUSCULAR; SOFT TISSUE at 02:04

## 2021-04-26 ENCOUNTER — TELEPHONE (OUTPATIENT)
Dept: INTERNAL MEDICINE | Facility: CLINIC | Age: 31
End: 2021-04-26

## 2021-04-26 DIAGNOSIS — F32.A DEPRESSION, UNSPECIFIED DEPRESSION TYPE: ICD-10-CM

## 2021-04-26 RX ORDER — BUSPIRONE HYDROCHLORIDE 10 MG/1
10 TABLET ORAL 3 TIMES DAILY PRN
Qty: 90 TABLET | Refills: 1 | Status: SHIPPED | OUTPATIENT
Start: 2021-04-26 | End: 2021-07-06

## 2021-05-27 DIAGNOSIS — K21.9 GASTROESOPHAGEAL REFLUX DISEASE WITHOUT ESOPHAGITIS: ICD-10-CM

## 2021-05-31 RX ORDER — PANTOPRAZOLE SODIUM 40 MG/1
TABLET, DELAYED RELEASE ORAL
Qty: 30 TABLET | Refills: 0 | Status: SHIPPED | OUTPATIENT
Start: 2021-05-31 | End: 2021-07-06

## 2021-07-04 ENCOUNTER — PATIENT MESSAGE (OUTPATIENT)
Dept: INTERNAL MEDICINE | Facility: CLINIC | Age: 31
End: 2021-07-04

## 2021-07-06 ENCOUNTER — TELEPHONE (OUTPATIENT)
Dept: INTERNAL MEDICINE | Facility: CLINIC | Age: 31
End: 2021-07-06

## 2021-07-07 ENCOUNTER — TELEPHONE (OUTPATIENT)
Dept: INTERNAL MEDICINE | Facility: CLINIC | Age: 31
End: 2021-07-07

## 2021-07-07 DIAGNOSIS — F32.A DEPRESSION, UNSPECIFIED DEPRESSION TYPE: ICD-10-CM

## 2021-07-07 RX ORDER — BUSPIRONE HYDROCHLORIDE 10 MG/1
10 TABLET ORAL 2 TIMES DAILY
Qty: 60 TABLET | Refills: 0 | Status: SHIPPED | OUTPATIENT
Start: 2021-07-07 | End: 2021-09-12 | Stop reason: SDUPTHER

## 2021-07-25 ENCOUNTER — HOSPITAL ENCOUNTER (EMERGENCY)
Facility: HOSPITAL | Age: 31
Discharge: HOME | End: 2021-07-25
Attending: SURGERY
Payer: MEDICAID

## 2021-07-25 ENCOUNTER — NURSE TRIAGE (OUTPATIENT)
Dept: ADMINISTRATIVE | Facility: CLINIC | Age: 31
End: 2021-07-25

## 2021-07-25 VITALS
WEIGHT: 259.94 LBS | RESPIRATION RATE: 18 BRPM | OXYGEN SATURATION: 96 % | BODY MASS INDEX: 47.84 KG/M2 | TEMPERATURE: 98 F | HEIGHT: 62 IN | DIASTOLIC BLOOD PRESSURE: 68 MMHG | HEART RATE: 89 BPM | SYSTOLIC BLOOD PRESSURE: 130 MMHG

## 2021-07-25 DIAGNOSIS — L73.9 FOLLICULITIS: Primary | ICD-10-CM

## 2021-07-25 PROCEDURE — 99284 EMERGENCY DEPT VISIT MOD MDM: CPT | Mod: 25

## 2021-07-25 PROCEDURE — 96372 THER/PROPH/DIAG INJ SC/IM: CPT | Mod: 59

## 2021-07-25 PROCEDURE — 25000003 PHARM REV CODE 250: Performed by: SURGERY

## 2021-07-25 RX ORDER — MUPIROCIN 20 MG/G
OINTMENT TOPICAL 3 TIMES DAILY
Qty: 15 G | Refills: 0 | Status: SHIPPED | OUTPATIENT
Start: 2021-07-25 | End: 2021-08-04

## 2021-07-25 RX ORDER — CLINDAMYCIN HYDROCHLORIDE 300 MG/1
300 CAPSULE ORAL 4 TIMES DAILY
Qty: 28 CAPSULE | Refills: 0 | Status: SHIPPED | OUTPATIENT
Start: 2021-07-25 | End: 2021-08-01

## 2021-07-25 RX ORDER — CLINDAMYCIN PHOSPHATE 150 MG/ML
600 INJECTION, SOLUTION INTRAVENOUS
Status: COMPLETED | OUTPATIENT
Start: 2021-07-25 | End: 2021-07-25

## 2021-07-25 RX ADMIN — CLINDAMYCIN PHOSPHATE 600 MG: 150 INJECTION, SOLUTION INTRAMUSCULAR; INTRAVENOUS at 10:07

## 2021-07-26 ENCOUNTER — PATIENT OUTREACH (OUTPATIENT)
Dept: ADMINISTRATIVE | Facility: HOSPITAL | Age: 31
End: 2021-07-26

## 2021-07-30 ENCOUNTER — PATIENT MESSAGE (OUTPATIENT)
Dept: INTERNAL MEDICINE | Facility: CLINIC | Age: 31
End: 2021-07-30

## 2021-07-30 DIAGNOSIS — U07.1 COVID-19: Primary | ICD-10-CM

## 2021-07-31 ENCOUNTER — NURSE TRIAGE (OUTPATIENT)
Dept: ADMINISTRATIVE | Facility: CLINIC | Age: 31
End: 2021-07-31

## 2021-08-10 ENCOUNTER — TELEPHONE (OUTPATIENT)
Dept: INTERNAL MEDICINE | Facility: CLINIC | Age: 31
End: 2021-08-10

## 2021-08-10 DIAGNOSIS — K21.9 GASTROESOPHAGEAL REFLUX DISEASE WITHOUT ESOPHAGITIS: ICD-10-CM

## 2021-08-10 RX ORDER — PANTOPRAZOLE SODIUM 40 MG/1
40 TABLET, DELAYED RELEASE ORAL DAILY
Qty: 30 TABLET | Refills: 0 | Status: SHIPPED | OUTPATIENT
Start: 2021-08-10 | End: 2021-09-12 | Stop reason: SDUPTHER

## 2021-09-13 DIAGNOSIS — K21.9 GASTROESOPHAGEAL REFLUX DISEASE WITHOUT ESOPHAGITIS: ICD-10-CM

## 2021-09-13 RX ORDER — PANTOPRAZOLE SODIUM 40 MG/1
40 TABLET, DELAYED RELEASE ORAL DAILY
Qty: 90 TABLET | Refills: 1 | Status: SHIPPED | OUTPATIENT
Start: 2021-09-13 | End: 2021-12-30 | Stop reason: SDUPTHER

## 2021-10-23 ENCOUNTER — PATIENT OUTREACH (OUTPATIENT)
Dept: ADMINISTRATIVE | Facility: HOSPITAL | Age: 31
End: 2021-10-23
Payer: MEDICAID

## 2021-11-08 ENCOUNTER — PATIENT OUTREACH (OUTPATIENT)
Dept: FAMILY MEDICINE | Facility: CLINIC | Age: 31
End: 2021-11-08
Payer: MEDICAID

## 2021-11-08 ENCOUNTER — OFFICE VISIT (OUTPATIENT)
Dept: INTERNAL MEDICINE | Facility: CLINIC | Age: 31
End: 2021-11-08
Payer: MEDICAID

## 2021-11-08 VITALS
HEART RATE: 64 BPM | SYSTOLIC BLOOD PRESSURE: 110 MMHG | RESPIRATION RATE: 16 BRPM | HEIGHT: 62 IN | DIASTOLIC BLOOD PRESSURE: 68 MMHG | WEIGHT: 256.19 LBS | BODY MASS INDEX: 47.15 KG/M2 | OXYGEN SATURATION: 95 %

## 2021-11-08 DIAGNOSIS — F32.A DEPRESSION, UNSPECIFIED DEPRESSION TYPE: ICD-10-CM

## 2021-11-08 PROCEDURE — 99213 PR OFFICE/OUTPT VISIT, EST, LEVL III, 20-29 MIN: ICD-10-PCS | Mod: S$PBB,,, | Performed by: NURSE PRACTITIONER

## 2021-11-08 PROCEDURE — 99213 OFFICE O/P EST LOW 20 MIN: CPT | Mod: PBBFAC | Performed by: NURSE PRACTITIONER

## 2021-11-08 PROCEDURE — 99213 OFFICE O/P EST LOW 20 MIN: CPT | Mod: S$PBB,,, | Performed by: NURSE PRACTITIONER

## 2021-11-08 PROCEDURE — 99999 PR PBB SHADOW E&M-EST. PATIENT-LVL III: CPT | Mod: PBBFAC,,, | Performed by: NURSE PRACTITIONER

## 2021-11-08 PROCEDURE — 99999 PR PBB SHADOW E&M-EST. PATIENT-LVL III: ICD-10-PCS | Mod: PBBFAC,,, | Performed by: NURSE PRACTITIONER

## 2021-11-08 RX ORDER — VILAZODONE HYDROCHLORIDE 20 MG/1
20 TABLET ORAL DAILY
Qty: 30 TABLET | Refills: 1 | Status: SHIPPED | OUTPATIENT
Start: 2021-11-08 | End: 2022-03-28

## 2021-11-08 RX ORDER — LORATADINE 10 MG/1
10 TABLET ORAL DAILY
COMMUNITY
Start: 2021-08-03 | End: 2023-09-15

## 2021-11-08 RX ORDER — MIRTAZAPINE 15 MG/1
15 TABLET, FILM COATED ORAL NIGHTLY
Qty: 30 TABLET | Refills: 0 | Status: SHIPPED | OUTPATIENT
Start: 2021-11-08 | End: 2022-03-28

## 2021-11-12 ENCOUNTER — PATIENT MESSAGE (OUTPATIENT)
Dept: INTERNAL MEDICINE | Facility: CLINIC | Age: 31
End: 2021-11-12
Payer: MEDICAID

## 2021-11-15 ENCOUNTER — OFFICE VISIT (OUTPATIENT)
Dept: INTERNAL MEDICINE | Facility: CLINIC | Age: 31
End: 2021-11-15
Payer: MEDICAID

## 2021-11-15 DIAGNOSIS — F41.1 GAD (GENERALIZED ANXIETY DISORDER): Primary | ICD-10-CM

## 2021-11-15 PROCEDURE — 99213 OFFICE O/P EST LOW 20 MIN: CPT | Mod: 95,,, | Performed by: NURSE PRACTITIONER

## 2021-11-15 PROCEDURE — 99213 PR OFFICE/OUTPT VISIT, EST, LEVL III, 20-29 MIN: ICD-10-PCS | Mod: 95,,, | Performed by: NURSE PRACTITIONER

## 2021-12-13 ENCOUNTER — PATIENT MESSAGE (OUTPATIENT)
Dept: INTERNAL MEDICINE | Facility: CLINIC | Age: 31
End: 2021-12-13
Payer: MEDICAID

## 2021-12-13 DIAGNOSIS — N39.0 URINARY TRACT INFECTION WITHOUT HEMATURIA, SITE UNSPECIFIED: Primary | ICD-10-CM

## 2021-12-14 RX ORDER — CIPROFLOXACIN 500 MG/1
500 TABLET ORAL EVERY 12 HOURS
Qty: 14 TABLET | Refills: 0 | Status: SHIPPED | OUTPATIENT
Start: 2021-12-14 | End: 2022-03-28

## 2021-12-30 DIAGNOSIS — K21.9 GASTROESOPHAGEAL REFLUX DISEASE WITHOUT ESOPHAGITIS: ICD-10-CM

## 2021-12-30 RX ORDER — PANTOPRAZOLE SODIUM 40 MG/1
40 TABLET, DELAYED RELEASE ORAL DAILY
Qty: 90 TABLET | Refills: 0 | Status: SHIPPED | OUTPATIENT
Start: 2021-12-30 | End: 2022-03-28

## 2022-01-04 ENCOUNTER — HOSPITAL ENCOUNTER (EMERGENCY)
Facility: HOSPITAL | Age: 32
Discharge: HOME | End: 2022-01-04
Attending: STUDENT IN AN ORGANIZED HEALTH CARE EDUCATION/TRAINING PROGRAM
Payer: MEDICAID

## 2022-01-04 ENCOUNTER — PATIENT MESSAGE (OUTPATIENT)
Dept: ADMINISTRATIVE | Facility: OTHER | Age: 32
End: 2022-01-04
Payer: MEDICAID

## 2022-01-04 VITALS
TEMPERATURE: 98 F | DIASTOLIC BLOOD PRESSURE: 78 MMHG | BODY MASS INDEX: 44.84 KG/M2 | SYSTOLIC BLOOD PRESSURE: 116 MMHG | WEIGHT: 245.13 LBS | RESPIRATION RATE: 18 BRPM | OXYGEN SATURATION: 97 % | HEART RATE: 93 BPM

## 2022-01-04 DIAGNOSIS — Z20.822 ENCOUNTER FOR LABORATORY TESTING FOR COVID-19 VIRUS: Primary | ICD-10-CM

## 2022-01-04 DIAGNOSIS — R05.9 COUGH: ICD-10-CM

## 2022-01-04 DIAGNOSIS — H92.02 LEFT EAR PAIN: ICD-10-CM

## 2022-01-04 DIAGNOSIS — R09.81 NASAL CONGESTION: ICD-10-CM

## 2022-01-04 DIAGNOSIS — R51.9 ACUTE NONINTRACTABLE HEADACHE, UNSPECIFIED HEADACHE TYPE: ICD-10-CM

## 2022-01-04 PROCEDURE — U0003 INFECTIOUS AGENT DETECTION BY NUCLEIC ACID (DNA OR RNA); SEVERE ACUTE RESPIRATORY SYNDROME CORONAVIRUS 2 (SARS-COV-2) (CORONAVIRUS DISEASE [COVID-19]), AMPLIFIED PROBE TECHNIQUE, MAKING USE OF HIGH THROUGHPUT TECHNOLOGIES AS DESCRIBED BY CMS-2020-01-R: HCPCS | Performed by: STUDENT IN AN ORGANIZED HEALTH CARE EDUCATION/TRAINING PROGRAM

## 2022-01-04 PROCEDURE — 99283 EMERGENCY DEPT VISIT LOW MDM: CPT

## 2022-01-04 RX ORDER — BENZONATATE 100 MG/1
100 CAPSULE ORAL 3 TIMES DAILY PRN
Qty: 20 CAPSULE | Refills: 0 | Status: SHIPPED | OUTPATIENT
Start: 2022-01-04 | End: 2022-01-14

## 2022-01-04 NOTE — Clinical Note
"Cortez "Tami Radford was seen and treated in our emergency department on 1/4/2022.     COVID-19 is present in our communities across the state. There is limited testing for COVID at this time, so not all patients can be tested. In this situation, your employee meets the following criteria:    Cortez Radford has met the criteria for COVID-19 testing based upon symptoms, travel, and/or potential exposure. The test has been completed and is pending results at this time. During this time the employee is not able to work and should be quarantined per the Centers for Disease Control timelines.     If you have any questions or concerns, or if I can be of further assistance, please do not hesitate to contact me.    Sincerely,             Randolph Nevarez, DO"

## 2022-01-04 NOTE — ED TRIAGE NOTES
31 y.o. female presents to ER Room/bed info not found   Chief Complaint   Patient presents with    COVID-19 Concerns   .   C/o cough, congestion, and headache since Friday

## 2022-01-04 NOTE — DISCHARGE INSTRUCTIONS
If you have a COVID Test PENDING:  Please access [facility] to review the results of your test. Until the results of your COVID test return, you should isolate yourself so as not to potentially spread illness to others.   If your COVID test returns positive, you should isolate yourself so as not to spread illness to others. After five full days, if you are feeling better and you have not had fever for 24 hours, you can return to your typical daily activities, but you must wear a mask around others for an additional 5 days.   If your COVID test returns negative and you are either unvaccinated or more than six months out from your two-dose vaccine and are not yet boosted, you should still quarantine for 5 full days followed by strict mask use for an additional 5 full days.   If your COVID test returns negative and you have received your 2-dose initial vaccine as well as a booster, you should continue strict mask use for 10 full days after the exposure.  For all those exposed, best practice includes a test at day 5 after the exposure. This can be a home test or a test through one of the many testing centers throughout our community.   Masking is always advised to limit the spread of COVID. Cdc.gov is an excellent site to obtain the latest up to date recommendations regarding COVID and COVID testing.     After your evaluation today, we ruled out any emergent condition. However, if you develop shortness of breath, chest pain, or ANY OTHER CONCERNS please return to the emergency department for further care.      CDC Testing and Quarantine Guidelines for patients with exposure to a known-positive COVID-19 person:  A close exposure is defined as anyone who has had an exposure (masked or unmasked) to a known COVID -19 positive person within 6 feet of someone for a cumulative total of 15 minutes or more over a 24-hour period.   Vaccinated and/or if you recently had a positive covid test within 90 days do NOT need to  quarantine after contact with someone who had COVID-19 unless you develop symptoms.   Fully vaccinated people who have not had a positive test within 90 days, should get tested 3-5 days after their exposure, even if they don't have symptoms and wear a mask indoors in public for 14 days following exposure or until their test result is negative.      Unvaccinated and/or NOT had a positive test within 90 days and meet close exposure  You are required by CDC guidelines to quarantine for at least 5 days from time of exposure followed by 5 days of strict masking. It is recommended, but not required to test after 5 days, unless you develop symptoms, in which case you should test at that time.  If you get tested after 5 days and your test is positive, your 5 day period of isolation starts the day of the positive test.    If your exposure does not meet the above definition, you can return to your normal daily activities to include social distancing, wearing a mask and frequent handwashing.      Here is a link to guidance from the CDC:  https://www.cdc.gov/media/releases/2021/s1227-isolation-quarantine-guidance.html      [facility] Testing Sites:  https://ldh.la.gov/page/3934      [facility] website with testing locations and guidance:  [email or web address]

## 2022-01-04 NOTE — ED PROVIDER NOTES
Encounter Date: 1/4/2022    This document was partially completed using speech recognition software and may contain misspellings, grammatical errors, and/or unexpected word substitutions.       History     Chief Complaint   Patient presents with    COVID-19 Concerns     31-year-old female presents to the emergency department with cough, congestion, mild headaches.  States that it started on Friday. Had some coworkers that tested positive but she wasn't around them since they've tested. Denies chest pain or shortness of breath.        Review of patient's allergies indicates:  No Known Allergies  Past Medical History:   Diagnosis Date    Allergy     GERD (gastroesophageal reflux disease)     Thyroid dysfunction      Past Surgical History:   Procedure Laterality Date    ADENOIDECTOMY      CHOLECYSTECTOMY      TONSILLECTOMY       Family History   Problem Relation Age of Onset    Cancer Mother         ovarian cancer    Hypertension Father     Diabetes Father      Social History     Tobacco Use    Smoking status: Current Every Day Smoker     Packs/day: 0.50    Smokeless tobacco: Never Used   Substance Use Topics    Alcohol use: No    Drug use: No     Review of Systems   Constitutional: Negative for chills and fever.   HENT: Positive for congestion. Negative for rhinorrhea and sneezing.    Eyes: Negative for discharge and redness.   Respiratory: Positive for cough. Negative for shortness of breath.    Cardiovascular: Negative for chest pain and palpitations.   Gastrointestinal: Negative for abdominal pain, diarrhea, nausea and vomiting.   Genitourinary: Negative for dysuria, frequency, vaginal bleeding and vaginal discharge.   Musculoskeletal: Negative for back pain and neck pain.   Skin: Negative for rash and wound.   Neurological: Positive for headaches. Negative for weakness and numbness.       Physical Exam     Initial Vitals [01/04/22 1004]   BP Pulse Resp Temp SpO2   116/78 93 18 97.9 °F (36.6 °C) 97 %       MAP       --         Physical Exam    Vitals reviewed.  Constitutional: She appears well-developed. She is not diaphoretic. No distress.   HENT:   Head: Normocephalic and atraumatic.   Right Ear: External ear normal.   Left Ear: External ear normal.   Eyes: Right eye exhibits no discharge. Left eye exhibits no discharge. No scleral icterus.   Neck: Neck supple.   Cardiovascular: Normal rate and regular rhythm.   Pulmonary/Chest: Breath sounds normal. No stridor. No respiratory distress. She has no wheezes. She has no rhonchi. She has no rales.   Abdominal: Abdomen is soft. There is no abdominal tenderness. There is no guarding.   Musculoskeletal:         General: No edema.      Cervical back: Neck supple.     Neurological: She is alert and oriented to person, place, and time.   Skin: Skin is warm and dry. Capillary refill takes less than 2 seconds.   Psychiatric: She has a normal mood and affect.         ED Course   Procedures  Labs Reviewed   SARS-COV-2 (COVID-19) QUALITATIVE PCR          Imaging Results    None          Medications - No data to display  Medical Decision Making:   Differential Diagnosis:   DDx: COVID19, viral URI, flu, PNA  ED Management:  Based on the patient's evaluation, the patient appears well for discharge home.  Reassuring vital signs. Routine COVID19 test done. Quarantine precautions given. Will discharge home with PCP f/u. Return precautions given. Patient is in agreement with the plan.                      Clinical Impression:   Final diagnoses:  [Z20.822] Encounter for laboratory testing for COVID-19 virus (Primary)  [R51.9] Acute nonintractable headache, unspecified headache type  [R05.9] Cough  [R09.81] Nasal congestion  [H92.02] Left ear pain          ED Disposition Condition    Discharge Stable        ED Prescriptions     Medication Sig Dispense Start Date End Date Auth. Provider    benzonatate (TESSALON) 100 MG capsule Take 1 capsule (100 mg total) by mouth 3 (three) times  daily as needed for Cough. 20 capsule 1/4/2022 1/14/2022 Randolph Nevarez,         Follow-up Information     Follow up With Specialties Details Why Contact Info    Emilia Davis NP Family Medicine Schedule an appointment as soon as possible for a visit in 1 week As needed [address] [facility] [address]  [phone]             Randolph Nevarez DO  01/04/22 1142

## 2022-01-09 ENCOUNTER — HOSPITAL ENCOUNTER (EMERGENCY)
Facility: HOSPITAL | Age: 32
Discharge: HOME | End: 2022-01-09
Attending: STUDENT IN AN ORGANIZED HEALTH CARE EDUCATION/TRAINING PROGRAM
Payer: MEDICAID

## 2022-01-09 VITALS
TEMPERATURE: 98 F | BODY MASS INDEX: 44.9 KG/M2 | DIASTOLIC BLOOD PRESSURE: 73 MMHG | SYSTOLIC BLOOD PRESSURE: 120 MMHG | HEART RATE: 88 BPM | OXYGEN SATURATION: 97 % | WEIGHT: 245.5 LBS | RESPIRATION RATE: 16 BRPM

## 2022-01-09 DIAGNOSIS — N20.0 KIDNEY STONE: Primary | ICD-10-CM

## 2022-01-09 LAB
ALBUMIN SERPL BCP-MCNC: 4 G/DL (ref 3.5–5.2)
ALP SERPL-CCNC: 66 U/L (ref 55–135)
ALT SERPL W/O P-5'-P-CCNC: 30 U/L (ref 10–44)
ANION GAP SERPL CALC-SCNC: 8 MMOL/L (ref 8–16)
AST SERPL-CCNC: 17 U/L (ref 10–40)
B-HCG UR QL: NEGATIVE
BACTERIA #/AREA URNS HPF: ABNORMAL /HPF
BASOPHILS # BLD AUTO: 0.03 K/UL (ref 0–0.2)
BASOPHILS NFR BLD: 0.3 % (ref 0–1.9)
BILIRUB SERPL-MCNC: 0.4 MG/DL (ref 0.1–1)
BILIRUB UR QL STRIP: NEGATIVE
BUN SERPL-MCNC: 10 MG/DL (ref 6–20)
CALCIUM SERPL-MCNC: 8.9 MG/DL (ref 8.7–10.5)
CHLORIDE SERPL-SCNC: 106 MMOL/L (ref 95–110)
CLARITY UR: ABNORMAL
CO2 SERPL-SCNC: 25 MMOL/L (ref 23–29)
COLOR UR: ABNORMAL
CREAT SERPL-MCNC: 0.8 MG/DL (ref 0.5–1.4)
DIFFERENTIAL METHOD: NORMAL
EOSINOPHIL # BLD AUTO: 0.1 K/UL (ref 0–0.5)
EOSINOPHIL NFR BLD: 0.9 % (ref 0–8)
ERYTHROCYTE [DISTWIDTH] IN BLOOD BY AUTOMATED COUNT: 12.6 % (ref 11.5–14.5)
EST. GFR  (AFRICAN AMERICAN): >60 ML/MIN/1.73 M^2
EST. GFR  (NON AFRICAN AMERICAN): >60 ML/MIN/1.73 M^2
GLUCOSE SERPL-MCNC: 96 MG/DL (ref 70–110)
GLUCOSE UR QL STRIP: NEGATIVE
HCT VFR BLD AUTO: 48.3 % (ref 37–48.5)
HGB BLD-MCNC: 15.8 G/DL (ref 12–16)
HGB UR QL STRIP: ABNORMAL
HYALINE CASTS #/AREA URNS LPF: 0 /LPF
IMM GRANULOCYTES # BLD AUTO: 0.03 K/UL (ref 0–0.04)
IMM GRANULOCYTES NFR BLD AUTO: 0.3 % (ref 0–0.5)
KETONES UR QL STRIP: NEGATIVE
LEUKOCYTE ESTERASE UR QL STRIP: NEGATIVE
LYMPHOCYTES # BLD AUTO: 3.7 K/UL (ref 1–4.8)
LYMPHOCYTES NFR BLD: 31.9 % (ref 18–48)
MCH RBC QN AUTO: 29.8 PG (ref 27–31)
MCHC RBC AUTO-ENTMCNC: 32.7 G/DL (ref 32–36)
MCV RBC AUTO: 91 FL (ref 82–98)
MICROSCOPIC COMMENT: ABNORMAL
MONOCYTES # BLD AUTO: 0.8 K/UL (ref 0.3–1)
MONOCYTES NFR BLD: 6.6 % (ref 4–15)
NEUTROPHILS # BLD AUTO: 7.1 K/UL (ref 1.8–7.7)
NEUTROPHILS NFR BLD: 60 % (ref 38–73)
NITRITE UR QL STRIP: NEGATIVE
NRBC BLD-RTO: 0 /100 WBC
PH UR STRIP: 7 [PH] (ref 5–8)
PLATELET # BLD AUTO: 190 K/UL (ref 150–450)
PMV BLD AUTO: 12 FL (ref 9.2–12.9)
POTASSIUM SERPL-SCNC: 4.1 MMOL/L (ref 3.5–5.1)
PROT SERPL-MCNC: 7 G/DL (ref 6–8.4)
PROT UR QL STRIP: ABNORMAL
RBC # BLD AUTO: 5.31 M/UL (ref 4–5.4)
RBC #/AREA URNS HPF: >100 /HPF (ref 0–4)
SARS-COV-2 RNA RESP QL NAA+PROBE: NOT DETECTED
SODIUM SERPL-SCNC: 139 MMOL/L (ref 136–145)
SP GR UR STRIP: >=1.03 (ref 1–1.03)
SQUAMOUS #/AREA URNS HPF: 2 /HPF
URN SPEC COLLECT METH UR: ABNORMAL
UROBILINOGEN UR STRIP-ACNC: NEGATIVE EU/DL
WBC # BLD AUTO: 11.73 K/UL (ref 3.9–12.7)
WBC #/AREA URNS HPF: 0 /HPF (ref 0–5)

## 2022-01-09 PROCEDURE — 36415 COLL VENOUS BLD VENIPUNCTURE: CPT | Performed by: STUDENT IN AN ORGANIZED HEALTH CARE EDUCATION/TRAINING PROGRAM

## 2022-01-09 PROCEDURE — 99284 EMERGENCY DEPT VISIT MOD MDM: CPT | Mod: 25

## 2022-01-09 PROCEDURE — 85025 COMPLETE CBC W/AUTO DIFF WBC: CPT | Performed by: STUDENT IN AN ORGANIZED HEALTH CARE EDUCATION/TRAINING PROGRAM

## 2022-01-09 PROCEDURE — 80053 COMPREHEN METABOLIC PANEL: CPT | Performed by: STUDENT IN AN ORGANIZED HEALTH CARE EDUCATION/TRAINING PROGRAM

## 2022-01-09 PROCEDURE — 81025 URINE PREGNANCY TEST: CPT | Performed by: STUDENT IN AN ORGANIZED HEALTH CARE EDUCATION/TRAINING PROGRAM

## 2022-01-09 PROCEDURE — 81000 URINALYSIS NONAUTO W/SCOPE: CPT | Performed by: STUDENT IN AN ORGANIZED HEALTH CARE EDUCATION/TRAINING PROGRAM

## 2022-01-09 RX ORDER — TAMSULOSIN HYDROCHLORIDE 0.4 MG/1
0.4 CAPSULE ORAL DAILY
Qty: 30 CAPSULE | Refills: 0 | Status: SHIPPED | OUTPATIENT
Start: 2022-01-09 | End: 2022-03-28

## 2022-01-09 NOTE — ED PROVIDER NOTES
[facility]                                                  Chief Complaint     Chief Complaint   Patient presents with    Urinary Frequency     Pt reports urinary frequency and dark colored urine       History of Present Illness  31 y.o. female with PMHx of GERD, subclinical hypothyroidism who presents with progressively discolored urine x 1 week. 1 week ago she noticed that her urine was dark. Since then, it has progressed to being brown in color.  She was started on ciprofloxacin by her PCP due to presumed UTI on 12/14 , although she did not have a UA at that time. Endorses intermittent L lower back pain. Endorses increased urinary frequency and urgency. Denies dysuria. Denies vaginal bleeding, abdominal pain, nausea, vomiting, fever, chills, fatigue.     History obtained from:  Patient    Review of patient's allergies indicates:  No Known Allergies  Past Medical History:   Diagnosis Date    Allergy     GERD (gastroesophageal reflux disease)     Thyroid dysfunction      Past Surgical History:   Procedure Laterality Date    ADENOIDECTOMY      CHOLECYSTECTOMY      TONSILLECTOMY        Family History   Problem Relation Age of Onset    Cancer Mother         ovarian cancer    Hypertension Father     Diabetes Father      Social History     Tobacco Use    Smoking status: Current Every Day Smoker     Packs/day: 0.50    Smokeless tobacco: Never Used   Substance Use Topics    Alcohol use: No    Drug use: No          Review of Systems and Physical Exam     Review of Systems      + Hematuria, urinary urgency, increased urinary frequency    All other symptoms negative as stated below    --Constitutional - Denies fever, appetite change, chills  --Eyes - Denies eye discharge, eye pain, eye redness or visual disturbance  --HENT- Denies congestion, sore throat, drooling, ear discharge, rhinorrhea or trouble swallowing  --Respiratory - Denies cough, shortness or breath, wheezing  --Cardiovascular - Denies  chest pain, leg swelling, palpitations  --Gastrointestinal - Denies abdominal pain, abdominal distension, constipation, diarrhea, nausea or vomiting  --Genitourinary - Denies difficulty urinating, dysuria  --Musculoskeletal - Denies arthralgias, myalgias  --Neurological - Denies dizziness, headaches, lightheadedness, weakness  --Hematologic - Denies easy bruising or easy bleeding  --Skin - Denies rash, wound or pallor  --Psychiatric- Denies dysphoric mood, nervousness/anxiety    Vital Signs   weight is 111.4 kg (245 lb 7.7 oz). Her oral temperature is 97.7 °F (36.5 °C). Her blood pressure is 120/73 and her pulse is 88. Her respiration is 16 and oxygen saturation is 97%.      Physical Exam   Nursing note and vitals reviewed  --Constitutional:  Well developed, well nourished  --HENT: Normocephalic, atraumatic. No rhinorrhea. Moist oral mucosa. No oropharyngeal edema, erythema or exudates.   --Eyes: PERRL. Extraocular movements intact. No periorbital swelling. Normal conjunctiva.  --Neck: Normal range of motion. Neck supple. No adenopathy  --Cardiac: Regular rhythm, normal S1, normal S2, no murmur, normal rate, intact distal pulses  --Pulmonary: Normal respiratory effort, breath sounds normal and equal bilaterally, no accessory muscle use, no respiratory distress  --Abdominal: Soft, normal bowel sounds, no tenderness, no guarding, no rebound  --Musculoskeletal: R flank tenderness. Normal range of motion. No deformity, tenderness or edema.  --Neurological:  Alert and oriented x 4. Follows commands appropriately.    --Skin: Warm and dry. No rash, pallor, cyanosis or jaundice.  --Psych: Normal mood    ED Course   Lab Results (reviewed by the physician)  Labs Reviewed   URINALYSIS, REFLEX TO URINE CULTURE - Abnormal; Notable for the following components:       Result Value    Color, UA Red (*)     Appearance, UA Cloudy (*)     Specific Gravity, UA >=1.030 (*)     Protein, UA 1+ (*)     Occult Blood UA 3+ (*)     All other  components within normal limits    Narrative:     Specimen Source->Urine   URINALYSIS MICROSCOPIC - Abnormal; Notable for the following components:    RBC, UA >100 (*)     All other components within normal limits    Narrative:     Specimen Source->Urine   PREGNANCY TEST, URINE RAPID    Narrative:     Specimen Source->Urine   CBC W/ AUTO DIFFERENTIAL   COMPREHENSIVE METABOLIC PANEL     Radiology (images visualized & reports reviewed by the physician)  CT Renal Stone Study ABD Pelvis WO   Final Result      1. Small 2 mm left proximal ureteral stone resulting in mild left-sided hydronephrosis.   2. Additional nonobstructing left renal stones.   3. Cholecystectomy.         Electronically signed by: Tamiko Brar MD   Date:    01/09/2022   Time:    17:43          Medications Given  Medications - No data to display    Differential Diagnosis:  Cystitis, pyelonephritis, nephrolithiasis, pancreatitis, pneumonia      Clinical Tests:  Lab Tests: Ordered and reviewed  Radiological Study: Ordered and reviewed  Medical Tests: Ordered and reviewed    ED Management  Vital signs within normal limits.  Physical exam with L flank tenderness.  Labs with hematuria but otherwise unremarkable.  Imaging (CT-Renal Stone Study) revealed 2mm L proximal ureteral stone with mild L-sided hydronephrosis, along with nonobstructing left renal stones. She was discharged with  Patient to follow up with Emilia Davis NP in 1-2 days, and any specialists noted on discharge paperwork. Patient was deemed stable for discharge. Strict return precautions given. Understanding of the plan was expressed and all questions were answered.    Diagnosis  The encounter diagnosis was Kidney stone.    Disposition and Plan  Condition: Stable  Disposition: Discharge      ED Prescriptions     Medication Sig Dispense Start Date End Date Auth. Provider    tamsulosin (FLOMAX) 0.4 mg Cap Take 1 capsule (0.4 mg total) by mouth once daily. 30 capsule 1/9/2022 1/9/2023  Alistair Garcia MD        Follow-up Information     Follow up With Specialties Details Why Contact Info    Emilia Davis NP Family Medicine Schedule an appointment as soon as possible for a visit in 2 days  [address] [facility] [address]  [phone]      [facility] Emergency Dept Emergency Medicine  If symptoms worsen [address] [facility] [address]  [phone]            Alistair Garcia MD  01/09/22 1916       Alistair Garcia MD  01/09/22 1918

## 2022-01-10 ENCOUNTER — PATIENT MESSAGE (OUTPATIENT)
Dept: INTERNAL MEDICINE | Facility: CLINIC | Age: 32
End: 2022-01-10
Payer: MEDICAID

## 2022-01-10 NOTE — DISCHARGE INSTRUCTIONS
Please follow up with your primary care physician within 2 days. Ensure that you review all lab work results and imaging results. If you have any questions about your discharge paperwork please call the Emergency Department.     Return to the ED for any fevers, nausea, vomiting, abdominal pain, diarrhea, inability to take food or water by mouth without vomiting, or any new or worsening symptoms.     Thank you for visiting [facility], Department of Emergency Medicine. Please see the entirety of the educational materials provided. Please note that a visit to the emergency department does not substitute ongoing care from a primary medical provider or specialist. Please ensure to follow up as recommended. However, please return to the emergency department immediately if symptoms do not improve as discussed, symptoms worsen, new symptoms develop, difficulty in following up or for any of your concerns or issues. Please note on discharge you are acknowledging understanding and agreement on medical evaluation, management recommendations and follow up recommendations.

## 2022-01-12 ENCOUNTER — HOSPITAL ENCOUNTER (EMERGENCY)
Facility: HOSPITAL | Age: 32
Discharge: HOME | End: 2022-01-12
Attending: SURGERY
Payer: MEDICAID

## 2022-01-12 VITALS
TEMPERATURE: 98 F | BODY MASS INDEX: 44.78 KG/M2 | SYSTOLIC BLOOD PRESSURE: 131 MMHG | WEIGHT: 244.81 LBS | HEART RATE: 92 BPM | DIASTOLIC BLOOD PRESSURE: 78 MMHG | OXYGEN SATURATION: 98 % | RESPIRATION RATE: 16 BRPM

## 2022-01-12 DIAGNOSIS — R10.9 FLANK PAIN: Primary | ICD-10-CM

## 2022-01-12 LAB
ALBUMIN SERPL BCP-MCNC: 3.7 G/DL (ref 3.5–5.2)
ALP SERPL-CCNC: 60 U/L (ref 55–135)
ALT SERPL W/O P-5'-P-CCNC: 21 U/L (ref 10–44)
ANION GAP SERPL CALC-SCNC: 8 MMOL/L (ref 8–16)
AST SERPL-CCNC: 13 U/L (ref 10–40)
BASOPHILS # BLD AUTO: 0.03 K/UL (ref 0–0.2)
BASOPHILS NFR BLD: 0.2 % (ref 0–1.9)
BILIRUB SERPL-MCNC: 0.6 MG/DL (ref 0.1–1)
BILIRUB UR QL STRIP: NEGATIVE
BUN SERPL-MCNC: 13 MG/DL (ref 6–20)
CALCIUM SERPL-MCNC: 9 MG/DL (ref 8.7–10.5)
CHLORIDE SERPL-SCNC: 106 MMOL/L (ref 95–110)
CLARITY UR: CLEAR
CO2 SERPL-SCNC: 24 MMOL/L (ref 23–29)
COLOR UR: YELLOW
CREAT SERPL-MCNC: 1 MG/DL (ref 0.5–1.4)
DIFFERENTIAL METHOD: ABNORMAL
EOSINOPHIL # BLD AUTO: 0.1 K/UL (ref 0–0.5)
EOSINOPHIL NFR BLD: 0.6 % (ref 0–8)
ERYTHROCYTE [DISTWIDTH] IN BLOOD BY AUTOMATED COUNT: 12.4 % (ref 11.5–14.5)
EST. GFR  (AFRICAN AMERICAN): >60 ML/MIN/1.73 M^2
EST. GFR  (NON AFRICAN AMERICAN): >60 ML/MIN/1.73 M^2
GLUCOSE SERPL-MCNC: 118 MG/DL (ref 70–110)
GLUCOSE UR QL STRIP: NEGATIVE
HCT VFR BLD AUTO: 44.4 % (ref 37–48.5)
HGB BLD-MCNC: 14.8 G/DL (ref 12–16)
HGB UR QL STRIP: ABNORMAL
IMM GRANULOCYTES # BLD AUTO: 0.04 K/UL (ref 0–0.04)
IMM GRANULOCYTES NFR BLD AUTO: 0.3 % (ref 0–0.5)
KETONES UR QL STRIP: NEGATIVE
LEUKOCYTE ESTERASE UR QL STRIP: NEGATIVE
LYMPHOCYTES # BLD AUTO: 2.5 K/UL (ref 1–4.8)
LYMPHOCYTES NFR BLD: 19.9 % (ref 18–48)
MCH RBC QN AUTO: 29.8 PG (ref 27–31)
MCHC RBC AUTO-ENTMCNC: 33.3 G/DL (ref 32–36)
MCV RBC AUTO: 90 FL (ref 82–98)
MONOCYTES # BLD AUTO: 1.2 K/UL (ref 0.3–1)
MONOCYTES NFR BLD: 9.7 % (ref 4–15)
NEUTROPHILS # BLD AUTO: 8.6 K/UL (ref 1.8–7.7)
NEUTROPHILS NFR BLD: 69.3 % (ref 38–73)
NITRITE UR QL STRIP: NEGATIVE
NRBC BLD-RTO: 0 /100 WBC
PH UR STRIP: 6 [PH] (ref 5–8)
PLATELET # BLD AUTO: 151 K/UL (ref 150–450)
PMV BLD AUTO: 10.9 FL (ref 9.2–12.9)
POTASSIUM SERPL-SCNC: 3.5 MMOL/L (ref 3.5–5.1)
PROT SERPL-MCNC: 6.4 G/DL (ref 6–8.4)
PROT UR QL STRIP: NEGATIVE
RBC # BLD AUTO: 4.96 M/UL (ref 4–5.4)
SODIUM SERPL-SCNC: 138 MMOL/L (ref 136–145)
SP GR UR STRIP: 1.02 (ref 1–1.03)
URN SPEC COLLECT METH UR: ABNORMAL
UROBILINOGEN UR STRIP-ACNC: NEGATIVE EU/DL
WBC # BLD AUTO: 12.44 K/UL (ref 3.9–12.7)

## 2022-01-12 PROCEDURE — 25000003 PHARM REV CODE 250: Performed by: NURSE PRACTITIONER

## 2022-01-12 PROCEDURE — 99284 EMERGENCY DEPT VISIT MOD MDM: CPT | Mod: 25

## 2022-01-12 PROCEDURE — 96361 HYDRATE IV INFUSION ADD-ON: CPT

## 2022-01-12 PROCEDURE — 36415 COLL VENOUS BLD VENIPUNCTURE: CPT | Performed by: NURSE PRACTITIONER

## 2022-01-12 PROCEDURE — 80053 COMPREHEN METABOLIC PANEL: CPT | Performed by: NURSE PRACTITIONER

## 2022-01-12 PROCEDURE — 96374 THER/PROPH/DIAG INJ IV PUSH: CPT

## 2022-01-12 PROCEDURE — 85025 COMPLETE CBC W/AUTO DIFF WBC: CPT | Performed by: NURSE PRACTITIONER

## 2022-01-12 PROCEDURE — 63600175 PHARM REV CODE 636 W HCPCS: Performed by: NURSE PRACTITIONER

## 2022-01-12 PROCEDURE — 81003 URINALYSIS AUTO W/O SCOPE: CPT | Performed by: NURSE PRACTITIONER

## 2022-01-12 RX ORDER — KETOROLAC TROMETHAMINE 30 MG/ML
30 INJECTION, SOLUTION INTRAMUSCULAR; INTRAVENOUS
Status: COMPLETED | OUTPATIENT
Start: 2022-01-12 | End: 2022-01-12

## 2022-01-12 RX ORDER — SODIUM CHLORIDE 9 MG/ML
INJECTION, SOLUTION INTRAVENOUS
Status: COMPLETED | OUTPATIENT
Start: 2022-01-12 | End: 2022-01-12

## 2022-01-12 RX ORDER — KETOROLAC TROMETHAMINE 30 MG/ML
30 INJECTION, SOLUTION INTRAMUSCULAR; INTRAVENOUS
Status: DISCONTINUED | OUTPATIENT
Start: 2022-01-12 | End: 2022-01-12

## 2022-01-12 RX ORDER — TRAMADOL HYDROCHLORIDE 50 MG/1
50 TABLET ORAL EVERY 6 HOURS PRN
Qty: 10 TABLET | Refills: 0 | Status: SHIPPED | OUTPATIENT
Start: 2022-01-12 | End: 2022-03-28

## 2022-01-12 RX ORDER — KETOROLAC TROMETHAMINE 30 MG/ML
15 INJECTION, SOLUTION INTRAMUSCULAR; INTRAVENOUS
Status: DISCONTINUED | OUTPATIENT
Start: 2022-01-12 | End: 2022-01-12

## 2022-01-12 RX ADMIN — KETOROLAC TROMETHAMINE 30 MG: 30 INJECTION, SOLUTION INTRAMUSCULAR at 05:01

## 2022-01-12 RX ADMIN — SODIUM CHLORIDE: 0.9 INJECTION, SOLUTION INTRAVENOUS at 05:01

## 2022-01-12 NOTE — ED TRIAGE NOTES
31 y.o. female presents to ER   Chief Complaint   Patient presents with    Flank Pain   pt c/o left sided flank pain & nausea, dx Sunday with kidney stone, denies fever, vomiting, or diarrhea. No acute distress noted.

## 2022-01-12 NOTE — DISCHARGE INSTRUCTIONS
Please take medications as prescribed  Please refer plenty of fluids to facilitate passage of kidney stone  Please follow-up with Urology

## 2022-01-12 NOTE — ED PROVIDER NOTES
Encounter Date: 1/12/2022       History     Chief Complaint   Patient presents with    Flank Pain     Chief complaint:  Flank pain  Th[age] year female with a recent diagnosis of 2 mm left kidney stone presents to the ED for increased pain.  Patient states that she has also had some nausea.  States that Flomax is not doing anything to help her.  Denies any blood in her urine denies any vomiting denies body aches fever chills        Review of patient's allergies indicates:  No Known Allergies  Past Medical History:   Diagnosis Date    Allergy     GERD (gastroesophageal reflux disease)     Thyroid dysfunction      Past Surgical History:   Procedure Laterality Date    ADENOIDECTOMY      CHOLECYSTECTOMY      TONSILLECTOMY       Family History   Problem Relation Age of Onset    Cancer Mother         ovarian cancer    Hypertension Father     Diabetes Father      Social History     Tobacco Use    Smoking status: Current Every Day Smoker     Packs/day: 0.50    Smokeless tobacco: Never Used   Substance Use Topics    Alcohol use: No    Drug use: No     Review of Systems   Constitutional: Negative.    Respiratory: Negative.    Cardiovascular: Negative.    Genitourinary: Positive for flank pain and pelvic pain.       Physical Exam     Initial Vitals [01/12/22 1515]   BP Pulse Resp Temp SpO2   131/78 92 16 97.5 °F (36.4 °C) 98 %      MAP       --         Physical Exam    Nursing note and vitals reviewed.  Constitutional: She appears well-developed and well-nourished.   HENT:   Mouth/Throat: Oropharynx is clear and moist.   Eyes: EOM are normal. Pupils are equal, round, and reactive to light.   Neck: Neck supple.   Normal range of motion.  Cardiovascular: Normal rate, regular rhythm and normal heart sounds.   Pulmonary/Chest: Breath sounds normal.   Abdominal: Abdomen is soft. There is no abdominal tenderness. There is no rebound and no guarding.   Musculoskeletal:      Cervical back: Normal range of motion and neck  supple.     Neurological: She is alert.   Skin: Skin is warm and dry. Capillary refill takes less than 2 seconds.   Psychiatric: She has a normal mood and affect. Thought content normal.         ED Course   Procedures  Labs Reviewed   CBC W/ AUTO DIFFERENTIAL - Abnormal; Notable for the following components:       Result Value    Gran # (ANC) 8.6 (*)     Mono # 1.2 (*)     All other components within normal limits   COMPREHENSIVE METABOLIC PANEL - Abnormal; Notable for the following components:    Glucose 118 (*)     All other components within normal limits   URINALYSIS, REFLEX TO URINE CULTURE - Abnormal; Notable for the following components:    Occult Blood UA Trace (*)     All other components within normal limits    Narrative:     Specimen Source->Urine          Imaging Results    None          Medications   0.9%  NaCl infusion ( Intravenous New Bag 1/12/22 1704)   ketorolac injection 30 mg (30 mg Intravenous Given 1/12/22 1707)     Medical Decision Making:   Differential Diagnosis:   Flank pain, UTI, pyelo  ED Management:  Th[age] year female with recent diagnosis 2 mm nonobstructing kidney stone presents to the ED for continued pain.  Patient states that she was given Flomax which did not help her symptoms taking Aleve with no improvement in her symptoms of pain.  She has no blood in her urine patient's CBC grossly within normal limits urinalysis is clean.  Treated with Toradol and fluids in the ED which did improve her pain.  To follow-up with reports patient Urology consult as previously given.  Given return precautions including but not limited to new worsening symptoms                      Clinical Impression:   Final diagnoses:  [R10.9] Flank pain (Primary)          ED Disposition Condition    Discharge Stable        ED Prescriptions     Medication Sig Dispense Start Date End Date Auth. Provider    traMADoL (ULTRAM) 50 mg tablet Take 1 tablet (50 mg total) by mouth every 6 (six) hours as needed for Pain.  10 tablet 1/12/2022  Fatoumata Cornell NP        Follow-up Information    None          Fatoumata Cornell, SOLO  01/12/22 5426

## 2022-01-14 ENCOUNTER — PATIENT OUTREACH (OUTPATIENT)
Dept: EMERGENCY MEDICINE | Facility: HOSPITAL | Age: 32
End: 2022-01-14
Payer: MEDICAID

## 2022-01-14 NOTE — PROGRESS NOTES
Patient declined assistance making a follow-up appointment with her PCP at this time. Patient declined ED navigation assessment and denied any needs/resources at this time.     Oliva Meza  ED Navigator- [place]

## 2022-02-21 LAB
HUMAN PAPILLOMAVIRUS (HPV): NORMAL
PAP RECOMMENDATION EXT: NORMAL

## 2022-03-29 ENCOUNTER — PATIENT OUTREACH (OUTPATIENT)
Dept: ADMINISTRATIVE | Facility: HOSPITAL | Age: 32
End: 2022-03-29
Payer: MEDICAID

## 2022-05-10 ENCOUNTER — HOSPITAL ENCOUNTER (EMERGENCY)
Facility: HOSPITAL | Age: 32
Discharge: HOME | End: 2022-05-10
Attending: SURGERY
Payer: MEDICAID

## 2022-05-10 VITALS
DIASTOLIC BLOOD PRESSURE: 61 MMHG | TEMPERATURE: 98 F | BODY MASS INDEX: 44.74 KG/M2 | RESPIRATION RATE: 18 BRPM | WEIGHT: 244.63 LBS | HEART RATE: 85 BPM | SYSTOLIC BLOOD PRESSURE: 112 MMHG | OXYGEN SATURATION: 100 %
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
## 2022-05-10 DIAGNOSIS — R20.2 PARESTHESIA: ICD-10-CM

## 2022-05-10 DIAGNOSIS — M79.18 MUSCULOSKELETAL PAIN: Primary | ICD-10-CM

## 2022-05-10 LAB — B-HCG UR QL: NEGATIVE

## 2022-05-10 PROCEDURE — 63600175 PHARM REV CODE 636 W HCPCS: Performed by: NURSE PRACTITIONER

## 2022-05-10 PROCEDURE — 81025 URINE PREGNANCY TEST: CPT | Performed by: NURSE PRACTITIONER

## 2022-05-10 PROCEDURE — 99284 EMERGENCY DEPT VISIT MOD MDM: CPT | Mod: 25

## 2022-05-10 PROCEDURE — 96372 THER/PROPH/DIAG INJ SC/IM: CPT | Performed by: NURSE PRACTITIONER

## 2022-05-10 RX ORDER — TRAMADOL HYDROCHLORIDE 50 MG/1
50 TABLET ORAL EVERY 6 HOURS PRN
Qty: 12 TABLET | Refills: 0 | Status: SHIPPED | OUTPATIENT
Start: 2022-05-10 | End: 2022-05-26

## 2022-05-10 RX ORDER — KETOROLAC TROMETHAMINE 30 MG/ML
15 INJECTION, SOLUTION INTRAMUSCULAR; INTRAVENOUS
Status: COMPLETED | OUTPATIENT
Start: 2022-05-10 | End: 2022-05-10

## 2022-05-10 RX ADMIN — KETOROLAC TROMETHAMINE 15 MG: 30 INJECTION, SOLUTION INTRAMUSCULAR at 02:05

## 2022-05-10 NOTE — Clinical Note
"[place]"Tami Radford was seen and treated in our emergency department on 5/10/2022.  She may return to work on 05/13/2022.       If you have any questions or concerns, please don't hesitate to call.      Fatoumata Cornell NP"

## 2022-05-10 NOTE — ED PROVIDER NOTES
"Encounter Date: 5/10/2022       History     Chief Complaint   Patient presents with    Fall     Patient to ER with c/c of a fall 2 days ago states " patient states I fell on my right knee and felt a pop in my back when I turn a certain way I feel numbness in my arm "     Chief complaint:  Fall  [age] year female with history of GERD thyroid dysfunction BC presents to be evaluated for pain her right knee low back and right shoulder after she had a fall 2 days prior.  Currently states she is 8/10 deep aching pain exacerbated with motion and certain position changes and has alleviating factors.  Reports taking ibuprofen with no improvement in her symptoms.  Denies numbness or tingling and in the lower extremities. She reports she has incontience issues but that it has been a long standing issue for which she is already being evaluated by PCP.         Review of patient's allergies indicates:  No Known Allergies  Past Medical History:   Diagnosis Date    Allergy     GERD (gastroesophageal reflux disease)     Thyroid dysfunction      Past Surgical History:   Procedure Laterality Date    ADENOIDECTOMY      CHOLECYSTECTOMY      TONSILLECTOMY       Family History   Problem Relation Age of Onset    Cancer Mother         ovarian cancer    Hypertension Father     Diabetes Father      Social History     Tobacco Use    Smoking status: Current Every Day Smoker     Packs/day: 1.00     Types: Cigarettes    Smokeless tobacco: Never Used   Substance Use Topics    Alcohol use: Yes     Comment: once every few months    Drug use: Yes     Frequency: 1.0 times per week     Types: Marijuana     Comment: once a week     Review of Systems   Constitutional: Positive for activity change.   Respiratory: Negative for shortness of breath.    Cardiovascular: Negative for chest pain and leg swelling.   Gastrointestinal: Negative for abdominal pain.   Musculoskeletal: Positive for arthralgias, back pain and myalgias.   Skin: Negative " for color change.   Neurological: Negative for weakness and numbness.       Physical Exam     Initial Vitals [05/10/22 1432]   BP Pulse Resp Temp SpO2   112/61 85 (!) 97 96.6 °F (35.9 °C) 100 %      MAP       --         Physical Exam    Nursing note and vitals reviewed.  Constitutional: She appears well-developed and well-nourished.   Cardiovascular: Normal rate, regular rhythm and normal heart sounds. Exam reveals no gallop and no friction rub.    No murmur heard.  Pulmonary/Chest: Breath sounds normal. She has no wheezes. She has no rhonchi. She has no rales.   Musculoskeletal:      Comments: Tenderness to R knee, full ROM no warmth crepitus or erythema  Tenderness to left paraspinal tenderness no bony step off or gross deformity of the spine  Tenderness to anterior left shoulder     Neurological: She is alert and oriented to person, place, and time.   Psychiatric: She has a normal mood and affect. Thought content normal.         ED Course   Procedures  Labs Reviewed   PREGNANCY TEST, URINE RAPID    Narrative:     Specimen Source->Urine          Imaging Results          X-Ray Shoulder 2 or More Views Left (Final result)  Result time 05/10/22 15:38:40    Final result by Shade Faith MD (05/10/22 15:38:40)                 Impression:      No acute radiographic findings of the left shoulder.      Electronically signed by: Shade Faith  Date:    05/10/2022  Time:    15:38             Narrative:    EXAMINATION:  XR SHOULDER COMPLETE 2 OR MORE VIEWS LEFT    CLINICAL HISTORY:  fall;    TECHNIQUE:  Two or three views of the left shoulder were performed.    COMPARISON:  None    FINDINGS:  No acute fracture or dislocation.  No significant soft tissue swelling.    Humeral head normally positioned with the glenoid cavity.  Glenohumeral joint space preserved.   AC joint is intact.    Visualized left lung is clear.                               X-Ray Knee 3 View Right (Final result)  Result time 05/10/22 15:39:56     Final result by Shade Faith MD (05/10/22 15:39:56)                 Impression:      No acute radiographic findings of the right knee.    Suspected bone islands of the distal femur and tibial plateau.      Electronically signed by: Shade Faith  Date:    05/10/2022  Time:    15:39             Narrative:    EXAMINATION:  XR KNEE 3 VIEW RIGHT    CLINICAL HISTORY:  Unspecified fall, initial encounter    TECHNIQUE:  AP and lateral views of the right knee.    COMPARISON:  None    FINDINGS:  No acute fracture or dislocation.  No significant soft tissue swelling.    The joint spaces are preserved.  Small 8 mm and 11 mm focus of sclerosis involving the medial femoral condyle and medial tibial plateau.  These may represent bone islands.    No significant suprapatellar effusion.                               X-Ray Lumbar Spine Ap And Lateral (Final result)  Result time 05/10/22 15:38:43    Final result by Hector Celis MD (05/10/22 15:38:43)                 Impression:      1. As above      Electronically signed by: Hector Celis MD  Date:    05/10/2022  Time:    15:38             Narrative:    EXAMINATION:  XR LUMBAR SPINE AP AND LATERAL    CLINICAL HISTORY:  back pain;    TECHNIQUE:  AP, lateral and spot images were performed of the lumbar spine.    COMPARISON:  None    FINDINGS:  Bone density is normal.  The lumbar vertebral bodies maintain normal height.  There is no fracture.  Alignment is normal.  There is no significant disc space narrowing.  There is minimal osteophyte formation at several levels in the lumbar spine.  There is mild facet joint arthropathy at the lower 2 lumbar levels.  There are surgical clips from prior cholecystectomy.                                 Medications   ketorolac injection 15 mg (15 mg Intramuscular Given 5/10/22 1440)     Medical Decision Making:   Differential Diagnosis:   Shoulder strain, sprain, fracture, knee strain, sprain, fracture, lumbar strain, soft tissue  bruising  Clinical Tests:   Radiological Study: Reviewed  ED Management:  31-year-old female with mechanical fall 2 days prior  Patient does have tenderness to the anterior aspect of the right knee no warmth no crepitus erythema for range of motion she does have left-sided paraspinal tenderness no evidence of gross deformities spine patient also has tenderness to the anterior posterior aspect of the shoulder she has pain with internal external rotation as well as flexion and extension patient had x-rays done of the knee back shoulder negative for acute process  Patient likely experiencing musculoskeletal pain secondary to strain and soft tissue injury  Will place on tramadol.  Given Toradol in the ED  She was instructed to follow with PCP  Given referral to Orthopedics                      Clinical Impression:   Final diagnoses:  [W19.XXXA] Fall  [M79.18] Musculoskeletal pain (Primary)  [R20.2] Paresthesia          ED Disposition Condition    Discharge Stable        ED Prescriptions     Medication Sig Dispense Start Date End Date Auth. Provider    traMADoL (ULTRAM) 50 mg tablet Take 1 tablet (50 mg total) by mouth every 6 (six) hours as needed for Pain. 12 tablet 5/10/2022  Fatoumata Cornell NP        Follow-up Information    None          Fatoumata Cornell NP  05/10/22 7532

## 2022-06-08 ENCOUNTER — OFFICE VISIT (OUTPATIENT)
Dept: URGENT CARE | Facility: CLINIC | Age: 32
End: 2022-06-08
Payer: MEDICAID

## 2022-06-08 VITALS
DIASTOLIC BLOOD PRESSURE: 83 MMHG | HEIGHT: 62 IN | RESPIRATION RATE: 20 BRPM | WEIGHT: 238 LBS | BODY MASS INDEX: 43.79 KG/M2 | SYSTOLIC BLOOD PRESSURE: 120 MMHG | TEMPERATURE: 97 F | OXYGEN SATURATION: 96 % | HEART RATE: 83 BPM

## 2022-06-08 DIAGNOSIS — M25.561 ACUTE PAIN OF RIGHT KNEE: Primary | ICD-10-CM

## 2022-06-08 PROCEDURE — 3079F PR MOST RECENT DIASTOLIC BLOOD PRESSURE 80-89 MM HG: ICD-10-PCS | Mod: CPTII,S$GLB,, | Performed by: PHYSICIAN ASSISTANT

## 2022-06-08 PROCEDURE — 1160F RVW MEDS BY RX/DR IN RCRD: CPT | Mod: CPTII,S$GLB,, | Performed by: PHYSICIAN ASSISTANT

## 2022-06-08 PROCEDURE — 99214 OFFICE O/P EST MOD 30 MIN: CPT | Mod: S$GLB,,, | Performed by: PHYSICIAN ASSISTANT

## 2022-06-08 PROCEDURE — 3074F PR MOST RECENT SYSTOLIC BLOOD PRESSURE < 130 MM HG: ICD-10-PCS | Mod: CPTII,S$GLB,, | Performed by: PHYSICIAN ASSISTANT

## 2022-06-08 PROCEDURE — 3074F SYST BP LT 130 MM HG: CPT | Mod: CPTII,S$GLB,, | Performed by: PHYSICIAN ASSISTANT

## 2022-06-08 PROCEDURE — 99214 PR OFFICE/OUTPT VISIT, EST, LEVL IV, 30-39 MIN: ICD-10-PCS | Mod: S$GLB,,, | Performed by: PHYSICIAN ASSISTANT

## 2022-06-08 PROCEDURE — 3079F DIAST BP 80-89 MM HG: CPT | Mod: CPTII,S$GLB,, | Performed by: PHYSICIAN ASSISTANT

## 2022-06-08 PROCEDURE — 1159F MED LIST DOCD IN RCRD: CPT | Mod: CPTII,S$GLB,, | Performed by: PHYSICIAN ASSISTANT

## 2022-06-08 PROCEDURE — 1160F PR REVIEW ALL MEDS BY PRESCRIBER/CLIN PHARMACIST DOCUMENTED: ICD-10-PCS | Mod: CPTII,S$GLB,, | Performed by: PHYSICIAN ASSISTANT

## 2022-06-08 PROCEDURE — 3008F BODY MASS INDEX DOCD: CPT | Mod: CPTII,S$GLB,, | Performed by: PHYSICIAN ASSISTANT

## 2022-06-08 PROCEDURE — 1159F PR MEDICATION LIST DOCUMENTED IN MEDICAL RECORD: ICD-10-PCS | Mod: CPTII,S$GLB,, | Performed by: PHYSICIAN ASSISTANT

## 2022-06-08 PROCEDURE — 3008F PR BODY MASS INDEX (BMI) DOCUMENTED: ICD-10-PCS | Mod: CPTII,S$GLB,, | Performed by: PHYSICIAN ASSISTANT

## 2022-06-08 RX ORDER — METHOCARBAMOL 750 MG/1
750 TABLET, FILM COATED ORAL 3 TIMES DAILY
Qty: 15 TABLET | Refills: 0 | Status: SHIPPED | OUTPATIENT
Start: 2022-06-08 | End: 2022-06-13

## 2022-06-08 RX ORDER — KETOROLAC TROMETHAMINE 30 MG/ML
30 INJECTION, SOLUTION INTRAMUSCULAR; INTRAVENOUS
Status: COMPLETED | OUTPATIENT
Start: 2022-06-08 | End: 2022-06-08

## 2022-06-08 RX ADMIN — KETOROLAC TROMETHAMINE 30 MG: 30 INJECTION, SOLUTION INTRAMUSCULAR; INTRAVENOUS at 12:06

## 2022-06-08 NOTE — PATIENT INSTRUCTIONS
-Take muscle relaxer as needed for pain. Be aware this medication may cause drowsiness.  -Call [phone] to schedule an appointment with orthopedics. A referral has been placed in your chart.    Please follow up with your primary care provider within 2-5 days if your signs and symptoms have not resolved or worsen.     If your condition worsens or fails to improve we recommend that you receive another evaluation at the emergency room immediately or contact your primary medical clinic to discuss your concerns.   You must understand that you have received an Urgent Care treatment only and that you may be released before all of your medical problems are known or treated. You, the patient, will arrange for follow up care as instructed.

## 2022-06-08 NOTE — PROGRESS NOTES
"Subjective:       Patient ID: Cortez Radford is a 31 y.o. female.    Vitals:  height is 5' 2" (1.575 m) and weight is 108 kg (238 lb). Her temperature is 97.1 °F (36.2 °C). Her blood pressure is 120/83 and her pulse is 83. Her respiration is 20 and oxygen saturation is 96%.     Chief Complaint: Knee Pain (Right knee pain/ Hx of injury 1 month ago/ had X-rays done at [facility]/ No re-injury )    Pt presents with right knee pain that has worsened over the past few weeks. She reports already having negative x-rays. Pt states she stands on her feet all day at work which exacerbates her knee pain. She was previously given muscle relaxers which patient states helped, but she ran out. She has tried aleve and ibuprofen with no relief.     Knee Pain   Incident onset: 1 month  Injury mechanism: tripped over a piece of wood and fell on right knee 1 month ago. Pain location: right knee. The pain is at a severity of 8/10. The pain has been constant since onset. Pertinent negatives include no inability to bear weight, loss of motion, loss of sensation, muscle weakness, numbness or tingling. She reports no foreign bodies present. The symptoms are aggravated by weight bearing. She has tried NSAIDs, acetaminophen, immobilization, elevation and rest for the symptoms. The treatment provided mild relief.       Constitution: Negative for chills, fatigue and fever.   HENT: Negative for ear pain, sinus pain and sore throat.    Neck: Negative for neck pain, neck stiffness and painful lymph nodes.   Cardiovascular: Negative for chest pain, palpitations and sob on exertion.   Eyes: Negative for eye discharge, eye itching and eye pain.   Respiratory: Negative for cough, sputum production and shortness of breath.    Gastrointestinal: Negative for abdominal pain, nausea, vomiting and diarrhea.   Genitourinary: Negative for dysuria, hematuria and pelvic pain.   Musculoskeletal: Positive for pain and joint pain. Negative for trauma, joint swelling, " abnormal ROM of joint, muscle cramps and muscle ache.   Skin: Negative for pale, rash and wound.   Neurological: Negative for dizziness, light-headedness, headaches, numbness and tingling.   Hematologic/Lymphatic: Negative for swollen lymph nodes.       Objective:      Physical Exam   Constitutional: She is oriented to person, place, and time. She appears well-developed. She is cooperative.  Non-toxic appearance. She does not appear ill. No distress.   HENT:   Head: Normocephalic and atraumatic.   Ears:   Right Ear: External ear normal.   Left Ear: External ear normal.   Nose: Nose normal.   Mouth/Throat: Oropharynx is clear and moist.   Eyes: Conjunctivae, EOM and lids are normal. Right eye exhibits no discharge. Left eye exhibits no discharge. No scleral icterus.   Neck: Trachea normal and phonation normal. Neck supple.   Cardiovascular: Normal rate, regular rhythm and normal heart sounds.   No murmur heard.Exam reveals no gallop.   Pulmonary/Chest: Effort normal and breath sounds normal. No respiratory distress. She has no decreased breath sounds. She has no wheezes. She has no rhonchi. She has no rales.   Musculoskeletal:         General: No deformity.      Right knee: Normal. She exhibits normal range of motion, no swelling, no effusion, no deformity, no erythema, no LCL laxity, no bony tenderness and no MCL laxity. No tenderness found.      Left knee: Normal.      Right upper leg: Normal. She exhibits no tenderness, no bony tenderness and no swelling.      Left upper leg: Normal.      Right lower leg: Normal. She exhibits no tenderness, no bony tenderness and no swelling. No edema.      Left lower leg: Normal.   Neurological: She is alert and oriented to person, place, and time. She has normal motor skills and normal sensation. Gait and coordination normal. Coordination normal.   Skin: Skin is warm, dry, intact, not diaphoretic, not pale and no rash. No abrasion and No bruising   Psychiatric: Her speech is  normal and behavior is normal. Judgment and thought content normal.   Nursing note and vitals reviewed.        Assessment:       1. Acute pain of right knee          Plan:         Acute pain of right knee  -     Ambulatory referral/consult to Orthopedics  -     ketorolac injection 30 mg  -     methocarbamoL (ROBAXIN) 750 MG Tab; Take 1 tablet (750 mg total) by mouth 3 (three) times daily. for 5 days  Dispense: 15 tablet; Refill: 0      Patient Instructions   -Take muscle relaxer as needed for pain. Be aware this medication may cause drowsiness.  -Call [phone] to schedule an appointment with orthopedics. A referral has been placed in your chart.    Please follow up with your primary care provider within 2-5 days if your signs and symptoms have not resolved or worsen.     If your condition worsens or fails to improve we recommend that you receive another evaluation at the emergency room immediately or contact your primary medical clinic to discuss your concerns.   You must understand that you have received an Urgent Care treatment only and that you may be released before all of your medical problems are known or treated. You, the patient, will arrange for follow up care as instructed.

## 2022-06-14 ENCOUNTER — TELEPHONE (OUTPATIENT)
Dept: ORTHOPEDICS | Facility: CLINIC | Age: 32
End: 2022-06-14
Payer: MEDICAID

## 2022-06-14 NOTE — TELEPHONE ENCOUNTER
----- Message from Callie Mcconnell MA sent at 6/14/2022  1:31 PM CDT -----  Contact: Patient  Patient has medicaid, please call to schedule appt  ----- Message -----  From: Matheus Bocanegra  Sent: 6/14/2022  12:59 PM CDT  To: [facility] Clinical Support    The pt called and needs to schedule an appt     She has a referral in her chart and can be reached at [phone]

## 2022-07-21 ENCOUNTER — TELEPHONE (OUTPATIENT)
Dept: ORTHOPEDICS | Facility: CLINIC | Age: 32
End: 2022-07-21
Payer: MEDICAID

## 2022-07-21 NOTE — TELEPHONE ENCOUNTER
Spoke to Pt @ 2:02pm. Pt states   that someone by the name of ron called her and cancelled her appointment @ [facility]. I told her I can make an appointment at [facility] but I ' am not familiar with the staff. I too mention that her appointment was cancelled probably due to Dr. Oliveira being in surgery. Pt started yelling

## 2022-08-31 ENCOUNTER — TELEPHONE (OUTPATIENT)
Dept: ORTHOPEDICS | Facility: CLINIC | Age: 32
End: 2022-08-31
Payer: MEDICAID

## 2022-10-03 ENCOUNTER — OFFICE VISIT (OUTPATIENT)
Dept: ORTHOPEDICS | Facility: CLINIC | Age: 32
End: 2022-10-03
Payer: MEDICAID

## 2022-10-03 VITALS
HEIGHT: 62 IN | SYSTOLIC BLOOD PRESSURE: 99 MMHG | HEART RATE: 67 BPM | WEIGHT: 239.19 LBS | BODY MASS INDEX: 44.01 KG/M2 | DIASTOLIC BLOOD PRESSURE: 71 MMHG

## 2022-10-03 DIAGNOSIS — M25.561 CHRONIC PAIN OF RIGHT KNEE: Primary | ICD-10-CM

## 2022-10-03 DIAGNOSIS — G89.29 CHRONIC PAIN OF RIGHT KNEE: Primary | ICD-10-CM

## 2022-10-03 PROCEDURE — 3078F DIAST BP <80 MM HG: CPT | Mod: CPTII,,, | Performed by: ORTHOPAEDIC SURGERY

## 2022-10-03 PROCEDURE — 3008F BODY MASS INDEX DOCD: CPT | Mod: CPTII,,, | Performed by: ORTHOPAEDIC SURGERY

## 2022-10-03 PROCEDURE — 99999 PR PBB SHADOW E&M-EST. PATIENT-LVL IV: CPT | Mod: PBBFAC,,, | Performed by: ORTHOPAEDIC SURGERY

## 2022-10-03 PROCEDURE — 1159F MED LIST DOCD IN RCRD: CPT | Mod: CPTII,,, | Performed by: ORTHOPAEDIC SURGERY

## 2022-10-03 PROCEDURE — 3044F HG A1C LEVEL LT 7.0%: CPT | Mod: CPTII,,, | Performed by: ORTHOPAEDIC SURGERY

## 2022-10-03 PROCEDURE — 99203 PR OFFICE/OUTPT VISIT, NEW, LEVL III, 30-44 MIN: ICD-10-PCS | Mod: S$PBB,,, | Performed by: ORTHOPAEDIC SURGERY

## 2022-10-03 PROCEDURE — 3008F PR BODY MASS INDEX (BMI) DOCUMENTED: ICD-10-PCS | Mod: CPTII,,, | Performed by: ORTHOPAEDIC SURGERY

## 2022-10-03 PROCEDURE — 3074F SYST BP LT 130 MM HG: CPT | Mod: CPTII,,, | Performed by: ORTHOPAEDIC SURGERY

## 2022-10-03 PROCEDURE — 99214 OFFICE O/P EST MOD 30 MIN: CPT | Mod: PBBFAC,PN | Performed by: ORTHOPAEDIC SURGERY

## 2022-10-03 PROCEDURE — 1159F PR MEDICATION LIST DOCUMENTED IN MEDICAL RECORD: ICD-10-PCS | Mod: CPTII,,, | Performed by: ORTHOPAEDIC SURGERY

## 2022-10-03 PROCEDURE — 99203 OFFICE O/P NEW LOW 30 MIN: CPT | Mod: S$PBB,,, | Performed by: ORTHOPAEDIC SURGERY

## 2022-10-03 PROCEDURE — 3074F PR MOST RECENT SYSTOLIC BLOOD PRESSURE < 130 MM HG: ICD-10-PCS | Mod: CPTII,,, | Performed by: ORTHOPAEDIC SURGERY

## 2022-10-03 PROCEDURE — 3044F PR MOST RECENT HEMOGLOBIN A1C LEVEL <7.0%: ICD-10-PCS | Mod: CPTII,,, | Performed by: ORTHOPAEDIC SURGERY

## 2022-10-03 PROCEDURE — 99999 PR PBB SHADOW E&M-EST. PATIENT-LVL IV: ICD-10-PCS | Mod: PBBFAC,,, | Performed by: ORTHOPAEDIC SURGERY

## 2022-10-03 PROCEDURE — 3078F PR MOST RECENT DIASTOLIC BLOOD PRESSURE < 80 MM HG: ICD-10-PCS | Mod: CPTII,,, | Performed by: ORTHOPAEDIC SURGERY

## 2022-10-03 RX ORDER — TOPIRAMATE 25 MG/1
25 TABLET ORAL DAILY
COMMUNITY
Start: 2022-09-12 | End: 2023-09-15

## 2022-10-03 RX ORDER — OXYBUTYNIN CHLORIDE 15 MG/1
15 TABLET, EXTENDED RELEASE ORAL DAILY
COMMUNITY
Start: 2022-09-30 | End: 2023-09-15

## 2022-10-03 RX ORDER — DIPHENOXYLATE HYDROCHLORIDE AND ATROPINE SULFATE 2.5; .025 MG/1; MG/1
TABLET ORAL
COMMUNITY
Start: 2022-09-03 | End: 2023-09-15

## 2022-10-03 RX ORDER — BUDESONIDE AND FORMOTEROL FUMARATE DIHYDRATE 160; 4.5 UG/1; UG/1
AEROSOL RESPIRATORY (INHALATION)
COMMUNITY
Start: 2022-09-22 | End: 2023-09-15

## 2022-10-03 RX ORDER — ONDANSETRON 4 MG/1
TABLET, ORALLY DISINTEGRATING ORAL
COMMUNITY
Start: 2022-09-03 | End: 2023-09-15

## 2022-10-03 RX ORDER — VILAZODONE HYDROCHLORIDE 40 MG/1
40 TABLET ORAL DAILY
COMMUNITY
Start: 2022-09-09 | End: 2023-09-15

## 2022-10-11 ENCOUNTER — HOSPITAL ENCOUNTER (OUTPATIENT)
Dept: RADIOLOGY | Facility: HOSPITAL | Age: 32
Discharge: HOME | End: 2022-10-11
Attending: ORTHOPAEDIC SURGERY
Payer: MEDICAID

## 2022-10-11 DIAGNOSIS — G89.29 CHRONIC PAIN OF RIGHT KNEE: ICD-10-CM

## 2022-10-11 DIAGNOSIS — M25.561 CHRONIC PAIN OF RIGHT KNEE: ICD-10-CM

## 2022-10-11 PROCEDURE — 73721 MRI JNT OF LWR EXTRE W/O DYE: CPT | Mod: TC,RT

## 2022-10-11 PROCEDURE — 73721 MRI KNEE WITHOUT CONTRAST RIGHT: ICD-10-PCS | Mod: 26,RT,, | Performed by: RADIOLOGY

## 2022-10-11 PROCEDURE — 73721 MRI JNT OF LWR EXTRE W/O DYE: CPT | Mod: 26,RT,, | Performed by: RADIOLOGY

## 2022-10-17 ENCOUNTER — OFFICE VISIT (OUTPATIENT)
Dept: ORTHOPEDICS | Facility: CLINIC | Age: 32
End: 2022-10-17
Payer: MEDICAID

## 2022-10-17 VITALS
BODY MASS INDEX: 43.56 KG/M2 | DIASTOLIC BLOOD PRESSURE: 71 MMHG | HEIGHT: 62 IN | HEART RATE: 63 BPM | WEIGHT: 236.69 LBS | SYSTOLIC BLOOD PRESSURE: 101 MMHG

## 2022-10-17 DIAGNOSIS — S83.231A COMPLEX TEAR OF MEDIAL MENISCUS OF RIGHT KNEE AS CURRENT INJURY, INITIAL ENCOUNTER: Primary | ICD-10-CM

## 2022-10-17 PROCEDURE — 99214 OFFICE O/P EST MOD 30 MIN: CPT | Mod: S$PBB,,, | Performed by: ORTHOPAEDIC SURGERY

## 2022-10-17 PROCEDURE — 1159F MED LIST DOCD IN RCRD: CPT | Mod: CPTII,,, | Performed by: ORTHOPAEDIC SURGERY

## 2022-10-17 PROCEDURE — 3078F PR MOST RECENT DIASTOLIC BLOOD PRESSURE < 80 MM HG: ICD-10-PCS | Mod: CPTII,,, | Performed by: ORTHOPAEDIC SURGERY

## 2022-10-17 PROCEDURE — 99999 PR PBB SHADOW E&M-EST. PATIENT-LVL V: ICD-10-PCS | Mod: PBBFAC,,, | Performed by: ORTHOPAEDIC SURGERY

## 2022-10-17 PROCEDURE — 3074F SYST BP LT 130 MM HG: CPT | Mod: CPTII,,, | Performed by: ORTHOPAEDIC SURGERY

## 2022-10-17 PROCEDURE — 3044F HG A1C LEVEL LT 7.0%: CPT | Mod: CPTII,,, | Performed by: ORTHOPAEDIC SURGERY

## 2022-10-17 PROCEDURE — 99214 PR OFFICE/OUTPT VISIT, EST, LEVL IV, 30-39 MIN: ICD-10-PCS | Mod: S$PBB,,, | Performed by: ORTHOPAEDIC SURGERY

## 2022-10-17 PROCEDURE — 1159F PR MEDICATION LIST DOCUMENTED IN MEDICAL RECORD: ICD-10-PCS | Mod: CPTII,,, | Performed by: ORTHOPAEDIC SURGERY

## 2022-10-17 PROCEDURE — 3078F DIAST BP <80 MM HG: CPT | Mod: CPTII,,, | Performed by: ORTHOPAEDIC SURGERY

## 2022-10-17 PROCEDURE — 3074F PR MOST RECENT SYSTOLIC BLOOD PRESSURE < 130 MM HG: ICD-10-PCS | Mod: CPTII,,, | Performed by: ORTHOPAEDIC SURGERY

## 2022-10-17 PROCEDURE — 99999 PR PBB SHADOW E&M-EST. PATIENT-LVL V: CPT | Mod: PBBFAC,,, | Performed by: ORTHOPAEDIC SURGERY

## 2022-10-17 PROCEDURE — 3044F PR MOST RECENT HEMOGLOBIN A1C LEVEL <7.0%: ICD-10-PCS | Mod: CPTII,,, | Performed by: ORTHOPAEDIC SURGERY

## 2022-10-17 PROCEDURE — 99215 OFFICE O/P EST HI 40 MIN: CPT | Mod: PBBFAC,PN | Performed by: ORTHOPAEDIC SURGERY

## 2022-10-17 RX ORDER — BUTALBITAL AND ACETAMINOPHEN 325; 50 MG/1; MG/1
1 TABLET ORAL
COMMUNITY
End: 2023-09-15

## 2022-10-17 RX ORDER — MUPIROCIN 20 MG/G
OINTMENT TOPICAL
Status: CANCELLED | OUTPATIENT
Start: 2022-10-17

## 2022-10-17 RX ORDER — SODIUM CHLORIDE 9 MG/ML
INJECTION, SOLUTION INTRAVENOUS CONTINUOUS
Status: CANCELLED | OUTPATIENT
Start: 2022-10-17

## 2022-10-17 RX ORDER — IBUPROFEN 200 MG
TABLET ORAL
COMMUNITY
Start: 2022-09-13 | End: 2023-09-15

## 2022-10-17 RX ORDER — CEFAZOLIN SODIUM 2 G/50ML
2 SOLUTION INTRAVENOUS
Status: CANCELLED | OUTPATIENT
Start: 2022-10-17

## 2022-10-17 NOTE — PROGRESS NOTES
Patient ID: Cortez Radford is a 31 y.o. female    Chief Complaint:   Chief Complaint   Patient presents with    Right Knee - Pain       History of Present Illness:    Cortez Radford is a pleasant 31 y.o. female who presents for evaluation of right knee pain. She  reports pain for the past 6 months. She does endorse a history of trauma.  She sustained a fall onto her right knee back in May.  She had x-rays at that time which were negative.  Prolonged walking, sitting to standing and squats makes it worse and nothing makes it better. The pain is mostly medial and posterior. She does endorse nighttime pain.   She is independent with all activities of daily living.     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  They report no changes since I saw them last.  She did have a repeat fall a few days ago.  Continues to have mechanical symptoms and pain mostly medial and posterior.          PAST MEDICAL HISTORY:   Past Medical History:   Diagnosis Date    Allergy     Anxiety disorder, unspecified     Depression     GERD (gastroesophageal reflux disease)     Thyroid dysfunction      PAST SURGICAL HISTORY:   Past Surgical History:   Procedure Laterality Date    ADENOIDECTOMY      CHOLECYSTECTOMY      TONSILLECTOMY       FAMILY HISTORY:   Family History   Problem Relation Age of Onset    Cancer Mother         ovarian cancer    Hypertension Father     Diabetes Father      SOCIAL HISTORY:   Social History     Occupational History    Not on file   Tobacco Use    Smoking status: Every Day     Packs/day: 1.00     Types: Cigarettes     Start date: 6/8/2014    Smokeless tobacco: Never   Substance and Sexual  Activity    Alcohol use: Yes     Comment: once every few months    Drug use: Yes     Frequency: 1.0 times per week     Types: Marijuana     Comment: once a week    Sexual activity: Yes     Partners: Male     Comment: has 20 month old baby        MEDICATIONS:   Current Outpatient Medications:     ARIPiprazole (ABILIFY) 2 MG Tab, , Disp: , Rfl:     budesonide-formoterol 160-4.5 mcg (SYMBICORT) 160-4.5 mcg/actuation HFAA, Inhale into the lungs., Disp: , Rfl:     buPROPion (WELLBUTRIN XL) 150 MG TB24 tablet, , Disp: , Rfl:     butalbital-acetaminophen-caffeine -40 mg (FIORICET, ESGIC) -40 mg per tablet, TAKE 1 TABLET BY MOUTH EVERY 4 HOURS AS NEEDED FOR PAIN AND OR HEADACHES, Disp: 30 tablet, Rfl: 0    diclofenac sodium (VOLTAREN) 1 % Gel, Apply 2 g topically 4 (four) times daily., Disp: 100 g, Rfl: 0    diphenoxylate-atropine 2.5-0.025 mg (LOMOTIL) 2.5-0.025 mg per tablet, TAKE 1 TABLET BY MOUTH TWICE DAILY AS NEEDED FOR DIARRHEA, Disp: , Rfl:     lamoTRIgine (LAMICTAL) 25 MG tablet, , Disp: , Rfl:     loratadine (CLARITIN) 10 mg tablet, Take 10 mg by mouth once daily., Disp: , Rfl:     mirabegron (MYRBETRIQ) 25 mg Tb24 ER tablet, Take 1 tablet (25 mg total) by mouth once daily., Disp: 30 tablet, Rfl: 11    nicotine (NICODERM CQ) 21 mg/24 hr, 1 patch to skin, Disp: , Rfl:     ondansetron (ZOFRAN-ODT) 4 MG TbDL, DISSOLVE 1 TABLET IN MOUTH EVERY 6 TO 8 HOURS AS NEEDED FOR NAUSEA, Disp: , Rfl:     OXcarbazepine (TRILEPTAL) 150 MG Tab, Take 150 mg by mouth 2 (two) times daily., Disp: , Rfl:     oxybutynin (DITROPAN XL) 15 MG TR24, Take 15 mg by mouth once daily., Disp: , Rfl:     pantoprazole (PROTONIX) 40 MG tablet, Take 1 tablet (40 mg total) by mouth once daily., Disp: 30 tablet, Rfl: 2    predniSONE (DELTASONE) 20 MG tablet, , Disp: , Rfl:     topiramate (TOPAMAX) 25 MG tablet, Take 25 mg by mouth once daily., Disp: , Rfl:     vilazodone (VIIBRYD) 40 mg Tab tablet, Take 40 mg by mouth once daily., Disp: ,  Rfl:     butalbitaL-acetaminophen  mg Tab, 1 tablet as needed., Disp: , Rfl:   ALLERGIES: Review of patient's allergies indicates:  No Known Allergies      Physical Exam     Vitals:    10/17/22 1036   BP: 101/71   Pulse: 63     Alert and oriented to person, place and time. No acute distress. Well-groomed, not ill appearing. Pupils round and reactive, normal respiratory effort, no audible wheezing.     OBSERVATION / INSPECTION   Gait:   Nonantalgic   Alignment:  Neutral   Scars:   None   Muscle atrophy: None  Effusion:  Positive  Warmth:  None   Discoloration:   None     TENDERNESS                 Patella     Negative    Peripatellar medial    positive   Peripatellar lateral   Negative   Patellar tendon   Negative   Quad tendon     Negative    Prepatellar Bursa   Negative     Tibial tubercle    Negative    Pes anserine/HS   Negative      ITB     Negative      LCL     Negative  MFC     Negative  LFC     Negative  MCL      Negative  Medial Joint Line    positive   Lateral Joint Line   Negative  Quadriceps    Negative  Hamstring     Negative            Range of  Motion:          Right :    0-130°      Patellofemoral examination:     Patella position    Centered  Crepitus (PF):    Absent   Lateral tilt:    Normal   J-sign:     None   Patellofemoral grind:   No pain       MENISCAL SIGNS:     Pain on terminal extension:  Positive  Pain on terminal flexion:  Positive  Ibrahimas maneuver:  Positive medial    LIGAMENT EXAMINATION:  ACL / Lachman:  normal   PCL-Post.  drawer: normal 0 to 2mm  MCL- Valgus:  normal 0 to 2mm  LCL- Varus:    normal 0 to 2mm    Dial Test: difference c/w other side  At 30° flexion: normal (< 5°)   At 90° flexion: normal (< 5°)       STRENGTH: (* = with pain)   Quadricep   5/5  Hamstrin/5    EXTREMITY NEURO-VASCULAR EXAMINATION:   Sensation:  Grossly intact to light touch all dermatomal regions.   Motor Function:  Fully intact motor function at hip, knee, foot and ankle    DTRs;   quadriceps and  achilles 2+.  No clonus and downgoing Babinski.    Vascular status:  DP and PT pulses 2+, brisk capillary refill, symmetric.          Imaging:     3 view  right knee X-rays ordered/reviewed by me showing no evidence of fracture or dislocation. There is no obvious malalignment. No evidence of masses, lesions or foreign bodies.     MRI reviewed of the right knee showing complex tear of the posterior horn and root of the medial meniscus          Assessment & Plan    Complex tear of medial meniscus of right knee as current injury, initial encounter         Treatment options were discussed with her in detail.  She has right knee pain for the past 6 months after a traumatic fall.  She has attempted bracing, anti-inflammatories and home exercise program without any relief.  She has mechanical symptoms and sharp stabbing pain mostly on the medial aspect of her knee.  MRI consistent with posterior horn and root tear of the medial meniscus.  We discussed options for this including surgical intervention.  Based on the complexity of the tear as well as the root attachment involvement, I do think this will require surgical intervention with repair to prevent advanced osteoarthritis and progression.  Discussed rehabilitation associated with surgery as well as expectations of surgery.  We will get this done next week.      _________________________________________________________________      Diagnosis and findings were discussed with her in lay terms. I discussed the findings and treatment options with them at length. Questions were actively sought and all questions were answered to their apparent satisfaction. We discussed the risks and benefits of surgery. We reviewed the operative indications surgical technique and potential rehabilitation involved. Risks including, but not limited to pain, bleeding, infection, damage surrounding neurovascular structures, and other soft tissues, decreased range of motion,  instability, poor cosmetic result, scarring/painful scars, poor functional result, reflex sympathetic dystrophy. We discussed as well the risk of anesthesia, DVT/PE and possible need for additional surgical intervention in lay terms. Despite the risks as explained to them, they wished to proceed with surgery. She expressed understanding and agreement with the treatment plan and understand that no guarantee of outcome is implied or expressed given.       Cortez Radford will be scheduled for the following procedure(s):     Right knee arthroscopy with medial meniscus root repair versus debridement  Right knee arthroscopy with possible chondroplasty  All other indicated procedures      Post-Op Medications to be prescribed:   Percocet 5/325mg Take 1-2 tablets every 4-6 hours PRN pain #28  Zofran 4mg oral disintegrating tablets every 8 hours PRN nausea/vomiting   ASA 81mg twice daily x 2 weeks  Motrin 600 mg TID PRN     DME/Bracing:  Range-of-motion brace  Post-op Physical Therapy to begin: 2 weeks    Medical Clearance:  No  Hx of DVT,PE, anesthetic complications: No    Additional notes/concerns:  None    Opioid Disclosure  Today we discussed the reasons why the prescription is necessary as well as: the risks of addiction and overdose associated with opioid drugs and the dangers of taking opioid drugs with alcohol, benzodiazepines and other central nervous system depressants; alternative treatments that may be available; the risks associated with the use of the drugs being prescribed, specifically that opioids are highly addictive, even when taken as prescribed, that there is a risk of developing a physical or psychological dependence on the controlled dangerous substance, and that the risks of taking more opioids than prescribed or mixing sedatives, benzodiazepines or alcohol with opioids can result in fatal respiratory depression.    Follow up:  Postop visit

## 2022-10-18 ENCOUNTER — HOSPITAL ENCOUNTER (OUTPATIENT)
Dept: SLEEP MEDICINE | Facility: HOSPITAL | Age: 32
Discharge: HOME | End: 2022-10-18
Attending: INTERNAL MEDICINE
Payer: MEDICAID

## 2022-10-18 DIAGNOSIS — G47.33 OBSTRUCTIVE SLEEP APNEA (ADULT) (PEDIATRIC): Primary | ICD-10-CM

## 2022-10-18 PROCEDURE — 95810 POLYSOM 6/> YRS 4/> PARAM: CPT

## 2022-10-24 DIAGNOSIS — G89.18 POST-OP PAIN: Primary | ICD-10-CM

## 2022-10-24 DIAGNOSIS — S83.231A COMPLEX TEAR OF MEDIAL MENISCUS OF RIGHT KNEE AS CURRENT INJURY, INITIAL ENCOUNTER: ICD-10-CM

## 2022-10-24 RX ORDER — ONDANSETRON 4 MG/1
4 TABLET, ORALLY DISINTEGRATING ORAL
Qty: 15 TABLET | Refills: 0 | Status: SHIPPED | OUTPATIENT
Start: 2022-10-24 | End: 2023-09-15

## 2022-10-24 RX ORDER — METHOCARBAMOL 500 MG/1
500 TABLET, FILM COATED ORAL 4 TIMES DAILY
Qty: 40 TABLET | Refills: 0 | OUTPATIENT
Start: 2022-10-24 | End: 2022-10-24

## 2022-10-24 RX ORDER — IBUPROFEN 600 MG/1
600 TABLET ORAL 3 TIMES DAILY
Qty: 90 TABLET | Refills: 0 | OUTPATIENT
Start: 2022-10-24 | End: 2022-10-24

## 2022-10-24 RX ORDER — ASPIRIN 81 MG/1
81 TABLET ORAL 2 TIMES DAILY
Qty: 28 TABLET | Refills: 0 | OUTPATIENT
Start: 2022-10-24 | End: 2022-10-24

## 2022-10-27 NOTE — TELEPHONE ENCOUNTER
----- Message from Nikki Jacobo LPN sent at 10/27/2022  2:22 PM CDT -----  Regarding: FW: Pt medication  Contact: [facility]    ----- Message -----  From: Yulisa Soto, Patient Care Assistant  Sent: 10/27/2022   1:23 PM CDT  To: [facility] Staff  Subject: Pt medication                                    Pt is requesting a call back in regards to pt's medication (oxyCODONE-acetaminophen (PERCOCET) 5-325 mg per tablet) Pt pharmacy is requesting to speak with physician in regards to this matter.      [facility] @ [phone]

## 2022-10-27 NOTE — TELEPHONE ENCOUNTER
Spoke with the pharmacist at [facility]. Percocet is on back order. Norco 5mg is in stock. If would like to change a new order needs to be sent in per [facility]. No further questions or concerns noted.

## 2022-10-28 RX ORDER — HYDROCODONE BITARTRATE AND ACETAMINOPHEN 5; 325 MG/1; MG/1
1 TABLET ORAL EVERY 6 HOURS PRN
Qty: 28 TABLET | Refills: 0 | Status: SHIPPED | OUTPATIENT
Start: 2022-10-28 | End: 2023-09-15

## 2022-11-02 ENCOUNTER — CLINICAL SUPPORT (OUTPATIENT)
Dept: REHABILITATION | Facility: HOSPITAL | Age: 32
End: 2022-11-02
Attending: NURSE PRACTITIONER
Payer: MEDICAID

## 2022-11-02 DIAGNOSIS — S83.231A COMPLEX TEAR OF MEDIAL MENISCUS OF RIGHT KNEE AS CURRENT INJURY, INITIAL ENCOUNTER: ICD-10-CM

## 2022-11-02 PROCEDURE — 97162 PT EVAL MOD COMPLEX 30 MIN: CPT | Mod: PN

## 2022-11-02 PROCEDURE — 97110 THERAPEUTIC EXERCISES: CPT | Mod: PN

## 2022-11-02 NOTE — PROGRESS NOTES
Name: Cortez Radford  [facility] Number: [id number]    Cortez is a 31 y.o. female evaluated on 11/02/2022.     Diagnosis:   Encounter Diagnosis   Name Primary?    Complex tear of medial meniscus of right knee as current injury, initial encounter      Physician: Leila Mccullough, *  Treatment Orders: PT Eval and Treat    Past Medical History:   Diagnosis Date    Allergy     Anxiety disorder, unspecified     Depression     GERD (gastroesophageal reflux disease)     Thyroid dysfunction      Current Outpatient Medications   Medication Sig    ARIPiprazole (ABILIFY) 2 MG Tab     aspirin (ECOTRIN) 81 MG EC tablet Take 1 tablet (81 mg total) by mouth once daily. for 14 days    budesonide-formoterol 160-4.5 mcg (SYMBICORT) 160-4.5 mcg/actuation HFAA Inhale into the lungs.    buPROPion (WELLBUTRIN XL) 150 MG TB24 tablet     butalbitaL-acetaminophen  mg Tab 1 tablet as needed.    butalbital-acetaminophen-caffeine -40 mg (FIORICET, ESGIC) -40 mg per tablet TAKE 1 TABLET BY MOUTH EVERY 4 HOURS AS NEEDED FOR PAIN AND OR HEADACHES (Patient not taking: Reported on 10/18/2022)    diclofenac sodium (VOLTAREN) 1 % Gel Apply 2 g topically 4 (four) times daily.    diphenoxylate-atropine 2.5-0.025 mg (LOMOTIL) 2.5-0.025 mg per tablet TAKE 1 TABLET BY MOUTH TWICE DAILY AS NEEDED FOR DIARRHEA    HYDROcodone-acetaminophen (NORCO) 5-325 mg per tablet Take 1 tablet by mouth every 6 (six) hours as needed for Pain.    ibuprofen (ADVIL,MOTRIN) 600 MG tablet Take 1 tablet (600 mg total) by mouth 3 (three) times daily.    lamoTRIgine (LAMICTAL) 25 MG tablet     loratadine (CLARITIN) 10 mg tablet Take 10 mg by mouth once daily.    mirabegron (MYRBETRIQ) 25 mg Tb24 ER tablet Take 1 tablet (25 mg total) by mouth once daily.    nicotine (NICODERM CQ) 21 mg/24 hr 1 patch to skin    ondansetron (ZOFRAN) 4 MG tablet Take 1 tablet (4 mg total) by mouth 2 (two) times daily.    ondansetron (ZOFRAN-ODT) 4 MG TbDL DISSOLVE 1 TABLET IN MOUTH  EVERY 6 TO 8 HOURS AS NEEDED FOR NAUSEA    ondansetron (ZOFRAN-ODT) 4 MG TbDL Take 1 tablet (4 mg total) by mouth as needed.    OXcarbazepine (TRILEPTAL) 150 MG Tab Take 150 mg by mouth 2 (two) times daily.    oxybutynin (DITROPAN XL) 15 MG TR24 Take 15 mg by mouth once daily.    oxyCODONE-acetaminophen (PERCOCET) 5-325 mg per tablet Take 1-2 tablets by mouth every 4 (four) hours as needed for Pain.    oxyCODONE-acetaminophen (PERCOCET) 5-325 mg per tablet Take 1 tablet by mouth every 4 (four) hours as needed for Pain.    pantoprazole (PROTONIX) 40 MG tablet Take 1 tablet (40 mg total) by mouth once daily.    topiramate (TOPAMAX) 25 MG tablet Take 25 mg by mouth once daily.    vilazodone (VIIBRYD) 40 mg Tab tablet Take 40 mg by mouth once daily.     No current facility-administered medications for this visit.     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       Subjective  Onset: pt received surgery last Monday 10/24 to R knee from Dr. Mccullough. She has been TTWBing since and having difficult time ambulating with crutches. She states she has had a couple of near falls due to use of crutches.   Involved Area/Location: right knee  Current Symptoms: pain and swelling  Increases symptoms: walking, pressing into extension.  Decreases Symptoms: relaxation, lying down, and heating pad    Diagnostic Tests: Radiographs and MRI    Pain Scale: Cortez rates pain to be 4 on a scale of 1-10. Up to 7/10 with exercises  Patient Goals: get back to work, sole provider for family. Learn crutches in the next week.       Objective  Observation: ambulating NWBing with significant lean on crutches in B axillas, pt reports bruising present    Right Knee: incisions present and covered, no redness noted    Range of Motion: Knee   Left Right   Flexion:  WNL 60 deg in supine, 90 deg in sitting   Extension WNL -12 deg     Strength: Knee   Left Right   Quadriceps 5/5 3/5, no resistancegiven at this time, but able to maintain SAQ for several minutes   Hamstrings 5/5 3+/5         Joint Mobility: hypomobile  Palpation: tendernes noted in medial aspect and hamstring insertion  Sensation: intact to light touch    Edema:    Left: absent  Right: minimal    Girth   Left Right   10 cm above joint line 41 cm 42 cm   Joint Line 40 cm 40 cm   10 cm below joint line 47 cm 46 cm       Gait: Foret ambulated 50' feet with auxillary crutches.  Level of Assistance: independent  Patient displays poor gait mechanics, dependent on axilla support on axillary crutches  Balance: good balance strategies.      Treatment:  Time In: 1300  Time Out: 1345    PT Evaluation Completed? Yes  Discussed Plan of Care with patient: Yes    Cortez received 45 minutes of moderate complex eval, gait training, and therapuetic exercises.     Written Home Exercises Provided: including quad sets on towel, seated knee flexion, ankle ROM  Cortez demo good understanding of the education provided. Patient demo good return demo of skill of exercises.      Assessment  This is a 31 y.o. female referred to outpatient physical therapy and presents with a medical diagnosis of   Encounter Diagnosis   Name Primary?    Complex tear of medial meniscus of right knee as current injury, initial encounter     and demonstrates limitations as described in the problem list. Pt will benefit from physcial therapy services in order to maximize pain free and/or functional use of right knee. The following goals were discussed with the patient and patient is in agreement with them as to be addressed in the treatment plan.     Problem List: pain, decreased ROM, decreased flexibility, decreased strength, antalgic gait, and impaired use of axillary crutches.    Short Term Goals:  1 weeks  1. Pt will demonstrate increased knee flexion AROM to 90  degrees.  2. Pt will demonstrate increased knee extension AROM to -5 degrees.  3. Pt will ambulate with axillary crutches >1000' with improved technique and decrease pressure in axilla area.   4. Pt will demonstrate increased ham strength to 4/5.    Long Term Goals: 8 weeks  Pt will demonstrate R knee ROM 0-120 deg to improve ability to get in/out of car.   2.   Pt will ambulate without a device on level/unlevel surfaces > 1000' without increase in pain and demonstrate normalized gait pattern.   3.   Pt will demonstrate improved  R knee strength as evident by being able to ascend/descend stairs with reciprocal gait pattern.   4.  Pt will be have minimal complaints of increased pain with prolonged standing activities, such as cooking.   5. Pt will be independent with HEP and self management of symptoms.     Plan  Pt will be treated by physical therapy 2 times a week for 8 weeks for theraputetic exercises,progressive stretching, weight bearing, and strengthening, and neuromuscular eduction to achieve established goals. Cortez may at times be seen by a PTA as part of the Rehab Team.       I certify the need for these services furnished under this plan of treatment and while under my care.         ___________________________________  Physician/Referring Practitioner        _________________  Date of Signature

## 2022-11-02 NOTE — PROGRESS NOTES
Name: Cortez Radford  [facility] Number: [id number]    Cortez is a 31 y.o. female evaluated on 11/02/2022.     Diagnosis:   Encounter Diagnosis   Name Primary?    Complex tear of medial meniscus of right knee as current injury, initial encounter      Physician: Leila Mccullough, *  Treatment Orders: PT Eval and Treat    Past Medical History:   Diagnosis Date    Allergy     Anxiety disorder, unspecified     Depression     GERD (gastroesophageal reflux disease)     Thyroid dysfunction      Current Outpatient Medications   Medication Sig    ARIPiprazole (ABILIFY) 2 MG Tab     aspirin (ECOTRIN) 81 MG EC tablet Take 1 tablet (81 mg total) by mouth once daily. for 14 days    budesonide-formoterol 160-4.5 mcg (SYMBICORT) 160-4.5 mcg/actuation HFAA Inhale into the lungs.    buPROPion (WELLBUTRIN XL) 150 MG TB24 tablet     butalbitaL-acetaminophen  mg Tab 1 tablet as needed.    butalbital-acetaminophen-caffeine -40 mg (FIORICET, ESGIC) -40 mg per tablet TAKE 1 TABLET BY MOUTH EVERY 4 HOURS AS NEEDED FOR PAIN AND OR HEADACHES (Patient not taking: Reported on 10/18/2022)    diclofenac sodium (VOLTAREN) 1 % Gel Apply 2 g topically 4 (four) times daily.    diphenoxylate-atropine 2.5-0.025 mg (LOMOTIL) 2.5-0.025 mg per tablet TAKE 1 TABLET BY MOUTH TWICE DAILY AS NEEDED FOR DIARRHEA    HYDROcodone-acetaminophen (NORCO) 5-325 mg per tablet Take 1 tablet by mouth every 6 (six) hours as needed for Pain.    ibuprofen (ADVIL,MOTRIN) 600 MG tablet Take 1 tablet (600 mg total) by mouth 3 (three) times daily.    lamoTRIgine (LAMICTAL) 25 MG tablet     loratadine (CLARITIN) 10 mg tablet Take 10 mg by mouth once daily.    mirabegron (MYRBETRIQ) 25 mg Tb24 ER tablet Take 1 tablet (25 mg total) by mouth once daily.    nicotine (NICODERM CQ) 21 mg/24 hr 1 patch to skin    ondansetron (ZOFRAN) 4 MG tablet Take 1 tablet (4 mg total) by mouth 2 (two) times daily.    ondansetron (ZOFRAN-ODT) 4 MG TbDL DISSOLVE 1 TABLET IN MOUTH  EVERY 6 TO 8 HOURS AS NEEDED FOR NAUSEA    ondansetron (ZOFRAN-ODT) 4 MG TbDL Take 1 tablet (4 mg total) by mouth as needed.    OXcarbazepine (TRILEPTAL) 150 MG Tab Take 150 mg by mouth 2 (two) times daily.    oxybutynin (DITROPAN XL) 15 MG TR24 Take 15 mg by mouth once daily.    oxyCODONE-acetaminophen (PERCOCET) 5-325 mg per tablet Take 1-2 tablets by mouth every 4 (four) hours as needed for Pain.    oxyCODONE-acetaminophen (PERCOCET) 5-325 mg per tablet Take 1 tablet by mouth every 4 (four) hours as needed for Pain.    pantoprazole (PROTONIX) 40 MG tablet Take 1 tablet (40 mg total) by mouth once daily.    topiramate (TOPAMAX) 25 MG tablet Take 25 mg by mouth once daily.    vilazodone (VIIBRYD) 40 mg Tab tablet Take 40 mg by mouth once daily.     No current facility-administered medications for this visit.     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       Subjective  Onset: pt received surgery last Monday 10/24 to R knee from Dr. Mccullough. She has been TTWBing since and having difficult time ambulating with crutches. She states she has had a couple of near falls due to use of crutches.   Involved Area/Location: right knee  Current Symptoms: pain and swelling  Increases symptoms: walking, pressing into extension.  Decreases Symptoms: relaxation, lying down, and heating pad    Diagnostic Tests: Radiographs and MRI    Pain Scale: Cortez rates pain to be 4 on a scale of 1-10. Up to 7/10 with exercises  Patient Goals: get back to work, sole provider for family. Learn crutches in the next week.       Objective  Observation: ambulating NWBing with significant lean on crutches in B axillas, pt reports bruising present    Right Knee: incisions present and covered, no redness noted    Range of Motion: Knee   Left Right   Flexion:  WNL 60 deg in supine, 90 deg in sitting   Extension WNL -12 deg     Strength: Knee   Left Right   Quadriceps 5/5 3/5, no resistancegiven at this time, but able to maintain SAQ for several minutes   Hamstrings 5/5 3+/5         Joint Mobility: hypomobile  Palpation: tendernes noted in medial aspect and hamstring insertion  Sensation: intact to light touch    Edema:    Left: absent  Right: minimal    Girth   Left Right   10 cm above joint line 41 cm 42 cm   Joint Line 40 cm 40 cm   10 cm below joint line 47 cm 46 cm       Gait: Foret ambulated 50' feet with auxillary crutches.  Level of Assistance: independent  Patient displays poor gait mechanics, dependent on axilla support on axillary crutches  Balance: good balance strategies.      Treatment:  Time In: 1300  Time Out: 1345    PT Evaluation Completed? Yes  Discussed Plan of Care with patient: Yes    Cortez received 45 minutes of moderate complex eval, gait training, and therapuetic exercises.     Written Home Exercises Provided: including quad sets on towel, seated knee flexion, ankle ROM  Cortez demo good understanding of the education provided. Patient demo good return demo of skill of exercises.      Assessment  This is a 31 y.o. female referred to outpatient physical therapy and presents with a medical diagnosis of   Encounter Diagnosis   Name Primary?    Complex tear of medial meniscus of right knee as current injury, initial encounter     and demonstrates limitations as described in the problem list. Pt will benefit from physcial therapy services in order to maximize pain free and/or functional use of right knee. The following goals were discussed with the patient and patient is in agreement with them as to be addressed in the treatment plan.     Problem List: pain, decreased ROM, decreased flexibility, decreased strength, antalgic gait, and impaired use of axillary crutches.    Short Term Goals:  1 weeks  1. Pt will demonstrate increased knee flexion AROM to 90  degrees.  2. Pt will demonstrate increased knee extension AROM to -5 degrees.  3. Pt will ambulate with axillary crutches >1000' with improved technique and decrease pressure in axilla area.   4. Pt will demonstrate increased ham strength to 4/5.    Long Term Goals: 8 weeks  Pt will demonstrate R knee ROM 0-120 deg to improve ability to get in/out of car.   2.   Pt will ambulate without a device on level/unlevel surfaces > 1000' without increase in pain and demonstrate normalized gait pattern.   3.   Pt will demonstrate improved  R knee strength as evident by being able to ascend/descend stairs with reciprocal gait pattern.   4.  Pt will be have minimal complaints of increased pain with prolonged standing activities, such as cooking.   5. Pt will be independent with HEP and self management of symptoms.     Plan  Pt will be treated by physical therapy 2 times a week for 8 weeks for theraputetic exercises,progressive stretching, weight bearing, and strengthening, and neuromuscular eduction to achieve established goals. Cortez may at times be seen by a PTA as part of the Rehab Team.       I certify the need for these services furnished under this plan of treatment and while under my care.         ___________________________________  Physician/Referring Practitioner        _________________  Date of Signature

## 2022-11-02 NOTE — PLAN OF CARE
Name: Cortez Radford  [facility] Number: [id number]    Cortez is a 31 y.o. female evaluated on 11/02/2022.     Diagnosis:   Encounter Diagnosis   Name Primary?    Complex tear of medial meniscus of right knee as current injury, initial encounter      Physician: Leila Mccullough, *  Treatment Orders: PT Eval and Treat    Past Medical History:   Diagnosis Date    Allergy     Anxiety disorder, unspecified     Depression     GERD (gastroesophageal reflux disease)     Thyroid dysfunction      Current Outpatient Medications   Medication Sig    ARIPiprazole (ABILIFY) 2 MG Tab     aspirin (ECOTRIN) 81 MG EC tablet Take 1 tablet (81 mg total) by mouth once daily. for 14 days    budesonide-formoterol 160-4.5 mcg (SYMBICORT) 160-4.5 mcg/actuation HFAA Inhale into the lungs.    buPROPion (WELLBUTRIN XL) 150 MG TB24 tablet     butalbitaL-acetaminophen  mg Tab 1 tablet as needed.    butalbital-acetaminophen-caffeine -40 mg (FIORICET, ESGIC) -40 mg per tablet TAKE 1 TABLET BY MOUTH EVERY 4 HOURS AS NEEDED FOR PAIN AND OR HEADACHES (Patient not taking: Reported on 10/18/2022)    diclofenac sodium (VOLTAREN) 1 % Gel Apply 2 g topically 4 (four) times daily.    diphenoxylate-atropine 2.5-0.025 mg (LOMOTIL) 2.5-0.025 mg per tablet TAKE 1 TABLET BY MOUTH TWICE DAILY AS NEEDED FOR DIARRHEA    HYDROcodone-acetaminophen (NORCO) 5-325 mg per tablet Take 1 tablet by mouth every 6 (six) hours as needed for Pain.    ibuprofen (ADVIL,MOTRIN) 600 MG tablet Take 1 tablet (600 mg total) by mouth 3 (three) times daily.    lamoTRIgine (LAMICTAL) 25 MG tablet     loratadine (CLARITIN) 10 mg tablet Take 10 mg by mouth once daily.    mirabegron (MYRBETRIQ) 25 mg Tb24 ER tablet Take 1 tablet (25 mg total) by mouth once daily.    nicotine (NICODERM CQ) 21 mg/24 hr 1 patch to skin    ondansetron (ZOFRAN) 4 MG tablet Take 1 tablet (4 mg total) by mouth 2 (two) times daily.    ondansetron (ZOFRAN-ODT) 4 MG TbDL DISSOLVE 1 TABLET IN MOUTH  EVERY 6 TO 8 HOURS AS NEEDED FOR NAUSEA    ondansetron (ZOFRAN-ODT) 4 MG TbDL Take 1 tablet (4 mg total) by mouth as needed.    OXcarbazepine (TRILEPTAL) 150 MG Tab Take 150 mg by mouth 2 (two) times daily.    oxybutynin (DITROPAN XL) 15 MG TR24 Take 15 mg by mouth once daily.    oxyCODONE-acetaminophen (PERCOCET) 5-325 mg per tablet Take 1-2 tablets by mouth every 4 (four) hours as needed for Pain.    oxyCODONE-acetaminophen (PERCOCET) 5-325 mg per tablet Take 1 tablet by mouth every 4 (four) hours as needed for Pain.    pantoprazole (PROTONIX) 40 MG tablet Take 1 tablet (40 mg total) by mouth once daily.    topiramate (TOPAMAX) 25 MG tablet Take 25 mg by mouth once daily.    vilazodone (VIIBRYD) 40 mg Tab tablet Take 40 mg by mouth once daily.     No current facility-administered medications for this visit.     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       Subjective  Onset: pt received surgery last Monday 10/24 to R knee from Dr. Mccullough. She has been TTWBing since and having difficult time ambulating with crutches. She states she has had a couple of near falls due to use of crutches.   Involved Area/Location: right knee  Current Symptoms: pain and swelling  Increases symptoms: walking, pressing into extension.  Decreases Symptoms: relaxation, lying down, and heating pad    Diagnostic Tests: Radiographs and MRI    Pain Scale: Cortez rates pain to be 4 on a scale of 1-10. Up to 7/10 with exercises  Patient Goals: get back to work, sole provider for family. Learn crutches in the next week.       Objective  Observation: ambulating NWBing with significant lean on crutches in B axillas, pt reports bruising present    Right Knee: incisions present and covered, no redness noted    Range of Motion: Knee   Left Right   Flexion:  WNL 60 deg in supine, 90 deg in sitting   Extension WNL -12 deg     Strength: Knee   Left Right   Quadriceps 5/5 3/5, no resistancegiven at this time, but able to maintain SAQ for several minutes   Hamstrings 5/5 3+/5         Joint Mobility: hypomobile  Palpation: tendernes noted in medial aspect and hamstring insertion  Sensation: intact to light touch    Edema:    Left: absent  Right: minimal    Girth   Left Right   10 cm above joint line 41 cm 42 cm   Joint Line 40 cm 40 cm   10 cm below joint line 47 cm 46 cm       Gait: Foret ambulated 50' feet with auxillary crutches.  Level of Assistance: independent  Patient displays poor gait mechanics, dependent on axilla support on axillary crutches  Balance: good balance strategies.      Treatment:  Time In: 1300  Time Out: 1345    PT Evaluation Completed? Yes  Discussed Plan of Care with patient: Yes    Cortez received 45 minutes of moderate complex eval, gait training, and therapuetic exercises.     Written Home Exercises Provided: including quad sets on towel, seated knee flexion, ankle ROM  Cortez demo good understanding of the education provided. Patient demo good return demo of skill of exercises.      Assessment  This is a 31 y.o. female referred to outpatient physical therapy and presents with a medical diagnosis of   Encounter Diagnosis   Name Primary?    Complex tear of medial meniscus of right knee as current injury, initial encounter     and demonstrates limitations as described in the problem list. Pt will benefit from physcial therapy services in order to maximize pain free and/or functional use of right knee. The following goals were discussed with the patient and patient is in agreement with them as to be addressed in the treatment plan.     Problem List: pain, decreased ROM, decreased flexibility, decreased strength, antalgic gait, and impaired use of axillary crutches.    Short Term Goals:  1 weeks  1. Pt will demonstrate increased knee flexion AROM to 90  degrees.  2. Pt will demonstrate increased knee extension AROM to -5 degrees.  3. Pt will ambulate with axillary crutches >1000' with improved technique and decrease pressure in axilla area.   4. Pt will demonstrate increased ham strength to 4/5.    Long Term Goals: 8 weeks  Pt will demonstrate R knee ROM 0-120 deg to improve ability to get in/out of car.   2.   Pt will ambulate without a device on level/unlevel surfaces > 1000' without increase in pain and demonstrate normalized gait pattern.   3.   Pt will demonstrate improved  R knee strength as evident by being able to ascend/descend stairs with reciprocal gait pattern.   4.  Pt will be have minimal complaints of increased pain with prolonged standing activities, such as cooking.   5. Pt will be independent with HEP and self management of symptoms.     Plan  Pt will be treated by physical therapy 2 times a week for 8 weeks for theraputetic exercises,progressive stretching, weight bearing, and strengthening, and neuromuscular eduction to achieve established goals. Cortez may at times be seen by a PTA as part of the Rehab Team.       I certify the need for these services furnished under this plan of treatment and while under my care.         ___________________________________  Physician/Referring Practitioner        _________________  Date of Signature

## 2022-11-07 ENCOUNTER — OFFICE VISIT (OUTPATIENT)
Dept: ORTHOPEDICS | Facility: CLINIC | Age: 32
End: 2022-11-07
Payer: MEDICAID

## 2022-11-07 VITALS
SYSTOLIC BLOOD PRESSURE: 114 MMHG | BODY MASS INDEX: 43.67 KG/M2 | DIASTOLIC BLOOD PRESSURE: 73 MMHG | WEIGHT: 238.75 LBS | HEART RATE: 61 BPM

## 2022-11-07 DIAGNOSIS — S83.231A COMPLEX TEAR OF MEDIAL MENISCUS OF RIGHT KNEE AS CURRENT INJURY, INITIAL ENCOUNTER: Primary | ICD-10-CM

## 2022-11-07 PROCEDURE — 3078F PR MOST RECENT DIASTOLIC BLOOD PRESSURE < 80 MM HG: ICD-10-PCS | Mod: CPTII,,, | Performed by: ORTHOPAEDIC SURGERY

## 2022-11-07 PROCEDURE — 3044F HG A1C LEVEL LT 7.0%: CPT | Mod: CPTII,,, | Performed by: ORTHOPAEDIC SURGERY

## 2022-11-07 PROCEDURE — 99999 PR PBB SHADOW E&M-EST. PATIENT-LVL IV: ICD-10-PCS | Mod: PBBFAC,,, | Performed by: ORTHOPAEDIC SURGERY

## 2022-11-07 PROCEDURE — 1159F PR MEDICATION LIST DOCUMENTED IN MEDICAL RECORD: ICD-10-PCS | Mod: CPTII,,, | Performed by: ORTHOPAEDIC SURGERY

## 2022-11-07 PROCEDURE — 3074F SYST BP LT 130 MM HG: CPT | Mod: CPTII,,, | Performed by: ORTHOPAEDIC SURGERY

## 2022-11-07 PROCEDURE — 99024 PR POST-OP FOLLOW-UP VISIT: ICD-10-PCS | Mod: ,,, | Performed by: ORTHOPAEDIC SURGERY

## 2022-11-07 PROCEDURE — 3044F PR MOST RECENT HEMOGLOBIN A1C LEVEL <7.0%: ICD-10-PCS | Mod: CPTII,,, | Performed by: ORTHOPAEDIC SURGERY

## 2022-11-07 PROCEDURE — 99214 OFFICE O/P EST MOD 30 MIN: CPT | Mod: PBBFAC,PN | Performed by: ORTHOPAEDIC SURGERY

## 2022-11-07 PROCEDURE — 3074F PR MOST RECENT SYSTOLIC BLOOD PRESSURE < 130 MM HG: ICD-10-PCS | Mod: CPTII,,, | Performed by: ORTHOPAEDIC SURGERY

## 2022-11-07 PROCEDURE — 99999 PR PBB SHADOW E&M-EST. PATIENT-LVL IV: CPT | Mod: PBBFAC,,, | Performed by: ORTHOPAEDIC SURGERY

## 2022-11-07 PROCEDURE — 3008F PR BODY MASS INDEX (BMI) DOCUMENTED: ICD-10-PCS | Mod: CPTII,,, | Performed by: ORTHOPAEDIC SURGERY

## 2022-11-07 PROCEDURE — 1159F MED LIST DOCD IN RCRD: CPT | Mod: CPTII,,, | Performed by: ORTHOPAEDIC SURGERY

## 2022-11-07 PROCEDURE — 3078F DIAST BP <80 MM HG: CPT | Mod: CPTII,,, | Performed by: ORTHOPAEDIC SURGERY

## 2022-11-07 PROCEDURE — 3008F BODY MASS INDEX DOCD: CPT | Mod: CPTII,,, | Performed by: ORTHOPAEDIC SURGERY

## 2022-11-07 PROCEDURE — 99024 POSTOP FOLLOW-UP VISIT: CPT | Mod: ,,, | Performed by: ORTHOPAEDIC SURGERY

## 2022-11-07 NOTE — PROGRESS NOTES
Post-op Note    HPI    Cortez aRdford is here 2 weeks s/p the following procedure:     Right knee medial meniscus repair    Overall doing well. Pain controlled on current regimen. She is currently enrolled in Physical Therapy. Denies any chest pain or shortness of breathe. Denies any drainage from the incision. Denies any fevers, chills or paresthesias.     DVT Prophylaxis:  Aspirin      Physical Exam:     Patient is alert and oriented no acute distress.   Assistive Device:  Crutches    Portal site Incision(s) are well healed.  There is no evidence of dehiscence.  There is no induration erythema or signs of infection.  Appropriate soft tissue swelling.  Compartments are soft and compressible.  Warm well-perfused extremity.  Painless range of motion 0-90.  Negative Sukh.  Mild effusion    Assessment    Cortez Radford is 2 weeks Post-op     Plan:    Overall doing as expected.  We discussed expectations of surgery and postoperative course.     Continued postoperative pain regimen -- Tylenol ibuprofen as needed  DVT prophylaxis:  Okay to discontinue aspirin  Continue physical therapy (weight bearing status:  Advance weight-bearing over the next 1-2 weeks)    Follow-up: 4 weeks   X-rays next visit:  None

## 2022-11-15 ENCOUNTER — CLINICAL SUPPORT (OUTPATIENT)
Dept: REHABILITATION | Facility: HOSPITAL | Age: 32
End: 2022-11-15
Payer: MEDICAID

## 2022-11-15 DIAGNOSIS — S83.205A COMPLEX TEAR OF MENISCUS OF KNEE AS CURRENT INJURY, UNSPECIFIED LATERALITY, UNSPECIFIED MENISCUS, INITIAL ENCOUNTER: Primary | ICD-10-CM

## 2022-11-15 PROCEDURE — 97110 THERAPEUTIC EXERCISES: CPT | Mod: PN

## 2022-11-15 NOTE — PROGRESS NOTES
[facility]   Physical Therapy Treatment Note     [facility]  Clinic Number: [id number]    Therapy Diagnosis:   Encounter Diagnosis   Name Primary?    Complex tear of meniscus of knee as current injury, unspecified laterality, unspecified meniscus, initial encounter Yes     Physician: Leila Mccullough, *    Visit Date: 11/15/2022    PTA Visit #: 0/5     Time In: 0800  Time Out: 0840  Total Billable Time: 40 minutes    SUBJECTIVE     Pt reports: doing well, has been able to perform some of the exercises. Tolerating increase in Weight bearing well.   She was compliant with home exercise program.  Response to previous treatment: good,   Functional change: walking with crutches and tolerating Wbing     Pain: 0/10  Location: right knee      OBJECTIVE     Objective Measures updated at progress report unless specified.     Treatment     Cortez received the treatments listed below:      therapeutic exercises to develop strength, endurance, ROM, and flexibility for 40 minutes including:  Quad sets 3x10   Straight leg raise 2x10  Straight leg raise with ER 2x10  Short arc quad with ankle pumps 3x10  Heel slides on sliding board for reduced 3x10  Standing heel raises 3x10   Squats 3x10 with cuing for sequencing  Sit to stands x10 from chair height  Sit to stand with R LE back for increase workload x 10  Weight-shifting to L 3x10    manual therapy techniques: Soft tissue Mobilization were applied to the: R knee for 0 minutes, including:      neuromuscular re-education activities to improve: Kinesthetic for 0 minutes. The following activities were included:  -    therapeutic activities to improve functional performance for 0  minutes, including:        gait training to improve functional mobility and safety for 0  minutes, including:            Patient Education and Home Exercises     Home Exercises Provided and Patient Education Provided     Education provided:   - progressed HEP and  discuss importance in home, discuss developing exercise program after recovery    Written Home Exercises Provided: yes. Exercises were reviewed and Cortez was able to demonstrate them prior to the end of the session.  Cortez demonstrated good  understanding of the education provided. See EMR under Patient Instructions for exercises provided during therapy sessions    ASSESSMENT     She continues to be hesitant to walk without toe only Wbing. But demonstrated improved knee ROM.    Cortez Is progressing well towards her goals.   Pt prognosis is Excellent.     Pt will continue to benefit from skilled outpatient physical therapy to address the deficits listed in the problem list box on initial evaluation, provide pt/family education and to maximize pt's level of independence in the home and community environment.     Pt's spiritual, cultural and educational needs considered and pt agreeable to plan of care and goals.     Anticipated barriers to physical therapy: none    Short Term Goals:  1 weeks  1. Pt will demonstrate increased knee flexion AROM to 90 degrees.  2. Pt will demonstrate increased knee extension AROM to -5 degrees.  3. Pt will ambulate with axillary crutches >1000' with improved technique and decrease pressure in axilla area.   4. Pt will demonstrate increased ham strength to 4/5.     Long Term Goals: 8 weeks  Pt will demonstrate R knee ROM 0-120 deg to improve ability to get in/out of car.   2.   Pt will ambulate without a device on level/unlevel surfaces > 1000' without increase in pain and demonstrate normalized gait pattern.   3.   Pt will demonstrate improved  R knee strength as evident by being able to ascend/descend stairs with reciprocal gait pattern.   4.  Pt will be have minimal complaints of increased pain with prolonged standing activities, such as cooking.   5. Pt will be independent with HEP and self management of symptoms.        PLAN     Pt will be treated by physical therapy 2 times a week  for 8 weeks for theraputetic exercises,progressive stretching, weight bearing, and strengthening, and neuromuscular eduction to achieve established goals. Cortez may at times be seen by a PTA as part of the Rehab Team.         I certify the need for these services furnished under this plan of treatment and while under my care.        Pricilla Spencer, PT

## 2022-11-17 ENCOUNTER — CLINICAL SUPPORT (OUTPATIENT)
Dept: REHABILITATION | Facility: HOSPITAL | Age: 32
End: 2022-11-17
Payer: MEDICAID

## 2022-11-17 DIAGNOSIS — S83.203D COMPLEX TEAR OF MENISCUS OF RIGHT KNEE AS CURRENT INJURY, UNSPECIFIED MENISCUS, SUBSEQUENT ENCOUNTER: Primary | ICD-10-CM

## 2022-11-17 PROCEDURE — 97110 THERAPEUTIC EXERCISES: CPT | Mod: PN

## 2022-11-17 NOTE — PROGRESS NOTES
[facility]   Physical Therapy Treatment Note     Name: Cortez Radford  Clinic Number: [id number]    Therapy Diagnosis:   No diagnosis found.    Physician: Leila Mccullough, *    Visit Date: 11/17/2022      PTA Visit #: 0/5     Time In: 0800  Time Out: 0844  Total Billable Time: 44 minutes    SUBJECTIVE     Pt reports: she did her exercises twice yesterday and reports soreness in the hamstring tendons.   She was compliant with home exercise program.  Response to previous treatment: good,   Functional change: walking with crutches and tolerating Wbing   Pain: 2/10  Location: behind right knee      OBJECTIVE     Objective Measures updated at progress report unless specified.     Treatment     Cortez received the treatments listed below:      therapeutic exercises to develop strength, endurance, ROM, and flexibility for 40 minutes including:  Quad sets 3x10   Straight leg raise 3 x 10  Straight leg raise with ER 3 x 10  Short arc quad with ankle pumps 3x10  Heel slides on Right Lower Extremity 3x10  Bridges 3 x 10   Standing heel raises on airex foam 3x10   Squats 3x10 with cuing for sequencing on airex foam  Weight-shifting to R 3 x 10 with L Lower Extremity on step with 3 sec hold.  LE bike at level 3 in the 6 position x 8 min  Not performed today:   Sit to stands x10 from chair height  Sit to stand with R LE back for increase workload x 10  manual therapy techniques: Soft tissue Mobilization were applied to the: R knee for 0 minutes, including:      neuromuscular re-education activities to improve: Kinesthetic for 0 minutes. The following activities were included:  -    therapeutic activities to improve functional performance for 0  minutes, including:        gait training to improve functional mobility and safety for 0  minutes, including:            Patient Education and Home Exercises     Home Exercises Provided and Patient Education Provided     Education provided:   - progressed HEP  and discuss importance in home, discuss developing exercise program after recovery    Written Home Exercises Provided: yes. Exercises were reviewed and Cortez was able to demonstrate them prior to the end of the session.  Cortez demonstrated good  understanding of the education provided. See EMR under Patient Instructions for exercises provided during therapy sessions    ASSESSMENT     Demonstrating improved gait mechanics with use of axillary crutches. Also discussed which crutch to use when she goes down to 1.     Cortez Is progressing well towards her goals.   Pt prognosis is Excellent.     Pt will continue to benefit from skilled outpatient physical therapy to address the deficits listed in the problem list box on initial evaluation, provide pt/family education and to maximize pt's level of independence in the home and community environment.     Pt's spiritual, cultural and educational needs considered and pt agreeable to plan of care and goals.     Anticipated barriers to physical therapy: none    Short Term Goals:  1 weeks  1. Pt will demonstrate increased knee flexion AROM to 90 degrees.  2. Pt will demonstrate increased knee extension AROM to -5 degrees.  3. Pt will ambulate with axillary crutches >1000' with improved technique and decrease pressure in axilla area.   4. Pt will demonstrate increased ham strength to 4/5.     Long Term Goals: 8 weeks  Pt will demonstrate R knee ROM 0-120 deg to improve ability to get in/out of car.   2.   Pt will ambulate without a device on level/unlevel surfaces > 1000' without increase in pain and demonstrate normalized gait pattern.   3.   Pt will demonstrate improved  R knee strength as evident by being able to ascend/descend stairs with reciprocal gait pattern.   4.  Pt will be have minimal complaints of increased pain with prolonged standing activities, such as cooking.   5. Pt will be independent with HEP and self management of symptoms.        PLAN     Pt will be treated  by physical therapy 2 times a week for 8 weeks for theraputetic exercises,progressive stretching, weight bearing, and strengthening, and neuromuscular eduction to achieve established goals. Cortez may at times be seen by a PTA as part of the Rehab Team.         I certify the need for these services furnished under this plan of treatment and while under my care.        Pricilla Spencer, PT

## 2022-11-22 ENCOUNTER — CLINICAL SUPPORT (OUTPATIENT)
Dept: REHABILITATION | Facility: HOSPITAL | Age: 32
End: 2022-11-22
Payer: MEDICAID

## 2022-11-22 DIAGNOSIS — S83.203D COMPLEX TEAR OF MENISCUS OF RIGHT KNEE AS CURRENT INJURY, UNSPECIFIED MENISCUS, SUBSEQUENT ENCOUNTER: Primary | ICD-10-CM

## 2022-11-22 PROCEDURE — 97110 THERAPEUTIC EXERCISES: CPT | Mod: PN

## 2022-11-22 NOTE — PROGRESS NOTES
"[facility]   Physical Therapy Treatment Note     Name: Cortez Radford  Clinic Number: [id number]    Therapy Diagnosis:   Encounter Diagnosis   Name Primary?    Complex tear of meniscus of right knee as current injury, unspecified meniscus, subsequent encounter Yes       Physician: Leila Mccullough, *    Visit Date: 11/22/2022      Visit #: 4/16     Time In: 0801  Time Out: 0845  Total Billable Time: 44 minutes    SUBJECTIVE     Pt reports: states she tripped walking outside 2 days ago and twisted knee. She has been able to continue exercising but is significantly more sore.    She was compliant with home exercise program.  Response to previous treatment: good,   Functional change: walking with crutches and tolerating Wbing   Pain: 5/10  Location: behind right knee      OBJECTIVE     Objective Measures updated at progress report unless specified.     Treatment     Cortez received the treatments listed below:      therapeutic exercises to develop strength, endurance, ROM, and flexibility for 40 minutes including  Straight leg raise 3 x 10  Straight leg raise with ER 3 x 10  Short arc quad with ankle pumps 3x10  Heel slides on Right Lower Extremity on physioball 3 x 10  Bridges on physioball 3 x 10   Standing heel raises on airex foam 3x10   Step ups on 6" step 3 x 10 on L LE   Sit to stands 3 x 10 from chair height on airex foam  Weight-shifting to R 3 x 10 with L Lower Extremity on step with 3 sec hold.  Weight- shifting forward/back 3 x 10 with 3 sec hold   LE bike at level 3 in the 6 position x 8 min  Not performed today:   Quad sets 3 x 10  Squats 3x10 with cuing for sequencing on airex foam  Sit to stand with R LE back for increase workload x 10  manual therapy techniques: Soft tissue Mobilization were applied to the: R knee for 0 minutes, including:      neuromuscular re-education activities to improve: Desensitization for 5 minutes. The following activities were " included:  -    therapeutic activities to improve functional performance for 0  minutes, including:        gait training to improve functional mobility and safety for 0  minutes, including:            Patient Education and Home Exercises     Home Exercises Provided and Patient Education Provided     Education provided:   - progressed HEP and discuss importance in home, discuss developing exercise program after recovery. Using desensitization techniques for knee hypersensitivity along the medial side of knee.     Written Home Exercises Provided: yes. Exercises were reviewed and Cortez was able to demonstrate them prior to the end of the session.  Cortez demonstrated good  understanding of the education provided. See EMR under Patient Instructions for exercises provided during therapy sessions    ASSESSMENT     She did have increase tenderness with loading activities on this date, but no redness increase swelling in knee joint noted. Advised her not to get rid crutch until pain has subsided.     Cortez Is progressing well towards her goals.   Pt prognosis is Excellent.     Pt will continue to benefit from skilled outpatient physical therapy to address the deficits listed in the problem list box on initial evaluation, provide pt/family education and to maximize pt's level of independence in the home and community environment.     Pt's spiritual, cultural and educational needs considered and pt agreeable to plan of care and goals.     Anticipated barriers to physical therapy: none    Short Term Goals:  1 weeks  1. Pt will demonstrate increased knee flexion AROM to 90 degrees.  2. Pt will demonstrate increased knee extension AROM to -5 degrees.  3. Pt will ambulate with axillary crutches >1000' with improved technique and decrease pressure in axilla area.   4. Pt will demonstrate increased ham strength to 4/5.     Long Term Goals: 8 weeks  Pt will demonstrate R knee ROM 0-120 deg to improve ability to get in/out of car.    2.   Pt will ambulate without a device on level/unlevel surfaces > 1000' without increase in pain and demonstrate normalized gait pattern.   3.   Pt will demonstrate improved  R knee strength as evident by being able to ascend/descend stairs with reciprocal gait pattern.   4.  Pt will be have minimal complaints of increased pain with prolonged standing activities, such as cooking.   5. Pt will be independent with HEP and self management of symptoms.        PLAN     Pt will be treated by physical therapy 2 times a week for 8 weeks for theraputetic exercises,progressive stretching, weight bearing, and strengthening, and neuromuscular eduction to achieve established goals. Cortez may at times be seen by a PTA as part of the Rehab Team.         I certify the need for these services furnished under this plan of treatment and while under my care.        Pricilla Spencer, PT

## 2022-11-29 ENCOUNTER — CLINICAL SUPPORT (OUTPATIENT)
Dept: REHABILITATION | Facility: HOSPITAL | Age: 32
End: 2022-11-29
Payer: MEDICAID

## 2022-11-29 DIAGNOSIS — S83.203D COMPLEX TEAR OF MENISCUS OF RIGHT KNEE AS CURRENT INJURY, UNSPECIFIED MENISCUS, SUBSEQUENT ENCOUNTER: Primary | ICD-10-CM

## 2022-11-29 PROCEDURE — 97110 THERAPEUTIC EXERCISES: CPT | Mod: PN

## 2022-11-29 NOTE — PROGRESS NOTES
[facility]   Physical Therapy Progress Note     Name: Cortez Radford  Clinic Number: [id number]    Therapy Diagnosis:   No diagnosis found.      Physician: Leila Mccullough, *    Visit Date: 11/29/2022      Visit #: 4/16     Time In: 0804  Time Out: 0845  Total Billable Time: 44 minutes    SUBJECTIVE     Pt reports: stopped using her crutches yesterday. States that pain is not bad, but she continues to walk with a limp. Also reports less hypersensitivity along medial border. Follow up with ortho - 12/5. Continues to perform her HEP  She was compliant with home exercise program.  Response to previous treatment: good,   Functional change: walking with crutches and tolerating Wbing   Pain: 3/10  Location: behind right knee      OBJECTIVE     Observation: ambulating antalgic gait, no device     Right Knee: incisions present and covered, no redness noted     Range of Motion: Knee    Left Right   Flexion:    Extension WNL  0       Strength: Knee    Left Right   Quadriceps 5/5 3/5, no resistancegiven at this time, but able to maintain SAQ for several minutes   Hamstrings 5/5 3+/5            Joint Mobility: hypomobile  Palpation: tendernes noted in medial aspect and hamstring insertion  Sensation: intact to light touch     Edema:     Left: absent  Right: minimal     Girth    Left Right   10 cm above joint line 41 cm 42 cm   Joint Line 40 cm 40 cm   10 cm below joint line 47 cm 46 cm      Level of Assistance: independent  Balance: good balance strategies.    Treatment     Cortez received the treatments listed below:      therapeutic exercises to develop strength, endurance, ROM, and flexibility for 40 minutes including  Straight leg raise with 2# 3 x 10  Straight leg raise with ER with 2# 3 x 10  Short arc quad with ankle pumps 3x10  90/90 hamstring 2 x 10 on Right Lower Extremity   Bridges on physioball 3 x 10   Standing heel raises on airex foam 3x10   Squats 3x10 with cuing for  "sequencing on airex foam  Side stepping with red theraband 2 x 50' in ea direction  Back lunge with red theraband 2 x 10 on ea LE.   Step ups on 6" step 3 x 10 on R LE   LE bike at level 3 in the 6 position x 5 min  Not performed today:   Heel slides on Right Lower Extremity on physioball 3 x 10  Sit to stands 3 x 10 from chair height on airex foam  Weight-shifting to R 3 x 10 with L Lower Extremity on step with 3 sec hold.  Weight- shifting forward/back 3 x 10 with 3 sec hold Quad sets 3 x 10  Sit to stand with R LE back for increase workload x 10    manual therapy techniques: Soft tissue Mobilization were applied to the: R knee for 0 minutes, including:      neuromuscular re-education activities to improve: Desensitization for 0 minutes. The following activities were included:  -    therapeutic activities to improve functional performance for 0  minutes, including:        gait training to improve functional mobility and safety for 0  minutes, including:            Patient Education and Home Exercises     Home Exercises Provided and Patient Education Provided     Education provided:   - added hamstring stretch and quad strengthening exercise, discussed importance of pacing her time on her legs while unsupported.     Written Home Exercises Provided: yes. Exercises were reviewed and Cortez was able to demonstrate them prior to the end of the session.  Cortez demonstrated good  understanding of the education provided. See EMR under Patient Instructions for exercises provided during therapy sessions    ASSESSMENT     She continues to tolerate her treatment well. Noted some hamstring flexibility issues but she is otherwise tolerating all loading activities on her Right Lower Extremity well. She has met all short term goals and is demonstrating ROM 0 - 115 degrees.     Cortez Is progressing well towards her goals.   Pt prognosis is Excellent.     Pt will continue to benefit from skilled outpatient physical therapy to " address the deficits listed in the problem list box on initial evaluation, provide pt/family education and to maximize pt's level of independence in the home and community environment.     Pt's spiritual, cultural and educational needs considered and pt agreeable to plan of care and goals.     Anticipated barriers to physical therapy: none    Short Term Goals:  1 weeks  1. Pt will demonstrate increased knee flexion AROM to 90 degrees. GOAL MET  2. Pt will demonstrate increased knee extension AROM to -5 degrees.GOAL MET  3. Pt will ambulate with axillary crutches >1000' with improved technique and decrease pressure in axilla area. GOAL MET  4. Pt will demonstrate increased ham strength to 4/5. GOAL MET      Long Term Goals: 8 weeks  Pt will demonstrate R knee ROM 0-120 deg to improve ability to get in/out of car.   2.   Pt will ambulate without a device on level/unlevel surfaces > 1000' without increase in pain and demonstrate normalized gait pattern.   3.   Pt will demonstrate improved  R knee strength as evident by being able to ascend/descend stairs with reciprocal gait pattern.   4.  Pt will be have minimal complaints of increased pain with prolonged standing activities, such as cooking.   5. Pt will be independent with HEP and self management of symptoms. GOAL MET       PLAN     Pt will be treated by physical therapy 2 times a week for 8 weeks for theraputetic exercises,progressive stretching, weight bearing, and strengthening, and neuromuscular eduction to achieve established goals. Cortez may at times be seen by a PTA as part of the Rehab Team.         I certify the need for these services furnished under this plan of treatment and while under my care.        Pricilla Spencer, PT , NCS

## 2022-12-01 ENCOUNTER — CLINICAL SUPPORT (OUTPATIENT)
Dept: REHABILITATION | Facility: HOSPITAL | Age: 32
End: 2022-12-01
Payer: MEDICAID

## 2022-12-01 DIAGNOSIS — S83.203D COMPLEX TEAR OF MENISCUS OF RIGHT KNEE AS CURRENT INJURY, UNSPECIFIED MENISCUS, SUBSEQUENT ENCOUNTER: Primary | ICD-10-CM

## 2022-12-01 PROCEDURE — 97110 THERAPEUTIC EXERCISES: CPT | Mod: PN

## 2022-12-01 NOTE — PROGRESS NOTES
[facility]   Physical Therapy Progress Note     Name: Cortez Radford  Clinic Number: [id number]    Therapy Diagnosis:   No diagnosis found.      Physician: Leila Mccullough, *    Visit Date: 12/1/2022      Visit #: 5/16     Time In: 0800  Time Out: 0915  Total Billable Time: 55 minutes, 10 min for ice    SUBJECTIVE     Pt reports: being very sore on Wednesday after therapy, soreness has pretty much resolved. States she did not use ice. Pain is still doing well. Desentization is going well.   Response to previous treatment: good,   Functional change: no longer using device, walking with a limp.   Pain: 2/10  Location: behind right knee      OBJECTIVE     Observation: ambulating antalgic gait, no device     Right Knee: incisions present and covered, no redness noted     Range of Motion: Knee    Left Right   Flexion:    Extension WNL  0       Strength: Knee    Left Right   Quadriceps 5/5 3/5, no resistancegiven at this time, but able to maintain SAQ for several minutes   Hamstrings 5/5 3+/5            Joint Mobility: hypomobile  Palpation: tendernes noted in medial aspect and hamstring insertion  Sensation: intact to light touch     Edema:     Left: absent  Right: minimal     Girth    Left Right   10 cm above joint line 41 cm 42 cm   Joint Line 40 cm 40 cm   10 cm below joint line 47 cm 46 cm      Level of Assistance: independent  Balance: good balance strategies.    Treatment     Cortez received the treatments listed below:      therapeutic exercises to develop strength, endurance, ROM, and flexibility for 44 minutes including  Straight leg raise with 2# 3 x 10 on B LE   Straight leg raise with ER with 2# 3 x 10  Short arc quad with ankle pumps 3x10 with 2#  90/90 hamstring 2 x 10 on Right Lower Extremity   Bridges on physioball 3 x 10 with ball further out for increase hamstring strength  Monster walks forward/backwards 2 x 20' in ea direction  Heel slides on Right Lower Extremity  "on physioball 3 x 10  Squats with heel raise 3x10 with cuing for sequencing on airex foam  Side stepping with red theraband 2 x 50' in ea direction  Back lunge with red theraband 2 x 10 on ea LE.   Step ups on 6" step 3 x 10 on R LE with red theraband   LE bike at level 3 in the 6 position x 5 min    Not performed today:   Standing heel raises on airex foam 3x10   Sit to stands 3 x 10 from chair height on airex foam  Weight-shifting to R 3 x 10 with L Lower Extremity on step with 3 sec hold.  Weight- shifting forward/back 3 x 10 with 3 sec hold Quad sets 3 x 10  Sit to stand with R LE back for increase workload x 10    manual therapy techniques: Soft tissue Mobilization were applied to the: R knee for 0 minutes, including:      neuromuscular re-education activities to improve kinesthetic/joint awareness: for 10 minutes. The following activities were included:  Standing terminal knee extension against red theraband 2 x 10     therapeutic activities to improve functional performance for 0  minutes, including:        gait training to improve functional mobility and safety for 0  minutes, including:      Modalities for pain control and edema control:  Ice to R knee for 10 min         Patient Education and Home Exercises     Home Exercises Provided and Patient Education Provided     Education provided:   - pt having pain at terminal knee extension. Working on control at end range into hyperextension    Written Home Exercises Provided: yes. Exercises were reviewed and Cortez was able to demonstrate them prior to the end of the session.  Cortez demonstrated good  understanding of the education provided. See EMR under Patient Instructions for exercises provided during therapy sessions    ASSESSMENT     She continues to tolerate her treatment well. Hamstrings appear more flexible on this date. Noted pain at terminal knee extension. Working on hamstring control at end range.     Cortez Is progressing well towards her goals.   Pt " prognosis is Excellent.     Pt will continue to benefit from skilled outpatient physical therapy to address the deficits listed in the problem list box on initial evaluation, provide pt/family education and to maximize pt's level of independence in the home and community environment.     Pt's spiritual, cultural and educational needs considered and pt agreeable to plan of care and goals.     Anticipated barriers to physical therapy: none    Short Term Goals:  1 weeks  1. Pt will demonstrate increased knee flexion AROM to 90 degrees. GOAL MET  2. Pt will demonstrate increased knee extension AROM to -5 degrees.GOAL MET  3. Pt will ambulate with axillary crutches >1000' with improved technique and decrease pressure in axilla area. GOAL MET  4. Pt will demonstrate increased ham strength to 4/5. GOAL MET      Long Term Goals: 8 weeks  Pt will demonstrate R knee ROM 0-120 deg to improve ability to get in/out of car.   2.   Pt will ambulate without a device on level/unlevel surfaces > 1000' without increase in pain and demonstrate normalized gait pattern.   3.   Pt will demonstrate improved  R knee strength as evident by being able to ascend/descend stairs with reciprocal gait pattern.   4.  Pt will be have minimal complaints of increased pain with prolonged standing activities, such as cooking.   5. Pt will be independent with HEP and self management of symptoms. GOAL MET       PLAN     Pt will be treated by physical therapy 2 times a week for 8 weeks for theraputetic exercises,progressive stretching, weight bearing, and strengthening, and neuromuscular eduction to achieve established goals. Cortez may at times be seen by a PTA as part of the Rehab Team.         I certify the need for these services furnished under this plan of treatment and while under my care.        Pricilla Spencer, PT , NCS

## 2022-12-02 ENCOUNTER — TELEPHONE (OUTPATIENT)
Dept: ORTHOPEDICS | Facility: CLINIC | Age: 32
End: 2022-12-02
Payer: MEDICAID

## 2022-12-02 NOTE — TELEPHONE ENCOUNTER
----- Message from Nikki Jacobo LPN sent at 12/2/2022  8:33 AM CST -----  Contact: [phone]  [facility] pt.   ----- Message -----  From: Christie Florentino LPN  Sent: 12/1/2022  11:09 AM CST  To: MILLI Leija I tried to handle this one but it won't let me reschedule.  ----- Message -----  From: Yessy Acevedo  Sent: 12/1/2022   9:30 AM CST  To: Dangelo Mccullough Staff    the patient is calling to get rescheduled for there appt. For post op   the patient would like a call back at your earliest convenience.  the patient can be reached at. [phone]

## 2022-12-07 ENCOUNTER — CLINICAL SUPPORT (OUTPATIENT)
Dept: REHABILITATION | Facility: HOSPITAL | Age: 32
End: 2022-12-07
Payer: MEDICAID

## 2022-12-07 DIAGNOSIS — S83.203D COMPLEX TEAR OF MENISCUS OF RIGHT KNEE AS CURRENT INJURY, UNSPECIFIED MENISCUS, SUBSEQUENT ENCOUNTER: Primary | ICD-10-CM

## 2022-12-07 PROCEDURE — 97112 NEUROMUSCULAR REEDUCATION: CPT | Mod: PN

## 2022-12-07 PROCEDURE — 97110 THERAPEUTIC EXERCISES: CPT | Mod: PN

## 2022-12-07 NOTE — PROGRESS NOTES
[facility]   Physical Therapy Progress Note     Name: Cortez Radford  Clinic Number: [id number]    Therapy Diagnosis:   No diagnosis found.      Physician: Leila Mccullough, *    Visit Date: 12/7/2022      Visit #: 6/16     Time In: 1350  Time Out: 1435  Total Billable Time: 45 minutes    SUBJECTIVE     Pt reports: doing well, no new complaints   Response to previous treatment: good,   Functional change: no longer using device, walking with a limp.   Pain: 0/10  Location: behind right knee      OBJECTIVE     Observation: ambulating antalgic gait, no device     Right Knee: incisions present and covered, no redness noted     Range of Motion: Knee    Left Right   Flexion:    Extension WNL  0       Strength: Knee    Left Right   Quadriceps 5/5 3/5, no resistancegiven at this time, but able to maintain SAQ for several minutes   Hamstrings 5/5 3+/5            Joint Mobility: hypomobile  Palpation: tendernes noted in medial aspect and hamstring insertion  Sensation: intact to light touch     Edema:     Left: absent  Right: minimal     Girth    Left Right   10 cm above joint line 41 cm 42 cm   Joint Line 40 cm 40 cm   10 cm below joint line 47 cm 46 cm      Level of Assistance: independent  Balance: good balance strategies.    Treatment     Cortez received the treatments listed below:      therapeutic exercises to develop strength, endurance, ROM, and flexibility for 30 minutes including  Straight leg raise with 2# 3 x 10 on B LE   Straight leg raise with ER with 2# 3 x 10  Bridges on physioball 3 x 10 with ball further out for increase hamstring strength  Heel slides on Right Lower Extremity on physioball 3 x 10  Bosu ball single leg squat 3 x 10 on ea LE  Towel slides lateral 3 x 10 on ea LE  Towel slides backwards 3 x 10 on ea LE  LE bike at level 3 in the 5 position x 5 min    Not performed today:   Side stepping with red theraband 2 x 50' in ea direction  Back lunge with red  "theraband 2 x 10 on ea LE.   Squats with heel raise 3x10 with cuing for sequencing on airex foam  Step ups on 6" step 3 x 10 on R LE with red theraband   Short arc quad with ankle pumps 3x10 with 2#  90/90 hamstring 2 x 10 on Right Lower Extremity   Monster walks forward/backwards 2 x 20' in ea direction  Standing heel raises on airex foam 3x10   Sit to stands 3 x 10 from chair height on airex foam  Weight-shifting to R 3 x 10 with L Lower Extremity on step with 3 sec hold.  Weight- shifting forward/back 3 x 10 with 3 sec hold Quad sets 3 x 10  Sit to stand with R LE back for increase workload x 10    manual therapy techniques: Soft tissue Mobilization were applied to the: R knee for 0 minutes, including:      neuromuscular re-education activities to improve kinesthetic/joint awareness: for 15 minutes. The following activities were included:  Towel slides lateral 3 x 10 on ea LE for single limb support   Towel slides backwards 3 x 10 on ea LE for single limb support  Not performed on this date:   Standing terminal knee extension against red theraband 2 x 10     therapeutic activities to improve functional performance for 0  minutes, including:        gait training to improve functional mobility and safety for 0  minutes, including:      Modalities for pain control and edema control:  Ice to R knee for 10 min         Patient Education and Home Exercises     Home Exercises Provided and Patient Education Provided     Education provided:   - pt having pain at terminal knee extension. Working on control at end range into hyperextension    Written Home Exercises Provided: yes. Exercises were reviewed and Cortez was able to demonstrate them prior to the end of the session.  Cortez demonstrated good  understanding of the education provided. See EMR under Patient Instructions for exercises provided during therapy sessions    ASSESSMENT     She continues to tolerate her treatment well. Hamstrings appear more flexible on this " date. Noted pain at terminal knee extension. Working on hamstring control at end range.     Cortez Is progressing well towards her goals.   Pt prognosis is Excellent.     Pt will continue to benefit from skilled outpatient physical therapy to address the deficits listed in the problem list box on initial evaluation, provide pt/family education and to maximize pt's level of independence in the home and community environment.     Pt's spiritual, cultural and educational needs considered and pt agreeable to plan of care and goals.     Anticipated barriers to physical therapy: none    Short Term Goals:  1 weeks  1. Pt will demonstrate increased knee flexion AROM to 90 degrees. GOAL MET  2. Pt will demonstrate increased knee extension AROM to -5 degrees.GOAL MET  3. Pt will ambulate with axillary crutches >1000' with improved technique and decrease pressure in axilla area. GOAL MET  4. Pt will demonstrate increased ham strength to 4/5. GOAL MET      Long Term Goals: 8 weeks  Pt will demonstrate R knee ROM 0-120 deg to improve ability to get in/out of car.   2.   Pt will ambulate without a device on level/unlevel surfaces > 1000' without increase in pain and demonstrate normalized gait pattern.   3.   Pt will demonstrate improved  R knee strength as evident by being able to ascend/descend stairs with reciprocal gait pattern.   4.  Pt will be have minimal complaints of increased pain with prolonged standing activities, such as cooking.   5. Pt will be independent with HEP and self management of symptoms. GOAL MET       PLAN     Pt will be treated by physical therapy 2 times a week for 8 weeks for theraputetic exercises,progressive stretching, weight bearing, and strengthening, and neuromuscular eduction to achieve established goals. Cortez may at times be seen by a PTA as part of the Rehab Team.         I certify the need for these services furnished under this plan of treatment and while under my care.        Pricilla Spencer,  PT , NCS

## 2022-12-08 ENCOUNTER — CLINICAL SUPPORT (OUTPATIENT)
Dept: REHABILITATION | Facility: HOSPITAL | Age: 32
End: 2022-12-08
Payer: MEDICAID

## 2022-12-08 DIAGNOSIS — S83.203D COMPLEX TEAR OF MENISCUS OF RIGHT KNEE AS CURRENT INJURY, UNSPECIFIED MENISCUS, SUBSEQUENT ENCOUNTER: Primary | ICD-10-CM

## 2022-12-08 PROCEDURE — 97110 THERAPEUTIC EXERCISES: CPT | Mod: PN

## 2022-12-08 NOTE — PROGRESS NOTES
[facility]   Physical Therapy Progress Note     Name: Cortez Radford  Clinic Number: [id number]    Therapy Diagnosis:   Encounter Diagnosis   Name Primary?    Complex tear of meniscus of right knee as current injury, unspecified meniscus, subsequent encounter Yes         Physician: Leila Mccullough, *    Visit Date: 12/8/2022      Visit #: 7/16     Time In: 0805  Time Out: 0900  Total Billable Time: 55 minutes    SUBJECTIVE     Pt reports: doing well, no new complaints. No soreness from yesterday.   Response to previous treatment: good,   Functional change: no longer using device, walking with a limp.   Pain: 0/10  Location: behind right knee      OBJECTIVE     Observation: ambulating antalgic gait, no device     Right Knee: incisions present and covered, no redness noted     Range of Motion: Knee    Left Right   Flexion:    Extension WNL  0       Strength: Knee    Left Right   Quadriceps 5/5 3/5, no resistancegiven at this time, but able to maintain SAQ for several minutes   Hamstrings 5/5 3+/5            Joint Mobility: hypomobile  Palpation: tendernes noted in medial aspect and hamstring insertion  Sensation: intact to light touch     Edema:     Left: absent  Right: minimal     Girth    Left Right   10 cm above joint line 41 cm 42 cm   Joint Line 40 cm 40 cm   10 cm below joint line 47 cm 46 cm      Level of Assistance: independent  Balance: good balance strategies.    Treatment     Cortez received the treatments listed below:      therapeutic exercises to develop strength, endurance, ROM, and flexibility for 30 minutes including  Straight leg raise with 2.5# 3 x 10 on B LE   Straight leg raise with ER with 2.5# 3 x 10  Bridges on physioball 3 x 10 with ball further out for increase hamstring strength  Heel slides on Right Lower Extremity on physioball 3 x 10  Shuttle leg press with 2 bands x 10, with 3 bands 2 x 10   Bosu ball squat 2 x 10   Towel slides lateral 3 x 10 on ea  "LE  Towel slides backwards 3 x 10 on ea LE  Side stepping into squat with blue theraband 2 x 50' in ea direction  Monster walks forward/backwards 2 x 20' in ea direction  Single leg hip extension 3 x 10   LE bike at level 4 in the 5 position x 5 min    Not performed today:   Back lunge with red theraband 2 x 10 on ea LE.   Squats with heel raise 3x10 with cuing for sequencing on airex foam  Step ups on 6" step 3 x 10 on R LE with red theraband   Short arc quad with ankle pumps 3x10 with 2#  90/90 hamstring 2 x 10 on Right Lower Extremity   Standing heel raises on airex foam 3x10   Sit to stands 3 x 10 from chair height on airex foam  Weight-shifting to R 3 x 10 with L Lower Extremity on step with 3 sec hold.  Weight- shifting forward/back 3 x 10 with 3 sec hold Quad sets 3 x 10  Sit to stand with R LE back for increase workload x 10    manual therapy techniques: Soft tissue Mobilization were applied to the: R knee for 0 minutes, including:      neuromuscular re-education activities to improve kinesthetic/joint awareness: for 15 minutes. The following activities were included:  Towel slides with circles 2 x 15 on ea LE for single limb support   Towel slides backwards 3 x 10 on ea LE for single limb support  Not performed on this date:   Towel slides lateral 3 x 10 on ea LE for single limb support   Standing terminal knee extension against red theraband 2 x 10     therapeutic activities to improve functional performance for 0  minutes, including:        gait training to improve functional mobility and safety for 0  minutes, including:      Modalities for pain control and edema control:  Ice to R knee for 10 min         Patient Education and Home Exercises     Home Exercises Provided and Patient Education Provided     Education provided:   - pt having pain at terminal knee extension. Working on control at end range into hyperextension    Written Home Exercises Provided: yes. Exercises were reviewed and Cortez was able " to demonstrate them prior to the end of the session.  Cortez demonstrated good  understanding of the education provided. See EMR under Patient Instructions for exercises provided during therapy sessions    ASSESSMENT     She continues to progress and has improved with single limb standing. She also is demonstrating improved control of genu recurvatum. Encouraged pt to begin walking program.     Cortez Is progressing well towards her goals.   Pt prognosis is Excellent.     Pt will continue to benefit from skilled outpatient physical therapy to address the deficits listed in the problem list box on initial evaluation, provide pt/family education and to maximize pt's level of independence in the home and community environment.     Pt's spiritual, cultural and educational needs considered and pt agreeable to plan of care and goals.     Anticipated barriers to physical therapy: none    Short Term Goals:  1 weeks  1. Pt will demonstrate increased knee flexion AROM to 90 degrees. GOAL MET  2. Pt will demonstrate increased knee extension AROM to -5 degrees.GOAL MET  3. Pt will ambulate with axillary crutches >1000' with improved technique and decrease pressure in axilla area. GOAL MET  4. Pt will demonstrate increased ham strength to 4/5. GOAL MET      Long Term Goals: 8 weeks  Pt will demonstrate R knee ROM 0-120 deg to improve ability to get in/out of car.   2.   Pt will ambulate without a device on level/unlevel surfaces > 1000' without increase in pain and demonstrate normalized gait pattern.   3.   Pt will demonstrate improved  R knee strength as evident by being able to ascend/descend stairs with reciprocal gait pattern.   4.  Pt will be have minimal complaints of increased pain with prolonged standing activities, such as cooking.   5. Pt will be independent with HEP and self management of symptoms. GOAL MET       PLAN     Pt will be treated by physical therapy 2 times a week for 8 weeks for theraputetic  exercises,progressive stretching, weight bearing, and strengthening, and neuromuscular eduction to achieve established goals. Cortez may at times be seen by a PTA as part of the Rehab Team.         I certify the need for these services furnished under this plan of treatment and while under my care.        Pricilla Spencer, PT , NCS

## 2022-12-12 ENCOUNTER — CLINICAL SUPPORT (OUTPATIENT)
Dept: REHABILITATION | Facility: HOSPITAL | Age: 32
End: 2022-12-12
Payer: MEDICAID

## 2022-12-12 DIAGNOSIS — S83.203D COMPLEX TEAR OF MENISCUS OF RIGHT KNEE AS CURRENT INJURY, UNSPECIFIED MENISCUS, SUBSEQUENT ENCOUNTER: Primary | ICD-10-CM

## 2022-12-12 PROCEDURE — 97110 THERAPEUTIC EXERCISES: CPT | Mod: PN

## 2022-12-12 NOTE — PROGRESS NOTES
[facility]   Physical Therapy Progress Note     Name: Cortez Radofrd  Clinic Number: [id number]    Therapy Diagnosis:   Encounter Diagnosis   Name Primary?    Complex tear of meniscus of right knee as current injury, unspecified meniscus, subsequent encounter Yes           Physician: Leila Mccullough, *    Visit Date: 12/12/2022      Visit #: 10/16     Time In: 0845  Time Out: 0930  Total Billable Time: 45 minutes    SUBJECTIVE     Pt reports: doing well, no new complaints. No soreness from yesterday.   Response to previous treatment: good,   Functional change: no longer using device, walking with a limp.   Pain: 0/10  Location: behind right knee      OBJECTIVE     Observation: ambulating antalgic gait, no device     Right Knee: incisions present and covered, no redness noted     Range of Motion: Knee    Left Right   Flexion:    Extension WNL  0       Strength: Knee    Left Right   Quadriceps 5/5 3/5, no resistancegiven at this time, but able to maintain SAQ for several minutes   Hamstrings 5/5 3+/5            Joint Mobility: hypomobile  Palpation: tendernes noted in medial aspect and hamstring insertion  Sensation: intact to light touch     Edema:     Left: absent  Right: minimal     Girth    Left Right   10 cm above joint line 41 cm 42 cm   Joint Line 40 cm 40 cm   10 cm below joint line 47 cm 46 cm      Level of Assistance: independent  Balance: good balance strategies.    Treatment     Cortez received the treatments listed below:      therapeutic exercises to develop strength, endurance, ROM, and flexibility for 45 minutes including  Straight leg raise with 2.5# 3 x 10 on B LE   Straight leg raise with ER with 2.5# 3 x 10  Bridges on physioball 3 x 10 with leg lift to increase workload on Right Lower Extremity 2 x 10   Heel slides on Right Lower Extremity on physioball 2 x 10 with bridge  Shuttle leg press with 3 bands 2 x 10   Bosu ball squat 2 x 10   Bosu ball single LE  "squats on Right Lower Extremity 3 x 10   Side stepping into squat with blue theraband 2 x 50' in ea direction  Monster walks forward/backwards 2 x 50' in ea direction  Single leg hip extension 3 x 10       Not performed today:   LE bike at level 4 in the 5 position x 5 min  Back lunge with red theraband 2 x 10 on ea LE.   Towel slides lateral 3 x 10 on ea LE  Towel slides backwards 3 x 10 on ea LE  Squats with heel raise 3x10 with cuing for sequencing on airex foam  Step ups on 6" step 3 x 10 on R LE with red theraband   Short arc quad with ankle pumps 3x10 with 2#  90/90 hamstring 2 x 10 on Right Lower Extremity   Standing heel raises on airex foam 3x10   Sit to stands 3 x 10 from chair height on airex foam  Weight-shifting to R 3 x 10 with L Lower Extremity on step with 3 sec hold.  Weight- shifting forward/back 3 x 10 with 3 sec hold Quad sets 3 x 10  Sit to stand with R LE back for increase workload x 10    manual therapy techniques: Soft tissue Mobilization were applied to the: R knee for 0 minutes, including:      neuromuscular re-education activities to improve kinesthetic/joint awareness: for 0 minutes. The following activities were included:  Towel slides with circles 2 x 15 on ea LE for single limb support   Towel slides backwards 3 x 10 on ea LE for single limb support  Not performed on this date:   Towel slides lateral 3 x 10 on ea LE for single limb support   Standing terminal knee extension against red theraband 2 x 10     therapeutic activities to improve functional performance for 0  minutes, including:        gait training to improve functional mobility and safety for 0  minutes, including:      Modalities for pain control and edema control:  Ice to R knee for 10 min         Patient Education and Home Exercises     Home Exercises Provided and Patient Education Provided     Education provided:   - pt having pain at terminal knee extension. Working on control at end range into " hyperextension    Written Home Exercises Provided: yes. Exercises were reviewed and Cortez was able to demonstrate them prior to the end of the session.  Cortez demonstrated good  understanding of the education provided. See EMR under Patient Instructions for exercises provided during therapy sessions    ASSESSMENT     She continues to progress and has improved with single limb standing. She also is demonstrating improved control of genu recurvatum. Encouraged pt to begin walking program.     Cortez Is progressing well towards her goals.   Pt prognosis is Excellent.     Pt will continue to benefit from skilled outpatient physical therapy to address the deficits listed in the problem list box on initial evaluation, provide pt/family education and to maximize pt's level of independence in the home and community environment.     Pt's spiritual, cultural and educational needs considered and pt agreeable to plan of care and goals.     Anticipated barriers to physical therapy: none    Short Term Goals:  1 weeks  1. Pt will demonstrate increased knee flexion AROM to 90 degrees. GOAL MET  2. Pt will demonstrate increased knee extension AROM to -5 degrees.GOAL MET  3. Pt will ambulate with axillary crutches >1000' with improved technique and decrease pressure in axilla area. GOAL MET  4. Pt will demonstrate increased ham strength to 4/5. GOAL MET      Long Term Goals: 8 weeks  Pt will demonstrate R knee ROM 0-120 deg to improve ability to get in/out of car.   2.   Pt will ambulate without a device on level/unlevel surfaces > 1000' without increase in pain and demonstrate normalized gait pattern.   3.   Pt will demonstrate improved  R knee strength as evident by being able to ascend/descend stairs with reciprocal gait pattern.   4.  Pt will be have minimal complaints of increased pain with prolonged standing activities, such as cooking.   5. Pt will be independent with HEP and self management of symptoms. GOAL MET       PLAN      Pt will be treated by physical therapy 2 times a week for 8 weeks for theraputetic exercises,progressive stretching, weight bearing, and strengthening, and neuromuscular eduction to achieve established goals. Cortez may at times be seen by a PTA as part of the Rehab Team.         I certify the need for these services furnished under this plan of treatment and while under my care.        Pricilla Spencer, PT , NCS

## 2022-12-14 ENCOUNTER — OFFICE VISIT (OUTPATIENT)
Dept: ORTHOPEDICS | Facility: CLINIC | Age: 32
End: 2022-12-14
Payer: MEDICAID

## 2022-12-14 ENCOUNTER — TELEPHONE (OUTPATIENT)
Dept: ORTHOPEDICS | Facility: CLINIC | Age: 32
End: 2022-12-14
Payer: MEDICAID

## 2022-12-14 VITALS
WEIGHT: 238 LBS | SYSTOLIC BLOOD PRESSURE: 105 MMHG | BODY MASS INDEX: 43.79 KG/M2 | HEIGHT: 62 IN | HEART RATE: 64 BPM | RESPIRATION RATE: 18 BRPM | DIASTOLIC BLOOD PRESSURE: 70 MMHG

## 2022-12-14 DIAGNOSIS — S83.231A COMPLEX TEAR OF MEDIAL MENISCUS OF RIGHT KNEE AS CURRENT INJURY, INITIAL ENCOUNTER: Primary | ICD-10-CM

## 2022-12-14 PROCEDURE — 3044F HG A1C LEVEL LT 7.0%: CPT | Mod: CPTII,,,

## 2022-12-14 PROCEDURE — 99024 PR POST-OP FOLLOW-UP VISIT: ICD-10-PCS | Mod: ,,,

## 2022-12-14 PROCEDURE — 99024 POSTOP FOLLOW-UP VISIT: CPT | Mod: ,,,

## 2022-12-14 PROCEDURE — 3074F SYST BP LT 130 MM HG: CPT | Mod: CPTII,,,

## 2022-12-14 PROCEDURE — 3008F PR BODY MASS INDEX (BMI) DOCUMENTED: ICD-10-PCS | Mod: CPTII,,,

## 2022-12-14 PROCEDURE — 1159F MED LIST DOCD IN RCRD: CPT | Mod: CPTII,,,

## 2022-12-14 PROCEDURE — 3074F PR MOST RECENT SYSTOLIC BLOOD PRESSURE < 130 MM HG: ICD-10-PCS | Mod: CPTII,,,

## 2022-12-14 PROCEDURE — 99999 PR PBB SHADOW E&M-EST. PATIENT-LVL III: ICD-10-PCS | Mod: PBBFAC,,,

## 2022-12-14 PROCEDURE — 3044F PR MOST RECENT HEMOGLOBIN A1C LEVEL <7.0%: ICD-10-PCS | Mod: CPTII,,,

## 2022-12-14 PROCEDURE — 99999 PR PBB SHADOW E&M-EST. PATIENT-LVL III: CPT | Mod: PBBFAC,,,

## 2022-12-14 PROCEDURE — 99213 OFFICE O/P EST LOW 20 MIN: CPT | Mod: PBBFAC,PN

## 2022-12-14 PROCEDURE — 3078F DIAST BP <80 MM HG: CPT | Mod: CPTII,,,

## 2022-12-14 PROCEDURE — 3008F BODY MASS INDEX DOCD: CPT | Mod: CPTII,,,

## 2022-12-14 PROCEDURE — 1159F PR MEDICATION LIST DOCUMENTED IN MEDICAL RECORD: ICD-10-PCS | Mod: CPTII,,,

## 2022-12-14 PROCEDURE — 3078F PR MOST RECENT DIASTOLIC BLOOD PRESSURE < 80 MM HG: ICD-10-PCS | Mod: CPTII,,,

## 2022-12-14 RX ORDER — EZETIMIBE 10 MG/1
TABLET ORAL
COMMUNITY
Start: 2022-11-28 | End: 2023-09-15

## 2022-12-14 RX ORDER — CYCLOBENZAPRINE HCL 5 MG
TABLET ORAL
COMMUNITY
Start: 2022-12-05 | End: 2023-09-15

## 2022-12-14 RX ORDER — ATORVASTATIN CALCIUM 20 MG/1
TABLET, FILM COATED ORAL
COMMUNITY
Start: 2022-11-21 | End: 2023-09-15

## 2022-12-14 RX ORDER — VENLAFAXINE HYDROCHLORIDE 75 MG/1
75 CAPSULE, EXTENDED RELEASE ORAL
COMMUNITY
Start: 2022-10-31 | End: 2023-09-15

## 2022-12-14 RX ORDER — RIZATRIPTAN BENZOATE 10 MG/1
TABLET ORAL
COMMUNITY
Start: 2022-11-22 | End: 2023-09-15

## 2022-12-14 NOTE — TELEPHONE ENCOUNTER
----- Message from Jacqueline Bell sent at 12/14/2022 11:44 AM CST -----  Contact: tiffany @ [phone]  Cortez Radford calling regarding Appointment Access  (message) for #pt is on her way in heavy traffic about 8 min out

## 2022-12-14 NOTE — PROGRESS NOTES
Post-op Note    HPI    Cortez Radford is here 6 weeks s/p the following procedure:     10/24/2022  Right knee medial meniscus repair    Denies any pain. She is currently enrolled in Physical Therapy, reports she has 1 more week left.  States sometimes when she walks she feels her knee hyperextends.  Denies any chest pain or shortness of breathe. Denies any drainage from the incision. Denies any fevers, chills or paresthesias.  No other concerns at this time, overall doing well    DVT Prophylaxis:  completed      Physical Exam:     Patient is alert and oriented no acute distress.   Assistive Device:  none    Left knee: Portal site Incision(s) are well healed.  There is no evidence of dehiscence.  There is no induration erythema or signs of infection. Compartments are soft and compressible.  Warm well-perfused extremity.  Painless range of motion 0-130.  Negative Sukh.      Assessment    Cortez Radford is 6 weeks Post-op     Plan:    Overall doing well.  We discussed expectations of surgery and postoperative course.     Continued postoperative pain regimen -- Tylenol ibuprofen as needed  Continue physical therapy, encouraged home exercise program once PT is ended and encouraged quad strengthening to help with over extension.    Follow-up: 6 weeks   X-rays next visit:  None

## 2022-12-14 NOTE — TELEPHONE ENCOUNTER
Spoke with pt. Pt states she is about 8 minutes away from hospital. Will be here shortly. Advised pt we will see her when she arrives. All questions answered. Pt verbalized understanding.

## 2022-12-15 ENCOUNTER — CLINICAL SUPPORT (OUTPATIENT)
Dept: REHABILITATION | Facility: HOSPITAL | Age: 32
End: 2022-12-15
Payer: MEDICAID

## 2022-12-15 DIAGNOSIS — S83.203D COMPLEX TEAR OF MENISCUS OF RIGHT KNEE AS CURRENT INJURY, UNSPECIFIED MENISCUS, SUBSEQUENT ENCOUNTER: Primary | ICD-10-CM

## 2022-12-15 PROCEDURE — 97110 THERAPEUTIC EXERCISES: CPT | Mod: PN

## 2022-12-15 NOTE — PROGRESS NOTES
[facility]   Physical Therapy Progress Note     Name: Cortez Radford  Clinic Number: [id number]    Therapy Diagnosis:   Encounter Diagnosis   Name Primary?    Complex tear of meniscus of right knee as current injury, unspecified meniscus, subsequent encounter Yes           Physician: Leila Mccullough, *    Visit Date: 12/15/2022      Visit #: 11/16     Time In: 0800  Time Out: 0930  Total Billable Time: 45 minutes    SUBJECTIVE     Pt reports: continues to be doing well, states she started to begin her walking program, only making it half a block before returning.   Response to previous treatment: good,   Functional change: no longer using device, walking with a limp.   Pain: 0/10  Location: behind right knee      OBJECTIVE     Observation: ambulating antalgic gait, no device     Right Knee: incisions present and covered, no redness noted     Range of Motion: Knee    Left Right   Flexion:    Extension WNL  0       Strength: Knee    Left Right   Quadriceps 5/5 3/5, no resistancegiven at this time, but able to maintain SAQ for several minutes   Hamstrings 5/5 3+/5            Joint Mobility: hypomobile  Palpation: tendernes noted in medial aspect and hamstring insertion  Sensation: intact to light touch     Edema:     Left: absent  Right: minimal     Girth    Left Right   10 cm above joint line 41 cm 42 cm   Joint Line 40 cm 40 cm   10 cm below joint line 47 cm 46 cm      Level of Assistance: independent  Balance: good balance strategies.    Treatment     Cortez received the treatments listed below:      therapeutic exercises to develop strength, endurance, ROM, and flexibility for 45 minutes including    Bridges on physioball 3 x 10 with leg lift to increase workload on Right Lower Extremity 2 x 10   Heel slides on Right Lower Extremity on physioball 2 x 10 with bridge  Shuttle leg press Bilateral LE with 3 bands 2 x 10   Single leg shuttle press with 1 band 2 x 10   Bosu ball  "squat 3 x 10   Bosu ball side lunges 2 x 10   Bosu ball single LE squats on Right Lower Extremity 3 x 10   Side stepping into squat with blue theraband 2 x 50' in ea direction  Monster walks forward/backwards 2 x 50' in ea direction  LE bike at level 4 in the 5 position x 5 min = 1.22  Not performed today:   Straight leg raise with 2.5# 3 x 10 on B LE   Straight leg raise with ER with 2.5# 3 x 10  Single leg hip extension 3 x 10   Back lunge with red theraband 2 x 10 on ea LE.   Squats with heel raise 3x10 with cuing for sequencing on airex foam  Step ups on 6" step 3 x 10 on R LE with red theraband   Short arc quad with ankle pumps 3x10 with 2#  90/90 hamstring 2 x 10 on Right Lower Extremity   Standing heel raises on airex foam 3x10   Sit to stands 3 x 10 from chair height on airex foam  Weight-shifting to R 3 x 10 with L Lower Extremity on step with 3 sec hold.  Weight- shifting forward/back 3 x 10 with 3 sec hold Quad sets 3 x 10  Sit to stand with R LE back for increase workload x 10    manual therapy techniques: Soft tissue Mobilization were applied to the: R knee for 0 minutes, including:      neuromuscular re-education activities to improve kinesthetic/joint awareness: for 0 minutes. The following activities were included:  Towel slides clockwise  x 10 ea LE for single limb support   Towel slides counter clockwise x 10 on ea LE for single limb support  Not performed on this date:   Towel slides lateral 3 x 10 on ea LE for single limb support   Standing terminal knee extension against red theraband 2 x 10     therapeutic activities to improve functional performance for 0  minutes, including:        gait training to improve functional mobility and safety for 0  minutes, including:      Modalities for pain control and edema control:      Patient Education and Home Exercises     Home Exercises Provided and Patient Education Provided     Education provided:   - adding band for resistance to straight leg raises in " home exercise program     Written Home Exercises Provided: yes. Exercises were reviewed and Cortez was able to demonstrate them prior to the end of the session.  Cortez demonstrated good  understanding of the education provided. See EMR under Patient Instructions for exercises provided during therapy sessions    ASSESSMENT     Cortez is beginning to return to normal daily activities including walking program without an increase in pain, she does fatigue but is tolerating more single leg activities in therapy.     Cortez Is progressing well towards her goals.   Pt prognosis is Excellent.     Pt will continue to benefit from skilled outpatient physical therapy to address the deficits listed in the problem list box on initial evaluation, provide pt/family education and to maximize pt's level of independence in the home and community environment.     Pt's spiritual, cultural and educational needs considered and pt agreeable to plan of care and goals.     Anticipated barriers to physical therapy: none    Short Term Goals:  1 weeks  1. Pt will demonstrate increased knee flexion AROM to 90 degrees. GOAL MET  2. Pt will demonstrate increased knee extension AROM to -5 degrees.GOAL MET  3. Pt will ambulate with axillary crutches >1000' with improved technique and decrease pressure in axilla area. GOAL MET  4. Pt will demonstrate increased ham strength to 4/5. GOAL MET      Long Term Goals: 8 weeks  Pt will demonstrate R knee ROM 0-120 deg to improve ability to get in/out of car.   2.   Pt will ambulate without a device on level/unlevel surfaces > 1000' without increase in pain and demonstrate normalized gait pattern.   3.   Pt will demonstrate improved  R knee strength as evident by being able to ascend/descend stairs with reciprocal gait pattern.   4.  Pt will be have minimal complaints of increased pain with prolonged standing activities, such as cooking.   5. Pt will be independent with HEP and self management of symptoms.  GOAL MET       PLAN     Pt will be treated by physical therapy 2 times a week for 8 weeks for theraputetic exercises,progressive stretching, weight bearing, and strengthening, and neuromuscular eduction to achieve established goals. Cortez may at times be seen by a PTA as part of the Rehab Team.         I certify the need for these services furnished under this plan of treatment and while under my care.        Pricilla Spencer, PT , NCS

## 2022-12-20 ENCOUNTER — CLINICAL SUPPORT (OUTPATIENT)
Dept: REHABILITATION | Facility: HOSPITAL | Age: 32
End: 2022-12-20
Payer: MEDICAID

## 2022-12-20 DIAGNOSIS — S83.203D COMPLEX TEAR OF MENISCUS OF RIGHT KNEE AS CURRENT INJURY, UNSPECIFIED MENISCUS, SUBSEQUENT ENCOUNTER: Primary | ICD-10-CM

## 2022-12-20 PROCEDURE — 97110 THERAPEUTIC EXERCISES: CPT | Mod: PN

## 2022-12-20 NOTE — PROGRESS NOTES
"[facility]   Physical Therapy Progress Note     Name: Cortez Radford  Clinic Number: [id number]    Therapy Diagnosis:   Encounter Diagnosis   Name Primary?    Complex tear of meniscus of right knee as current injury, unspecified meniscus, subsequent encounter Yes           Physician: Leila Mccullough, *    Visit Date: 12/20/2022      Visit #: 12/16     Time In: 0800  Time Out: 0930  Total Billable Time: 45 minutes    SUBJECTIVE     Pt reports: participated in some difficulty remodeling without issue   Response to previous treatment: good,   Functional change: no longer using device, walking with a limp.   Pain: 0/10  Location: behind right knee      OBJECTIVE     To be re-assessed on discharge.     Treatment     Cortez received the treatments listed below:      therapeutic exercises to develop strength, endurance, ROM, and flexibility for 30 minutes including    Bridges on physioball 3 x 10 with leg lift to increase workload on Right Lower Extremity 2 x 10   Single leg bridge 2 x 10   Straight leg raise with 2.5# 3 x 10 on B LE   Straight leg raise with ER with 2.5# 3 x 10  Shuttle leg press Bilateral LE with 3 bands 2 x 10   Single leg shuttle press with 1 band 2 x 10   Bosu ball squat 3 x 10   Bosu weight shift lateral 2 x 10, forward/back 2 x 10   Bosu ball side lunges 2 x 10   Bosu ball single LE squats on Right Lower Extremity 3 x 10   LE bike at level 4 in the 5 position x 5 min = 1.26  Not performed today:   Single leg hip extension 3 x 10   Side stepping into squat with blue theraband 2 x 50' in ea direction  Monster walks forward/backwards 2 x 50' in ea direction  Back lunge with red theraband 2 x 10 on ea LE.   Squats with heel raise 3x10 with cuing for sequencing on airex foam  Step ups on 6" step 3 x 10 on R LE with red theraband   Short arc quad with ankle pumps 3x10 with 2#  90/90 hamstring 2 x 10 on Right Lower Extremity   Standing heel raises on airex foam 3x10   Sit to " stands 3 x 10 from chair height on airex foam  Weight-shifting to R 3 x 10 with L Lower Extremity on step with 3 sec hold.  Weight- shifting forward/back 3 x 10 with 3 sec hold Quad sets 3 x 10  Sit to stand with R LE back for increase workload x 10    manual therapy techniques: Soft tissue Mobilization were applied to the: R knee for 0 minutes, including:      neuromuscular re-education activities to improve kinesthetic/joint awareness: for 15 minutes. The following activities were included:  Towel slides clockwise  x 10 ea LE for single limb support   Towel slides counter clockwise x 10 on ea LE for single limb support  Not performed on this date:   Towel slides lateral 3 x 10 on ea LE for single limb support   Standing terminal knee extension against red theraband 2 x 10     therapeutic activities to improve functional performance for 0  minutes, including:        gait training to improve functional mobility and safety for 0  minutes, including:      Modalities for pain control and edema control:      Patient Education and Home Exercises     Home Exercises Provided and Patient Education Provided     Education provided:   - adding band for resistance to straight leg raises in home exercise program     Written Home Exercises Provided: yes. Exercises were reviewed and Cortez was able to demonstrate them prior to the end of the session.  Cortez demonstrated good  understanding of the education provided. See EMR under Patient Instructions for exercises provided during therapy sessions    ASSESSMENT     Cortez is beginning to return to normal daily activities including walking program without an increase in pain, she does fatigue but is tolerating more single leg activities in therapy.     Cortez Is progressing well towards her goals.   Pt prognosis is Excellent.     Pt will continue to benefit from skilled outpatient physical therapy to address the deficits listed in the problem list box on initial evaluation, provide  pt/family education and to maximize pt's level of independence in the home and community environment.     Pt's spiritual, cultural and educational needs considered and pt agreeable to plan of care and goals.     Anticipated barriers to physical therapy: none    Short Term Goals:  1 weeks  1. Pt will demonstrate increased knee flexion AROM to 90 degrees. GOAL MET  2. Pt will demonstrate increased knee extension AROM to -5 degrees.GOAL MET  3. Pt will ambulate with axillary crutches >1000' with improved technique and decrease pressure in axilla area. GOAL MET  4. Pt will demonstrate increased ham strength to 4/5. GOAL MET      Long Term Goals: 8 weeks  Pt will demonstrate R knee ROM 0-120 deg to improve ability to get in/out of car.   2.   Pt will ambulate without a device on level/unlevel surfaces > 1000' without increase in pain and demonstrate normalized gait pattern.   3.   Pt will demonstrate improved  R knee strength as evident by being able to ascend/descend stairs with reciprocal gait pattern.   4.  Pt will be have minimal complaints of increased pain with prolonged standing activities, such as cooking.   5. Pt will be independent with HEP and self management of symptoms. GOAL MET       PLAN     Pt will be treated by physical therapy 2 times a week for 8 weeks for theraputetic exercises,progressive stretching, weight bearing, and strengthening, and neuromuscular eduction to achieve established goals. Cortez may at times be seen by a PTA as part of the Rehab Team.         I certify the need for these services furnished under this plan of treatment and while under my care.        Pricilla Spencer, PT , NCS

## 2022-12-23 ENCOUNTER — CLINICAL SUPPORT (OUTPATIENT)
Dept: REHABILITATION | Facility: HOSPITAL | Age: 32
End: 2022-12-23
Payer: MEDICAID

## 2022-12-23 DIAGNOSIS — S83.203D COMPLEX TEAR OF MENISCUS OF RIGHT KNEE AS CURRENT INJURY, UNSPECIFIED MENISCUS, SUBSEQUENT ENCOUNTER: Primary | ICD-10-CM

## 2022-12-23 PROCEDURE — 97110 THERAPEUTIC EXERCISES: CPT | Mod: PN

## 2022-12-23 NOTE — PROGRESS NOTES
[facility]  Physical Therapy Discharge Note    Name: Cortez Radford  Clinic Number: [id number]    Therapy Diagnosis:   Encounter Diagnosis   Name Primary?    Complex tear of meniscus of right knee as current injury, unspecified meniscus, subsequent encounter Yes     Physician: Leila Mccullough, *    Physician Orders: eval and treat   Medical Diagnosis: S83.231A (ICD-10-CM) - Complex tear of medial meniscus of right knee as current injury, initial encounter   Evaluation Date: 11/2/2022      Date of Last visit: 12/23/2022  Total Visits Received: 16  Objective:   Observation: noted equal   Right Knee: incisions present and covered, no redness noted     Range of Motion: Knee    Left Right   Flexion:    Extension WNL 0      Strength: Knee    Left Right   Quadriceps 5/5 4/5   Hamstrings 5/5 4/5            Joint Mobility: hypomobile  Palpation: tendernes noted in medial aspect and hamstring insertion  Sensation: intact to light touch     Edema:     Left: absent  Right: absent        Gait: can ambulate >1000' on all surfaces.   Level of Assistance: independent  Patient displays poor gait mechanics, dependent on axilla support on axillary crutches  Balance: good balance strategies.  Exercises performed on this date:   Physioball bridges with extended legs and leg lift 3 x 10   Physioball heel slides 3 x10  Shuttle bilateral leg press 3 x 10 with 3 bands   Single leg shuttle press 2 x 10 with 2 bands   LE bike, level 4 for 5 min - 1.27mi    ASSESSMENT      Cortez is returning to prior level of function without any increase pain. She does note some difficulty getting off ground. Added another exercise to assist with her abilities to get on/off floor with ease.     Discharge reason: Patient has met all of his/her goals and Patient has completed allowable visits authorized by insurance    Discharge FOTO Score: see media     Goals: Short Term Goals:  1 weeks  1. Pt will demonstrate increased  knee flexion AROM to 90 degrees.  2. Pt will demonstrate increased knee extension AROM to -5 degrees.  3. Pt will ambulate with axillary crutches >1000' with improved technique and decrease pressure in axilla area.   4. Pt will demonstrate increased ham strength to 4/5.     Long Term Goals: 8 weeks  Pt will demonstrate R knee ROM 0-120 deg to improve ability to get in/out of car. MET  2.   Pt will ambulate without a device on level/unlevel surfaces > 1000' without increase in pain and demonstrate normalized gait pattern. MET  3.   Pt will demonstrate improved  R knee strength as evident by being able to ascend/descend stairs with reciprocal gait pattern. MET  4.  Pt will be have minimal complaints of increased pain with prolonged standing activities, such as cooking.MET   5. Pt will be independent with HEP and self management of symptoms.     PLAN   This patient is discharged from Physical Therapy      Pricilla Spencer, PT

## 2023-01-23 ENCOUNTER — PATIENT MESSAGE (OUTPATIENT)
Dept: REHABILITATION | Facility: HOSPITAL | Age: 33
End: 2023-01-23
Payer: MEDICAID

## 2023-08-19 ENCOUNTER — HOSPITAL ENCOUNTER (EMERGENCY)
Facility: HOSPITAL | Age: 33
Discharge: HOME | End: 2023-08-19
Attending: STUDENT IN AN ORGANIZED HEALTH CARE EDUCATION/TRAINING PROGRAM
Payer: MEDICAID

## 2023-08-19 VITALS
HEIGHT: 62 IN | HEART RATE: 79 BPM | RESPIRATION RATE: 16 BRPM | TEMPERATURE: 99 F | WEIGHT: 217.69 LBS | BODY MASS INDEX: 40.06 KG/M2 | OXYGEN SATURATION: 96 % | SYSTOLIC BLOOD PRESSURE: 98 MMHG | DIASTOLIC BLOOD PRESSURE: 56 MMHG

## 2023-08-19 DIAGNOSIS — R51.9 ACUTE NONINTRACTABLE HEADACHE, UNSPECIFIED HEADACHE TYPE: ICD-10-CM

## 2023-08-19 DIAGNOSIS — Z3A.33 33 WEEKS GESTATION OF PREGNANCY: Primary | ICD-10-CM

## 2023-08-19 LAB
BILIRUB UR QL STRIP: NEGATIVE
CLARITY UR: ABNORMAL
COLOR UR: YELLOW
GLUCOSE UR QL STRIP: NEGATIVE
HGB UR QL STRIP: NEGATIVE
KETONES UR QL STRIP: NEGATIVE
LEUKOCYTE ESTERASE UR QL STRIP: NEGATIVE
NITRITE UR QL STRIP: NEGATIVE
PH UR STRIP: >8 [PH] (ref 5–8)
PROT UR QL STRIP: NEGATIVE
SP GR UR STRIP: 1.01 (ref 1–1.03)
URN SPEC COLLECT METH UR: ABNORMAL
UROBILINOGEN UR STRIP-ACNC: 1 EU/DL

## 2023-08-19 PROCEDURE — 96360 HYDRATION IV INFUSION INIT: CPT

## 2023-08-19 PROCEDURE — 25000003 PHARM REV CODE 250: Performed by: STUDENT IN AN ORGANIZED HEALTH CARE EDUCATION/TRAINING PROGRAM

## 2023-08-19 PROCEDURE — 99284 EMERGENCY DEPT VISIT MOD MDM: CPT

## 2023-08-19 PROCEDURE — 81003 URINALYSIS AUTO W/O SCOPE: CPT | Performed by: STUDENT IN AN ORGANIZED HEALTH CARE EDUCATION/TRAINING PROGRAM

## 2023-08-19 RX ORDER — ACETAMINOPHEN 500 MG
1000 TABLET ORAL
Status: COMPLETED | OUTPATIENT
Start: 2023-08-19 | End: 2023-08-19

## 2023-08-19 RX ADMIN — ACETAMINOPHEN 1000 MG: 500 TABLET ORAL at 04:08

## 2023-08-19 RX ADMIN — SODIUM CHLORIDE 1000 ML: 9 INJECTION, SOLUTION INTRAVENOUS at 04:08

## 2023-08-19 NOTE — ED PROVIDER NOTES
Encounter Date: 8/19/2023       History     Chief Complaint   Patient presents with    Headache     T c/c of headache, back pain since yesterday (after riding in a boat).  Report burning sensation and urgency to urinate.  Denies CP, SOB, N/V.  Pt is 33 weeks pregnant.      32 year old female who is 33 weeks pregnant with a PMHx of anxiety, GERD, depression presents to the ED with back pain, headache, dysuria. She is with her . She was on a boat in the hot sun yesterday. She was wearing a hat and was drinking lots of fluids. Afterwards, she has been dealing with a headache. No head trauma, no numbness, no weakness. Patient took ibuprofen for her headache. She also states later yesterday, her son was falling from the stairs so she reached out to grab him. Didn't fall. States her left lower back started hurting more and more. No vaginal bleeding, discharge.    Fetal heart tones by RN 150s.        Review of patient's allergies indicates:  No Known Allergies  Past Medical History:   Diagnosis Date    Allergy     Anxiety disorder, unspecified     Depression     GERD (gastroesophageal reflux disease)     Thyroid dysfunction      Past Surgical History:   Procedure Laterality Date    ADENOIDECTOMY      ARTHROSCOPIC CHONDROPLASTY OF KNEE JOINT Right 10/24/2022    Procedure: ARTHROSCOPY, KNEE, WITH CHONDROPLASTY;  Surgeon: Jamel Pierson MD;  Location: [facility] OR;  Service: Orthopedics;  Laterality: Right;    CHOLECYSTECTOMY      KNEE ARTHROSCOPY W/ MENISCECTOMY Right 10/24/2022    Procedure: ARTHROSCOPY, KNEE, WITH MENISCECTOMY;  Surgeon: Jamel Pierson MD;  Location: [facility] OR;  Service: Orthopedics;  Laterality: Right;  LINVATEC CONFIRMED 10/21/DME    REPAIR OF MENISCUS OF KNEE  10/24/2022    Procedure: REPAIR, MENISCUS, KNEE;  Surgeon: Jamel Pierson MD;  Location: [facility] OR;  Service: Orthopedics;;    Right knee medial meniscus repair  10/24/2022    TONSILLECTOMY       Family History   Problem Relation Age of  Onset    Cancer Mother         ovarian cancer    Hypertension Father     Diabetes Father      Social History     Tobacco Use    Smoking status: Every Day     Current packs/day: 1.00     Average packs/day: 1 pack/day for 9.2 years (9.2 ttl pk-yrs)     Types: Cigarettes     Start date: 6/8/2014    Smokeless tobacco: Never   Substance Use Topics    Alcohol use: Yes     Comment: once every few months    Drug use: Yes     Frequency: 1.0 times per week     Types: Marijuana     Comment: once a week     Review of Systems   Constitutional:  Negative for chills and fever.   HENT:  Negative for congestion, rhinorrhea and sneezing.    Eyes:  Negative for discharge and redness.   Respiratory:  Negative for cough and shortness of breath.    Cardiovascular:  Negative for chest pain and palpitations.   Gastrointestinal:  Negative for abdominal pain, diarrhea, nausea and vomiting.   Genitourinary:  Positive for dysuria and urgency. Negative for frequency, vaginal bleeding and vaginal discharge.   Musculoskeletal:  Positive for back pain. Negative for neck pain.   Skin:  Negative for rash and wound.   Neurological:  Positive for headaches. Negative for weakness and numbness.       Physical Exam     Initial Vitals [08/19/23 1604]   BP Pulse Resp Temp SpO2   106/66 95 16 99 °F (37.2 °C) 97 %      MAP       --         Physical Exam    Nursing note and vitals reviewed.  Constitutional: She appears well-developed. She is not diaphoretic. No distress.   HENT:   Head: Normocephalic and atraumatic.   Right Ear: External ear normal.   Left Ear: External ear normal.   Eyes: Right eye exhibits no discharge. Left eye exhibits no discharge. No scleral icterus.   Neck: Neck supple.   Cardiovascular:  Normal rate and regular rhythm.           Pulmonary/Chest: Breath sounds normal. No stridor. No respiratory distress. She has no wheezes. She has no rhonchi. She has no rales.   Abdominal: Abdomen is soft. There is no abdominal tenderness.   Gravid  abdomen   No right CVA tenderness.  No left CVA tenderness. There is no guarding.   Musculoskeletal:         General: No edema.      Cervical back: Neck supple. No bony tenderness.      Thoracic back: Tenderness (upper thoracic, left) present. No bony tenderness.      Lumbar back: Tenderness (left) present. No bony tenderness.     Neurological: She is alert and oriented to person, place, and time. She has normal strength. No cranial nerve deficit or sensory deficit. GCS eye subscore is 4. GCS verbal subscore is 5. GCS motor subscore is 6.   Skin: Skin is warm and dry. Capillary refill takes less than 2 seconds.   Psychiatric: She has a normal mood and affect.         ED Course   Procedures  Labs Reviewed   URINALYSIS, REFLEX TO URINE CULTURE - Abnormal; Notable for the following components:       Result Value    Appearance, UA Hazy (*)     All other components within normal limits    Narrative:     Specimen Source->Urine          Imaging Results    None          Medications   sodium chloride 0.9% bolus 1,000 mL 1,000 mL (0 mLs Intravenous Stopped 8/19/23 1736)   acetaminophen tablet 1,000 mg (1,000 mg Oral Given 8/19/23 1632)     Medical Decision Making  Ddx: UTI, heat exhaustion, dehydration, migraines, SAH    Based on the patient's evaluation - patient appears well for discharge home. Negative UA. Well appearing with normal neuro exam. Headache improved with tylenol, IV fluids. Informed to STOP ibuprofen during pregnancy. Ice, heat, stretch, massage, tylenol for back pain and headache. Patient is in agreement.    Amount and/or Complexity of Data Reviewed  Independent Historian: spouse  Labs: ordered.    Risk  OTC drugs.                               Clinical Impression:   Final diagnoses:  [R51.9] Acute nonintractable headache, unspecified headache type  [Z3A.33] 33 weeks gestation of pregnancy (Primary)        ED Disposition Condition    Discharge Stable          ED Prescriptions    None       Follow-up  Information       Follow up With Specialties Details Why Contact [facility]    Gabrielle, Start Internal Medicine Schedule an appointment as soon as possible for a visit in 5 days  [address] [place] [address]  [place] [address]  [phone]      [facility] - Emergency Dept Emergency Medicine Go to  If symptoms worsen [address] [facility] [address]  [phone]             Randolph Nevarez DO  08/19/23 5058

## 2023-09-13 ENCOUNTER — TELEPHONE (OUTPATIENT)
Dept: OBSTETRICS AND GYNECOLOGY | Facility: CLINIC | Age: 33
End: 2023-09-13
Payer: MEDICAID

## 2023-09-13 NOTE — TELEPHONE ENCOUNTER
Cortez Radford  MRN: [id number]    Home Phone [phone]   Work Phone Not on file.   Mobile [phone]     Patient Care Team:  Gabrielle, Start as PCP - General (Internal Medicine)  Moises Lara MD as Obstetrician (Obstetrics and Gynecology)  OB? Yes, 36w3d  What phone number can you be reached at? [phone]  Message: Pt is 36w3d pregnant and just moved back to the area and would like to transfer care here.

## 2023-09-15 ENCOUNTER — OFFICE VISIT (OUTPATIENT)
Dept: OBSTETRICS AND GYNECOLOGY | Facility: CLINIC | Age: 33
End: 2023-09-15
Attending: OBSTETRICS & GYNECOLOGY
Payer: MEDICAID

## 2023-09-15 VITALS
HEIGHT: 62 IN | HEART RATE: 77 BPM | RESPIRATION RATE: 18 BRPM | WEIGHT: 222 LBS | BODY MASS INDEX: 40.85 KG/M2 | DIASTOLIC BLOOD PRESSURE: 64 MMHG | SYSTOLIC BLOOD PRESSURE: 114 MMHG | OXYGEN SATURATION: 96 %

## 2023-09-15 DIAGNOSIS — Z36.89 ENCOUNTER FOR ULTRASOUND TO ASSESS FETAL GROWTH: ICD-10-CM

## 2023-09-15 DIAGNOSIS — Z87.59 HISTORY OF POSTPARTUM HEMORRHAGE: ICD-10-CM

## 2023-09-15 DIAGNOSIS — Z3A.36 36 WEEKS GESTATION OF PREGNANCY: ICD-10-CM

## 2023-09-15 DIAGNOSIS — Z34.83 ENCOUNTER FOR SUPERVISION OF OTHER NORMAL PREGNANCY IN THIRD TRIMESTER: Primary | ICD-10-CM

## 2023-09-15 DIAGNOSIS — Z23 NEED FOR TDAP VACCINATION: ICD-10-CM

## 2023-09-15 PROBLEM — S83.205A: Status: RESOLVED | Noted: 2022-11-15 | Resolved: 2023-09-15

## 2023-09-15 PROCEDURE — 99213 OFFICE O/P EST LOW 20 MIN: CPT | Mod: PBBFAC,TH | Performed by: OBSTETRICS & GYNECOLOGY

## 2023-09-15 PROCEDURE — 99999PBSHW TDAP VACCINE GREATER THAN OR EQUAL TO 7YO IM: ICD-10-PCS | Mod: PBBFAC,,,

## 2023-09-15 PROCEDURE — 3074F PR MOST RECENT SYSTOLIC BLOOD PRESSURE < 130 MM HG: ICD-10-PCS | Mod: CPTII,,, | Performed by: OBSTETRICS & GYNECOLOGY

## 2023-09-15 PROCEDURE — 90471 IMMUNIZATION ADMIN: CPT | Mod: PBBFAC

## 2023-09-15 PROCEDURE — 1159F MED LIST DOCD IN RCRD: CPT | Mod: CPTII,,, | Performed by: OBSTETRICS & GYNECOLOGY

## 2023-09-15 PROCEDURE — 3008F PR BODY MASS INDEX (BMI) DOCUMENTED: ICD-10-PCS | Mod: CPTII,,, | Performed by: OBSTETRICS & GYNECOLOGY

## 2023-09-15 PROCEDURE — 99204 OFFICE O/P NEW MOD 45 MIN: CPT | Mod: 25,S$PBB,TH, | Performed by: OBSTETRICS & GYNECOLOGY

## 2023-09-15 PROCEDURE — 3074F SYST BP LT 130 MM HG: CPT | Mod: CPTII,,, | Performed by: OBSTETRICS & GYNECOLOGY

## 2023-09-15 PROCEDURE — 1159F PR MEDICATION LIST DOCUMENTED IN MEDICAL RECORD: ICD-10-PCS | Mod: CPTII,,, | Performed by: OBSTETRICS & GYNECOLOGY

## 2023-09-15 PROCEDURE — 99999 PR PBB SHADOW E&M-EST. PATIENT-LVL III: ICD-10-PCS | Mod: PBBFAC,,, | Performed by: OBSTETRICS & GYNECOLOGY

## 2023-09-15 PROCEDURE — 87077 CULTURE AEROBIC IDENTIFY: CPT | Performed by: OBSTETRICS & GYNECOLOGY

## 2023-09-15 PROCEDURE — 99999 PR PBB SHADOW E&M-EST. PATIENT-LVL III: CPT | Mod: PBBFAC,,, | Performed by: OBSTETRICS & GYNECOLOGY

## 2023-09-15 PROCEDURE — 3008F BODY MASS INDEX DOCD: CPT | Mod: CPTII,,, | Performed by: OBSTETRICS & GYNECOLOGY

## 2023-09-15 PROCEDURE — 87081 CULTURE SCREEN ONLY: CPT | Performed by: OBSTETRICS & GYNECOLOGY

## 2023-09-15 PROCEDURE — 90715 TDAP VACCINE 7 YRS/> IM: CPT | Mod: PBBFAC

## 2023-09-15 PROCEDURE — 3078F DIAST BP <80 MM HG: CPT | Mod: CPTII,,, | Performed by: OBSTETRICS & GYNECOLOGY

## 2023-09-15 PROCEDURE — 3078F PR MOST RECENT DIASTOLIC BLOOD PRESSURE < 80 MM HG: ICD-10-PCS | Mod: CPTII,,, | Performed by: OBSTETRICS & GYNECOLOGY

## 2023-09-15 PROCEDURE — 99204 PR OFFICE/OUTPT VISIT, NEW, LEVL IV, 45-59 MIN: ICD-10-PCS | Mod: 25,S$PBB,TH, | Performed by: OBSTETRICS & GYNECOLOGY

## 2023-09-15 PROCEDURE — 99999PBSHW TDAP VACCINE GREATER THAN OR EQUAL TO 7YO IM: Mod: PBBFAC,,,

## 2023-09-15 RX ORDER — LACTULOSE 10 G/15ML
SOLUTION ORAL
COMMUNITY
Start: 2023-09-12 | End: 2023-09-15

## 2023-09-15 RX ORDER — CALCIUM POLYCARBOPHIL 625 MG/1
2 TABLET ORAL 2 TIMES DAILY
COMMUNITY
Start: 2023-09-12 | End: 2023-09-15

## 2023-09-15 RX ORDER — FERROUS SULFATE 325(65) MG
TABLET ORAL EVERY OTHER DAY
COMMUNITY
Start: 2023-09-12 | End: 2023-09-15

## 2023-09-15 NOTE — PROGRESS NOTES
Subjective:   Patient ID: Cortez Radford is a 32 y.o. y.o. female.     Chief Complaint: Missed Menses       History of Present Illness:    Cortez presents today complaining of amenorrhea. Patient's last menstrual period was 09/12/2022 (exact date).. Prior menstrual cycles have been regular. She reports positive home pregnancy test. UPT is positive.     Patient is a transfer of care. She reports pregnancy has been uncomplicated. Two prior term SVDs. Most records are available.       Past Medical History:   Diagnosis Date    Allergy     Anxiety disorder, unspecified     Depression     GERD (gastroesophageal reflux disease)     Thyroid dysfunction      Past Surgical History:   Procedure Laterality Date    ADENOIDECTOMY      ARTHROSCOPIC CHONDROPLASTY OF KNEE JOINT Right 10/24/2022    Procedure: ARTHROSCOPY, KNEE, WITH CHONDROPLASTY;  Surgeon: Jamel Pierson MD;  Location: [facility] OR;  Service: Orthopedics;  Laterality: Right;    CHOLECYSTECTOMY      KNEE ARTHROSCOPY W/ MENISCECTOMY Right 10/24/2022    Procedure: ARTHROSCOPY, KNEE, WITH MENISCECTOMY;  Surgeon: Jamel Pierson MD;  Location: [facility] OR;  Service: Orthopedics;  Laterality: Right;  LINVATEC CONFIRMED 10/21/DME    REPAIR OF MENISCUS OF KNEE  10/24/2022    Procedure: REPAIR, MENISCUS, KNEE;  Surgeon: Jamel Pierson MD;  Location: [facility] OR;  Service: Orthopedics;;    Right knee medial meniscus repair  10/24/2022    TONSILLECTOMY       Social History     Socioeconomic History    Marital status:    Tobacco Use    Smoking status: Every Day     Current packs/day: 1.00     Average packs/day: 1 pack/day for 9.3 years (9.3 ttl pk-yrs)     Types: Cigarettes     Start date: 6/8/2014    Smokeless tobacco: Never   Substance and Sexual Activity    Alcohol use: Not Currently     Comment: once every few months    Drug use: Yes     Frequency: 1.0 times per week     Types: Marijuana     Comment: once a week    Sexual activity: Yes     Partners: Male     Comment: has  20 month old baby     Social Determinants of Health     Financial Resource Strain: Medium Risk (3/28/2022)    Overall Financial Resource Strain (CARDIA)     Difficulty of Paying Living Expenses: Somewhat hard   Food Insecurity: No Food Insecurity (3/28/2022)    Hunger Vital Sign     Worried About Running Out of Food in the Last Year: Never true     Ran Out of Food in the Last Year: Never true   Transportation Needs: No Transportation Needs (3/28/2022)    PRAPARE - Transportation     Lack of Transportation (Medical): No     Lack of Transportation (Non-Medical): No   Physical Activity: Unknown (3/28/2022)    Exercise Vital Sign     Days of Exercise per Week: 1 day   Stress: Stress Concern Present (3/28/2022)    [employer] - Occupational Stress Questionnaire     Feeling of Stress : Very much     Family History   Problem Relation Age of Onset    Ovarian cancer Maternal Grandmother     Hypertension Father     Diabetes Father     Ovarian cancer Mother     Cancer Mother         ovarian cancer    Cervical cancer Maternal Aunt     Breast cancer Neg Hx     Uterine cancer Neg Hx      OB History    Para Term  AB Living   3 2 2     2   SAB IAB Ectopic Multiple Live Births           2      # Outcome Date GA Lbr Brenden/2nd Weight Sex Delivery Anes PTL Lv   3 Current            2 Term 2020   3.175 kg (7 lb) M Vag-Spont   LIZETH   1 Term 2009   2.892 kg (6 lb 6 oz) F Vag-Spont   LIZETH         ROS:   Constitutional: Negative for chills, fever and weight loss.   HENT: Negative for congestion, ear discharge, hearing loss and nosebleeds.   Eyes: Negative for blurred vision and double vision.   Respiratory: Negative for cough, hemoptysis, sputum production and shortness of breath.   Cardiovascular: Negative for chest pain, palpitations and orthopnea.   Gastrointestinal: Negative for abdominal pain, heartburn, nausea and vomiting.   Genitourinary: Negative for dysuria, frequency, hematuria and  urgency.   Musculoskeletal: Negative for back pain, myalgias and neck pain.   Skin: Negative for itching and rash.   Neurological: Negative for dizziness, tingling, tremors and headaches.   Endo/Heme/Allergies: Negative for environmental allergies and polydipsia. Does not bruise/bleed easily.   Psychiatric/Behavioral: Negative for depression, hallucinations and substance abuse. The patient is not nervous/anxious.       Objective:   Vital Signs:  Vitals:    09/15/23 1435   BP: 114/64   Pulse: 77   Resp: 18         Physical Exam:  General:  alert, cooperative, appears stated age   Skin:  Skin color, texture, turgor normal. No rashes or lesions   HEENT:  conjunctivae/corneas clear. PERRL.   Neck: supple, trachea midline, no adenopathy or thyromegally   Respiratory:  clear to auscultation bilaterally   Heart:  regular rate and rhythm, S1, S2 normal, no murmur, click, rub or gallop   Breasts:   deferred   Abdomen:  normal findings: bowel sounds normal, no masses palpable, no organomegaly and soft, non-tender   Pelvis: deferred   Extremities: Normal ROM; no edema, no cyanosis   Neurologial: Normal strength and tone. No focal numbness or weakness. Reflexes 2+ and equal.   Psychiatric: normal mood, speech, dress, and thought processes     Assessment:      1. Encounter for supervision of other normal pregnancy in third trimester    2. History of postpartum hemorrhage    3. Encounter for ultrasound to assess fetal growth    4. 36 weeks gestation of pregnancy    5. Need for Tdap vaccination          Plan:      Encounter for supervision of other normal pregnancy in third trimester    History of postpartum hemorrhage    Encounter for ultrasound to assess fetal growth  -     US OB/GYN Procedure ([facility]) - Extended List - Future; Future    36 weeks gestation of pregnancy  -     STREP B SCREEN, VAGINAL / RECTAL    Need for Tdap vaccination  -     Tdap Vaccine      Patient was counseled today on proper weight gain based on the  [place] [facility]'s recommendations based on her pre-pregnancy weight. Discussed foods to avoid in pregnancy (i.e. sushi, fish that are high in mercury, deli meat, and unpasteurized cheeses). Discussed prenatal vitamin options (i.e. stool softener, DHA). She was also counseled on safe, healthy behavior as well as medications safe in pregnancy.

## 2023-09-18 LAB — BACTERIA SPEC AEROBE CULT: NORMAL

## 2023-09-21 ENCOUNTER — PROCEDURE VISIT (OUTPATIENT)
Dept: OBSTETRICS AND GYNECOLOGY | Facility: CLINIC | Age: 33
End: 2023-09-21
Payer: MEDICAID

## 2023-09-21 ENCOUNTER — ROUTINE PRENATAL (OUTPATIENT)
Dept: OBSTETRICS AND GYNECOLOGY | Facility: CLINIC | Age: 33
End: 2023-09-21
Payer: MEDICAID

## 2023-09-21 VITALS
DIASTOLIC BLOOD PRESSURE: 76 MMHG | SYSTOLIC BLOOD PRESSURE: 118 MMHG | HEART RATE: 94 BPM | WEIGHT: 222.13 LBS | BODY MASS INDEX: 40.63 KG/M2

## 2023-09-21 DIAGNOSIS — Z36.89 ENCOUNTER FOR ULTRASOUND TO ASSESS FETAL GROWTH: ICD-10-CM

## 2023-09-21 DIAGNOSIS — Z34.83 ENCOUNTER FOR SUPERVISION OF OTHER NORMAL PREGNANCY IN THIRD TRIMESTER: Primary | ICD-10-CM

## 2023-09-21 DIAGNOSIS — Z3A.37 37 WEEKS GESTATION OF PREGNANCY: ICD-10-CM

## 2023-09-21 DIAGNOSIS — Z87.59 HISTORY OF POSTPARTUM HEMORRHAGE: ICD-10-CM

## 2023-09-21 PROCEDURE — 76816 OB US FOLLOW-UP PER FETUS: CPT | Mod: PBBFAC | Performed by: OBSTETRICS & GYNECOLOGY

## 2023-09-21 PROCEDURE — 99213 PR OFFICE/OUTPT VISIT, EST, LEVL III, 20-29 MIN: ICD-10-PCS | Mod: TH,S$PBB,, | Performed by: OBSTETRICS & GYNECOLOGY

## 2023-09-21 PROCEDURE — 99213 OFFICE O/P EST LOW 20 MIN: CPT | Mod: TH,S$PBB,, | Performed by: OBSTETRICS & GYNECOLOGY

## 2023-09-21 PROCEDURE — 99999 PR PBB SHADOW E&M-EST. PATIENT-LVL II: CPT | Mod: PBBFAC,,, | Performed by: OBSTETRICS & GYNECOLOGY

## 2023-09-21 PROCEDURE — 99999 PR PBB SHADOW E&M-EST. PATIENT-LVL II: ICD-10-PCS | Mod: PBBFAC,,, | Performed by: OBSTETRICS & GYNECOLOGY

## 2023-09-21 PROCEDURE — 99212 OFFICE O/P EST SF 10 MIN: CPT | Mod: PBBFAC,TH | Performed by: OBSTETRICS & GYNECOLOGY

## 2023-09-21 PROCEDURE — 76816 US OB/GYN EXTENDED PROCEDURE (VIEWPOINT): ICD-10-PCS | Mod: 26,S$PBB,, | Performed by: OBSTETRICS & GYNECOLOGY

## 2023-09-21 NOTE — PROGRESS NOTES
Patient with no complaints. Denies vaginal bleeding or contractions. Good FM. RTC in 1 week.     Growth scan today; AGA, vertex, normal MVP. BPP 8/8.    Vitals signs, FHTs, urine dip, and PE findings documented, reviewed and available in OB flow chart.       I spent a total of 20 minutes on the day of the visit.This includes face to face time and non-face to face time preparing to see the patient (eg, review of tests), Obtaining and/or reviewing separately obtained history, Documenting clinical information in the electronic or other health record, Independently interpreting resultsand communicating results to the patient/family/caregiver, or Care coordination.

## 2023-09-26 ENCOUNTER — ROUTINE PRENATAL (OUTPATIENT)
Dept: OBSTETRICS AND GYNECOLOGY | Facility: CLINIC | Age: 33
End: 2023-09-26
Payer: MEDICAID

## 2023-09-26 VITALS
BODY MASS INDEX: 41.14 KG/M2 | DIASTOLIC BLOOD PRESSURE: 74 MMHG | HEART RATE: 94 BPM | WEIGHT: 224.94 LBS | SYSTOLIC BLOOD PRESSURE: 118 MMHG

## 2023-09-26 DIAGNOSIS — Z87.59 HISTORY OF POSTPARTUM HEMORRHAGE: ICD-10-CM

## 2023-09-26 DIAGNOSIS — Z3A.38 38 WEEKS GESTATION OF PREGNANCY: ICD-10-CM

## 2023-09-26 DIAGNOSIS — Z34.83 ENCOUNTER FOR SUPERVISION OF OTHER NORMAL PREGNANCY IN THIRD TRIMESTER: Primary | ICD-10-CM

## 2023-09-26 DIAGNOSIS — Z30.017 ENCOUNTER FOR INITIAL PRESCRIPTION OF IMPLANTABLE SUBDERMAL CONTRACEPTIVE: ICD-10-CM

## 2023-09-26 PROCEDURE — 99213 PR OFFICE/OUTPT VISIT, EST, LEVL III, 20-29 MIN: ICD-10-PCS | Mod: TH,S$PBB,, | Performed by: OBSTETRICS & GYNECOLOGY

## 2023-09-26 PROCEDURE — 99999 PR PBB SHADOW E&M-EST. PATIENT-LVL III: CPT | Mod: PBBFAC,,, | Performed by: OBSTETRICS & GYNECOLOGY

## 2023-09-26 PROCEDURE — 99999 PR PBB SHADOW E&M-EST. PATIENT-LVL III: ICD-10-PCS | Mod: PBBFAC,,, | Performed by: OBSTETRICS & GYNECOLOGY

## 2023-09-26 PROCEDURE — 99213 OFFICE O/P EST LOW 20 MIN: CPT | Mod: PBBFAC,TH | Performed by: OBSTETRICS & GYNECOLOGY

## 2023-09-26 PROCEDURE — 99213 OFFICE O/P EST LOW 20 MIN: CPT | Mod: TH,S$PBB,, | Performed by: OBSTETRICS & GYNECOLOGY

## 2023-09-26 RX ORDER — PROCHLORPERAZINE EDISYLATE 5 MG/ML
5 INJECTION INTRAMUSCULAR; INTRAVENOUS EVERY 6 HOURS PRN
Status: SHIPPED | OUTPATIENT
Start: 2023-09-26

## 2023-09-26 RX ORDER — SODIUM CHLORIDE 9 MG/ML
INJECTION, SOLUTION INTRAVENOUS
Status: CANCELLED | OUTPATIENT
Start: 2023-09-26

## 2023-09-26 RX ORDER — LAMOTRIGINE 25 MG/1
TABLET ORAL
COMMUNITY
Start: 2022-12-02

## 2023-09-26 RX ORDER — OXYTOCIN/RINGER'S LACTATE 30/500 ML
95 PLASTIC BAG, INJECTION (ML) INTRAVENOUS CONTINUOUS
Status: SHIPPED | OUTPATIENT
Start: 2023-09-26 | End: 2023-09-26

## 2023-09-26 RX ORDER — SIMETHICONE 80 MG
1 TABLET,CHEWABLE ORAL 4 TIMES DAILY PRN
Status: SHIPPED | OUTPATIENT
Start: 2023-09-26

## 2023-09-26 RX ORDER — OXYTOCIN/RINGER'S LACTATE 30/500 ML
0-30 PLASTIC BAG, INJECTION (ML) INTRAVENOUS CONTINUOUS
Status: SHIPPED | OUTPATIENT
Start: 2023-09-26

## 2023-09-26 RX ORDER — METHYLERGONOVINE MALEATE 0.2 MG/ML
200 INJECTION INTRAVENOUS
Status: DISCONTINUED | OUTPATIENT
Start: 2023-09-26 | End: 2023-10-04 | Stop reason: HOSPADM

## 2023-09-26 RX ORDER — MISOPROSTOL 100 UG/1
800 TABLET ORAL ONCE AS NEEDED
Status: DISCONTINUED | OUTPATIENT
Start: 2023-09-26 | End: 2023-10-04 | Stop reason: HOSPADM

## 2023-09-26 RX ORDER — EZETIMIBE 10 MG/1
TABLET ORAL
COMMUNITY
Start: 2022-11-28

## 2023-09-26 RX ORDER — DIPHENOXYLATE HYDROCHLORIDE AND ATROPINE SULFATE 2.5; .025 MG/1; MG/1
2 TABLET ORAL EVERY 6 HOURS PRN
Status: DISCONTINUED | OUTPATIENT
Start: 2023-09-26 | End: 2023-10-04 | Stop reason: HOSPADM

## 2023-09-26 RX ORDER — METOCLOPRAMIDE 5 MG/1
5 TABLET ORAL 4 TIMES DAILY
COMMUNITY
Start: 2023-08-22

## 2023-09-26 RX ORDER — CARBOPROST TROMETHAMINE 250 UG/ML
250 INJECTION, SOLUTION INTRAMUSCULAR
Status: DISCONTINUED | OUTPATIENT
Start: 2023-09-26 | End: 2023-10-04 | Stop reason: HOSPADM

## 2023-09-26 RX ORDER — LIDOCAINE HYDROCHLORIDE 10 MG/ML
10 INJECTION INFILTRATION; PERINEURAL ONCE AS NEEDED
Status: DISCONTINUED | OUTPATIENT
Start: 2023-09-26 | End: 2023-10-04 | Stop reason: HOSPADM

## 2023-09-26 RX ORDER — CALCIUM CARBONATE 200(500)MG
500 TABLET,CHEWABLE ORAL 3 TIMES DAILY PRN
Status: DISCONTINUED | OUTPATIENT
Start: 2023-09-26 | End: 2023-10-04 | Stop reason: HOSPADM

## 2023-09-26 RX ORDER — SODIUM CHLORIDE, SODIUM LACTATE, POTASSIUM CHLORIDE, CALCIUM CHLORIDE 600; 310; 30; 20 MG/100ML; MG/100ML; MG/100ML; MG/100ML
INJECTION, SOLUTION INTRAVENOUS CONTINUOUS
Status: DISCONTINUED | OUTPATIENT
Start: 2023-09-26 | End: 2023-10-04 | Stop reason: HOSPADM

## 2023-09-26 RX ORDER — LOPERAMIDE HYDROCHLORIDE 2 MG/1
CAPSULE ORAL
COMMUNITY
Start: 2023-07-24

## 2023-09-26 RX ORDER — OXYTOCIN/RINGER'S LACTATE 30/500 ML
334 PLASTIC BAG, INJECTION (ML) INTRAVENOUS ONCE AS NEEDED
Status: SHIPPED | OUTPATIENT
Start: 2023-09-26 | End: 2035-02-22

## 2023-09-26 RX ORDER — BUTALBITAL AND ACETAMINOPHEN 325; 50 MG/1; MG/1
TABLET ORAL
COMMUNITY

## 2023-09-26 RX ORDER — ATORVASTATIN CALCIUM 20 MG/1
TABLET, FILM COATED ORAL
COMMUNITY
Start: 2022-11-21

## 2023-09-26 RX ORDER — OXYTOCIN 10 [USP'U]/ML
10 INJECTION, SOLUTION INTRAMUSCULAR; INTRAVENOUS ONCE AS NEEDED
Status: SHIPPED | OUTPATIENT
Start: 2023-09-26 | End: 2035-02-22

## 2023-09-26 RX ORDER — BUTORPHANOL TARTRATE 1 MG/ML
1 INJECTION INTRAMUSCULAR; INTRAVENOUS
Status: CANCELLED | OUTPATIENT
Start: 2023-09-26

## 2023-09-26 RX ORDER — ONDANSETRON 4 MG/1
8 TABLET, ORALLY DISINTEGRATING ORAL EVERY 8 HOURS PRN
Status: SHIPPED | OUTPATIENT
Start: 2023-09-26

## 2023-09-26 RX ORDER — OXYTOCIN/RINGER'S LACTATE 30/500 ML
95 PLASTIC BAG, INJECTION (ML) INTRAVENOUS ONCE AS NEEDED
Status: SHIPPED | OUTPATIENT
Start: 2023-09-26 | End: 2035-02-22

## 2023-09-26 RX ORDER — OXYTOCIN/RINGER'S LACTATE 30/500 ML
334 PLASTIC BAG, INJECTION (ML) INTRAVENOUS ONCE
Status: SHIPPED | OUTPATIENT
Start: 2023-09-26

## 2023-10-03 ENCOUNTER — HOSPITAL ENCOUNTER (INPATIENT)
Facility: HOSPITAL | Age: 33
LOS: 1 days | Discharge: HOME | End: 2023-10-04
Attending: OBSTETRICS & GYNECOLOGY | Admitting: OBSTETRICS & GYNECOLOGY
Payer: MEDICAID

## 2023-10-03 ENCOUNTER — ANESTHESIA (OUTPATIENT)
Dept: OBSTETRICS AND GYNECOLOGY | Facility: HOSPITAL | Age: 33
End: 2023-10-03
Payer: MEDICAID

## 2023-10-03 ENCOUNTER — ANESTHESIA EVENT (OUTPATIENT)
Dept: OBSTETRICS AND GYNECOLOGY | Facility: HOSPITAL | Age: 33
End: 2023-10-03
Payer: MEDICAID

## 2023-10-03 DIAGNOSIS — Z34.83 ENCOUNTER FOR SUPERVISION OF OTHER NORMAL PREGNANCY IN THIRD TRIMESTER: ICD-10-CM

## 2023-10-03 PROBLEM — Z34.90 ENCOUNTER FOR ELECTIVE INDUCTION OF LABOR: Status: ACTIVE | Noted: 2023-10-03

## 2023-10-03 LAB
ABO + RH BLD: NORMAL
BASOPHILS # BLD AUTO: 0.02 K/UL (ref 0–0.2)
BASOPHILS NFR BLD: 0.2 % (ref 0–1.9)
BLD GP AB SCN CELLS X3 SERPL QL: NORMAL
DIFFERENTIAL METHOD: ABNORMAL
EOSINOPHIL # BLD AUTO: 0.1 K/UL (ref 0–0.5)
EOSINOPHIL NFR BLD: 0.8 % (ref 0–8)
ERYTHROCYTE [DISTWIDTH] IN BLOOD BY AUTOMATED COUNT: 13.7 % (ref 11.5–14.5)
HCT VFR BLD AUTO: 38.4 % (ref 37–48.5)
HGB BLD-MCNC: 13 G/DL (ref 12–16)
IMM GRANULOCYTES # BLD AUTO: 0.03 K/UL (ref 0–0.04)
IMM GRANULOCYTES NFR BLD AUTO: 0.4 % (ref 0–0.5)
LYMPHOCYTES # BLD AUTO: 2.3 K/UL (ref 1–4.8)
LYMPHOCYTES NFR BLD: 27.1 % (ref 18–48)
MCH RBC QN AUTO: 31.2 PG (ref 27–31)
MCHC RBC AUTO-ENTMCNC: 33.9 G/DL (ref 32–36)
MCV RBC AUTO: 92 FL (ref 82–98)
MONOCYTES # BLD AUTO: 0.6 K/UL (ref 0.3–1)
MONOCYTES NFR BLD: 6.5 % (ref 4–15)
NEUTROPHILS # BLD AUTO: 5.5 K/UL (ref 1.8–7.7)
NEUTROPHILS NFR BLD: 65 % (ref 38–73)
NRBC BLD-RTO: 0 /100 WBC
PLATELET # BLD AUTO: 123 K/UL (ref 150–450)
PMV BLD AUTO: 11.7 FL (ref 9.2–12.9)
RBC # BLD AUTO: 4.17 M/UL (ref 4–5.4)
SPECIMEN OUTDATE: NORMAL
WBC # BLD AUTO: 8.51 K/UL (ref 3.9–12.7)

## 2023-10-03 PROCEDURE — 86850 RBC ANTIBODY SCREEN: CPT | Performed by: OBSTETRICS & GYNECOLOGY

## 2023-10-03 PROCEDURE — 72200005 HC VAGINAL DELIVERY LEVEL II

## 2023-10-03 PROCEDURE — 11000001 HC ACUTE MED/SURG PRIVATE ROOM

## 2023-10-03 PROCEDURE — 63600175 PHARM REV CODE 636 W HCPCS: Performed by: OBSTETRICS & GYNECOLOGY

## 2023-10-03 PROCEDURE — 51702 INSERT TEMP BLADDER CATH: CPT

## 2023-10-03 PROCEDURE — 86592 SYPHILIS TEST NON-TREP QUAL: CPT | Performed by: OBSTETRICS & GYNECOLOGY

## 2023-10-03 PROCEDURE — 01967 NEURAXL LBR ANES VAG DLVR: CPT | Mod: QZ,P2 | Performed by: NURSE ANESTHETIST, CERTIFIED REGISTERED

## 2023-10-03 PROCEDURE — 72100002 HC LABOR CARE, 1ST 8 HOURS

## 2023-10-03 PROCEDURE — 36415 COLL VENOUS BLD VENIPUNCTURE: CPT | Performed by: OBSTETRICS & GYNECOLOGY

## 2023-10-03 PROCEDURE — 59409 OBSTETRICAL CARE: CPT | Mod: GB,,, | Performed by: OBSTETRICS & GYNECOLOGY

## 2023-10-03 PROCEDURE — 59409AH PRA ETRICAL CARE,VAG DELIV ONLY: Mod: QZ,P2 | Performed by: NURSE ANESTHETIST, CERTIFIED REGISTERED

## 2023-10-03 PROCEDURE — 25000003 PHARM REV CODE 250: Performed by: OBSTETRICS & GYNECOLOGY

## 2023-10-03 PROCEDURE — 59409 PR OBSTETRICAL CARE,VAG DELIV ONLY: ICD-10-PCS | Mod: GB,,, | Performed by: OBSTETRICS & GYNECOLOGY

## 2023-10-03 PROCEDURE — 85025 COMPLETE CBC W/AUTO DIFF WBC: CPT | Performed by: OBSTETRICS & GYNECOLOGY

## 2023-10-03 PROCEDURE — 63600175 PHARM REV CODE 636 W HCPCS: Performed by: NURSE ANESTHETIST, CERTIFIED REGISTERED

## 2023-10-03 PROCEDURE — 62326 NJX INTERLAMINAR LMBR/SAC: CPT | Performed by: NURSE ANESTHETIST, CERTIFIED REGISTERED

## 2023-10-03 RX ORDER — ROPIVACAINE HYDROCHLORIDE 2 MG/ML
INJECTION, SOLUTION EPIDURAL; INFILTRATION; PERINEURAL
Status: COMPLETED
Start: 2023-10-03 | End: 2023-10-03

## 2023-10-03 RX ORDER — ROPIVACAINE HYDROCHLORIDE 5 MG/ML
INJECTION, SOLUTION EPIDURAL; INFILTRATION; PERINEURAL
Status: COMPLETED
Start: 2023-10-03 | End: 2023-10-03

## 2023-10-03 RX ORDER — SIMETHICONE 80 MG
1 TABLET,CHEWABLE ORAL 4 TIMES DAILY PRN
Status: DISCONTINUED | OUTPATIENT
Start: 2023-10-03 | End: 2023-10-03

## 2023-10-03 RX ORDER — SIMETHICONE 80 MG
1 TABLET,CHEWABLE ORAL EVERY 6 HOURS PRN
Status: DISCONTINUED | OUTPATIENT
Start: 2023-10-03 | End: 2023-10-04 | Stop reason: HOSPADM

## 2023-10-03 RX ORDER — OXYTOCIN/RINGER'S LACTATE 30/500 ML
95 PLASTIC BAG, INJECTION (ML) INTRAVENOUS ONCE AS NEEDED
Status: DISCONTINUED | OUTPATIENT
Start: 2023-10-03 | End: 2023-10-04 | Stop reason: HOSPADM

## 2023-10-03 RX ORDER — CALCIUM CARBONATE 200(500)MG
500 TABLET,CHEWABLE ORAL 3 TIMES DAILY PRN
Status: DISCONTINUED | OUTPATIENT
Start: 2023-10-03 | End: 2023-10-03

## 2023-10-03 RX ORDER — HYDROCORTISONE 25 MG/G
CREAM TOPICAL 3 TIMES DAILY PRN
Status: DISCONTINUED | OUTPATIENT
Start: 2023-10-03 | End: 2023-10-04 | Stop reason: HOSPADM

## 2023-10-03 RX ORDER — HYDROCODONE BITARTRATE AND ACETAMINOPHEN 10; 325 MG/1; MG/1
1 TABLET ORAL EVERY 4 HOURS PRN
Status: DISCONTINUED | OUTPATIENT
Start: 2023-10-03 | End: 2023-10-04 | Stop reason: HOSPADM

## 2023-10-03 RX ORDER — ONDANSETRON 8 MG/1
8 TABLET, ORALLY DISINTEGRATING ORAL EVERY 8 HOURS PRN
Status: DISCONTINUED | OUTPATIENT
Start: 2023-10-03 | End: 2023-10-04 | Stop reason: HOSPADM

## 2023-10-03 RX ORDER — FENTANYL CITRATE 50 UG/ML
INJECTION, SOLUTION INTRAMUSCULAR; INTRAVENOUS
Status: COMPLETED
Start: 2023-10-03 | End: 2023-10-03

## 2023-10-03 RX ORDER — MISOPROSTOL 200 UG/1
800 TABLET ORAL ONCE AS NEEDED
Status: DISCONTINUED | OUTPATIENT
Start: 2023-10-03 | End: 2023-10-04 | Stop reason: HOSPADM

## 2023-10-03 RX ORDER — TRANEXAMIC ACID 10 MG/ML
1000 INJECTION, SOLUTION INTRAVENOUS EVERY 30 MIN PRN
Status: DISCONTINUED | OUTPATIENT
Start: 2023-10-03 | End: 2023-10-04 | Stop reason: HOSPADM

## 2023-10-03 RX ORDER — PROCHLORPERAZINE EDISYLATE 5 MG/ML
5 INJECTION INTRAMUSCULAR; INTRAVENOUS EVERY 6 HOURS PRN
Status: DISCONTINUED | OUTPATIENT
Start: 2023-10-03 | End: 2023-10-04 | Stop reason: HOSPADM

## 2023-10-03 RX ORDER — OXYTOCIN/RINGER'S LACTATE 30/500 ML
95 PLASTIC BAG, INJECTION (ML) INTRAVENOUS ONCE
Status: DISCONTINUED | OUTPATIENT
Start: 2023-10-03 | End: 2023-10-03 | Stop reason: SDUPTHER

## 2023-10-03 RX ORDER — METHYLERGONOVINE MALEATE 0.2 MG/ML
200 INJECTION INTRAVENOUS ONCE AS NEEDED
Status: DISCONTINUED | OUTPATIENT
Start: 2023-10-03 | End: 2023-10-04 | Stop reason: HOSPADM

## 2023-10-03 RX ORDER — ONDANSETRON 8 MG/1
8 TABLET, ORALLY DISINTEGRATING ORAL EVERY 8 HOURS PRN
Status: DISCONTINUED | OUTPATIENT
Start: 2023-10-03 | End: 2023-10-03

## 2023-10-03 RX ORDER — DOCUSATE SODIUM 100 MG/1
200 CAPSULE, LIQUID FILLED ORAL 2 TIMES DAILY PRN
Status: DISCONTINUED | OUTPATIENT
Start: 2023-10-03 | End: 2023-10-04 | Stop reason: HOSPADM

## 2023-10-03 RX ORDER — CARBOPROST TROMETHAMINE 250 UG/ML
250 INJECTION, SOLUTION INTRAMUSCULAR
Status: DISCONTINUED | OUTPATIENT
Start: 2023-10-03 | End: 2023-10-04 | Stop reason: HOSPADM

## 2023-10-03 RX ORDER — DIPHENHYDRAMINE HYDROCHLORIDE 50 MG/ML
25 INJECTION INTRAMUSCULAR; INTRAVENOUS EVERY 4 HOURS PRN
Status: DISCONTINUED | OUTPATIENT
Start: 2023-10-03 | End: 2023-10-04 | Stop reason: HOSPADM

## 2023-10-03 RX ORDER — SODIUM CHLORIDE, SODIUM LACTATE, POTASSIUM CHLORIDE, CALCIUM CHLORIDE 600; 310; 30; 20 MG/100ML; MG/100ML; MG/100ML; MG/100ML
INJECTION, SOLUTION INTRAVENOUS CONTINUOUS
Status: DISCONTINUED | OUTPATIENT
Start: 2023-10-03 | End: 2023-10-03

## 2023-10-03 RX ORDER — OXYTOCIN/RINGER'S LACTATE 30/500 ML
334 PLASTIC BAG, INJECTION (ML) INTRAVENOUS ONCE
Status: DISCONTINUED | OUTPATIENT
Start: 2023-10-03 | End: 2023-10-03

## 2023-10-03 RX ORDER — OXYTOCIN/RINGER'S LACTATE 30/500 ML
334 PLASTIC BAG, INJECTION (ML) INTRAVENOUS ONCE AS NEEDED
Status: DISCONTINUED | OUTPATIENT
Start: 2023-10-03 | End: 2023-10-04 | Stop reason: HOSPADM

## 2023-10-03 RX ORDER — ACETAMINOPHEN 325 MG/1
650 TABLET ORAL EVERY 6 HOURS PRN
Status: DISCONTINUED | OUTPATIENT
Start: 2023-10-03 | End: 2023-10-04 | Stop reason: HOSPADM

## 2023-10-03 RX ORDER — IBUPROFEN 600 MG/1
600 TABLET ORAL EVERY 6 HOURS PRN
Status: DISCONTINUED | OUTPATIENT
Start: 2023-10-03 | End: 2023-10-04 | Stop reason: HOSPADM

## 2023-10-03 RX ORDER — BUTORPHANOL TARTRATE 1 MG/ML
1 INJECTION INTRAMUSCULAR; INTRAVENOUS
Status: DISCONTINUED | OUTPATIENT
Start: 2023-10-03 | End: 2023-10-03

## 2023-10-03 RX ORDER — HYDROCODONE BITARTRATE AND ACETAMINOPHEN 5; 325 MG/1; MG/1
1 TABLET ORAL EVERY 4 HOURS PRN
Status: DISCONTINUED | OUTPATIENT
Start: 2023-10-03 | End: 2023-10-04 | Stop reason: HOSPADM

## 2023-10-03 RX ORDER — PRENATAL WITH FERROUS FUM AND FOLIC ACID 3080; 920; 120; 400; 22; 1.84; 3; 20; 10; 1; 12; 200; 27; 25; 2 [IU]/1; [IU]/1; MG/1; [IU]/1; MG/1; MG/1; MG/1; MG/1; MG/1; MG/1; UG/1; MG/1; MG/1; MG/1; MG/1
1 TABLET ORAL DAILY
Status: DISCONTINUED | OUTPATIENT
Start: 2023-10-04 | End: 2023-10-04 | Stop reason: HOSPADM

## 2023-10-03 RX ORDER — OXYTOCIN/RINGER'S LACTATE 30/500 ML
0-30 PLASTIC BAG, INJECTION (ML) INTRAVENOUS CONTINUOUS
Status: DISCONTINUED | OUTPATIENT
Start: 2023-10-03 | End: 2023-10-03

## 2023-10-03 RX ORDER — PROCHLORPERAZINE EDISYLATE 5 MG/ML
5 INJECTION INTRAMUSCULAR; INTRAVENOUS EVERY 6 HOURS PRN
Status: DISCONTINUED | OUTPATIENT
Start: 2023-10-03 | End: 2023-10-03

## 2023-10-03 RX ORDER — OXYTOCIN 10 [USP'U]/ML
10 INJECTION, SOLUTION INTRAMUSCULAR; INTRAVENOUS ONCE AS NEEDED
Status: DISCONTINUED | OUTPATIENT
Start: 2023-10-03 | End: 2023-10-04 | Stop reason: HOSPADM

## 2023-10-03 RX ORDER — ROPIVACAINE HYDROCHLORIDE 5 MG/ML
INJECTION, SOLUTION EPIDURAL; INFILTRATION; PERINEURAL
Status: DISCONTINUED | OUTPATIENT
Start: 2023-10-03 | End: 2023-10-03

## 2023-10-03 RX ORDER — DIPHENOXYLATE HYDROCHLORIDE AND ATROPINE SULFATE 2.5; .025 MG/1; MG/1
2 TABLET ORAL EVERY 6 HOURS PRN
Status: DISCONTINUED | OUTPATIENT
Start: 2023-10-03 | End: 2023-10-04 | Stop reason: HOSPADM

## 2023-10-03 RX ORDER — SODIUM CHLORIDE 0.9 % (FLUSH) 0.9 %
10 SYRINGE (ML) INJECTION
Status: DISCONTINUED | OUTPATIENT
Start: 2023-10-03 | End: 2023-10-04 | Stop reason: HOSPADM

## 2023-10-03 RX ORDER — ROPIVACAINE HYDROCHLORIDE 2 MG/ML
INJECTION, SOLUTION EPIDURAL; INFILTRATION CONTINUOUS PRN
Status: DISCONTINUED | OUTPATIENT
Start: 2023-10-03 | End: 2023-10-03

## 2023-10-03 RX ORDER — OXYTOCIN/RINGER'S LACTATE 30/500 ML
95 PLASTIC BAG, INJECTION (ML) INTRAVENOUS ONCE
Status: DISCONTINUED | OUTPATIENT
Start: 2023-10-03 | End: 2023-10-03

## 2023-10-03 RX ORDER — FENTANYL CITRATE 50 UG/ML
INJECTION, SOLUTION INTRAMUSCULAR; INTRAVENOUS
Status: DISCONTINUED | OUTPATIENT
Start: 2023-10-03 | End: 2023-10-03

## 2023-10-03 RX ORDER — DIPHENHYDRAMINE HCL 25 MG
25 CAPSULE ORAL EVERY 4 HOURS PRN
Status: DISCONTINUED | OUTPATIENT
Start: 2023-10-03 | End: 2023-10-04 | Stop reason: HOSPADM

## 2023-10-03 RX ADMIN — DOCUSATE SODIUM 200 MG: 100 CAPSULE, LIQUID FILLED ORAL at 09:10

## 2023-10-03 RX ADMIN — Medication 2 MILLI-UNITS/MIN: at 05:10

## 2023-10-03 RX ADMIN — ROPIVACAINE HYDROCHLORIDE 3 ML: 5 INJECTION, SOLUTION EPIDURAL; INFILTRATION; PERINEURAL at 10:10

## 2023-10-03 RX ADMIN — ONDANSETRON 8 MG: 8 TABLET, ORALLY DISINTEGRATING ORAL at 04:10

## 2023-10-03 RX ADMIN — ROPIVACAINE HYDROCHLORIDE 12 ML/HR: 2 INJECTION, SOLUTION EPIDURAL; INFILTRATION at 10:10

## 2023-10-03 RX ADMIN — FENTANYL CITRATE 100 MCG: 50 INJECTION, SOLUTION INTRAMUSCULAR; INTRAVENOUS at 10:10

## 2023-10-03 RX ADMIN — IBUPROFEN 600 MG: 600 TABLET ORAL at 09:10

## 2023-10-03 RX ADMIN — SODIUM CHLORIDE, POTASSIUM CHLORIDE, SODIUM LACTATE AND CALCIUM CHLORIDE: 600; 310; 30; 20 INJECTION, SOLUTION INTRAVENOUS at 05:10

## 2023-10-03 NOTE — H&P
[facility] - Labor & Delivery  Obstetrics  History & Physical    Patient Name: Cortez Radford  MRN: [id number]  Admission Date: 10/3/2023  Primary Care Provider: Gabrielle, Start    Subjective:     Principal Problem:<principal problem not specified>    History of Present Illness:  Patient admitted for elective IOL. No complaints. Good FM.       Obstetric HPI:  Patient reports None contractions, active fetal movement, No vaginal bleeding , No loss of fluid     This pregnancy has been complicated by history of PPH    OB History    Para Term  AB Living   3 2 2 0 0 2   SAB IAB Ectopic Multiple Live Births   0 0 0 0 2      # Outcome Date GA Lbr Brenden/2nd Weight Sex Delivery Anes PTL Lv   3 Current            2 Term 2020   3.175 kg (7 lb) M Vag-Spont   LIZETH   1 Term 2009   2.892 kg (6 lb 6 oz) F Vag-Spont   LIZETH     Past Medical History:   Diagnosis Date    Allergy     Anxiety disorder, unspecified     Depression     GERD (gastroesophageal reflux disease)     Thyroid dysfunction      Past Surgical History:   Procedure Laterality Date    ADENOIDECTOMY      ARTHROSCOPIC CHONDROPLASTY OF KNEE JOINT Right 10/24/2022    Procedure: ARTHROSCOPY, KNEE, WITH CHONDROPLASTY;  Surgeon: Jamel Pierson MD;  Location: [facility] OR;  Service: Orthopedics;  Laterality: Right;    CHOLECYSTECTOMY      KNEE ARTHROSCOPY W/ MENISCECTOMY Right 10/24/2022    Procedure: ARTHROSCOPY, KNEE, WITH MENISCECTOMY;  Surgeon: Jamel Pierson MD;  Location: [facility] OR;  Service: Orthopedics;  Laterality: Right;  LINVATEC CONFIRMED 10/21/DME    REPAIR OF MENISCUS OF KNEE  10/24/2022    Procedure: REPAIR, MENISCUS, KNEE;  Surgeon: Jamel Pierson MD;  Location: [facility] OR;  Service: Orthopedics;;    Right knee medial meniscus repair  10/24/2022    TONSILLECTOMY         Facility-Administered Medications Prior to Admission   Medication    calcium carbonate 200 mg calcium (500 mg) chewable tablet 500 mg    carboprost injection 250 mcg     diphenoxylate-atropine 2.5-0.025 mg per tablet 2 tablet    lactated ringers bolus 1,000 mL    lactated ringers bolus 500 mL    lactated ringers infusion    lactated ringers infusion    LIDOcaine HCL 10 mg/ml (1%) injection 10 mL    methylergonovine injection 200 mcg    miSOPROStoL tablet 800 mcg    miSOPROStoL tablet 800 mcg    ondansetron disintegrating tablet 8 mg    oxytocin 30 units in 500 mL lactated ringers infusion (non-titrating)    oxytocin 30 units in 500 mL lactated ringers infusion (non-titrating)    oxytocin 30 units in 500 mL lactated ringers infusion (non-titrating)    oxytocin 30 units in 500 mL lactated ringers infusion (titrating)    oxytocin 30 units in 500 mL lactated ringers infusion (titrating)    oxytocin injection 10 Units    prochlorperazine injection Soln 5 mg    simethicone chewable tablet 80 mg    tranexamic acid (CYKLOKAPRON) 1,000 mg in sodium chloride 0.9% 100 mL     PTA Medications   Medication Sig    prenatal 105-iron-folic ac-dha 30 mg iron- 1.4 mg-300 mg Cmpk Take by mouth.    atorvastatin (LIPITOR) 20 MG tablet 1 tablet Orally Once a day for 30 day(s)    butalbitaL-acetaminophen  mg Tab 1 tablet as needed Orally every 8 hrs for 7 days    ezetimibe (ZETIA) 10 mg tablet 1 tablet Orally Once a day for 30 day(s)    lamoTRIgine (LAMICTAL) 25 MG tablet 1 tablet Orally Once a day for 30 day(s)    loperamide (IMODIUM) 2 mg capsule     metoclopramide HCl (REGLAN) 5 MG tablet Take 5 mg by mouth 4 (four) times daily.    pantoprazole (PROTONIX) 40 MG tablet Take 1 tablet (40 mg total) by mouth once daily.       Review of patient's allergies indicates:  No Known Allergies     Family History       Problem Relation (Age of Onset)    Cancer Mother    Cervical cancer Maternal Aunt    Diabetes Father    Hypertension Father    Ovarian cancer Maternal Grandmother, Mother          Tobacco Use    Smoking status: Every Day     Current packs/day: 1.00     Average  packs/day: 1 pack/day for 9.3 years (9.3 ttl pk-yrs)     Types: Cigarettes     Start date: 6/8/2014    Smokeless tobacco: Never   Substance and Sexual Activity    Alcohol use: Not Currently     Comment: once every few months    Drug use: Yes     Frequency: 1.0 times per week     Types: Marijuana     Comment: once a week    Sexual activity: Yes     Partners: Male     Comment: has 20 month old baby     Review of Systems   Constitutional:  Negative for activity change, appetite change, chills, diaphoresis, fatigue, fever and unexpected weight change.   HENT:  Negative for mouth sores and tinnitus.    Eyes:  Negative for discharge and visual disturbance.   Respiratory:  Negative for cough, shortness of breath and wheezing.    Cardiovascular:  Negative for chest pain, palpitations and leg swelling.   Gastrointestinal:  Negative for abdominal pain, bloating, blood in stool, constipation, diarrhea, nausea and vomiting.   Endocrine: Negative for diabetes, hair loss, hot flashes, hyperthyroidism and hypothyroidism.   Genitourinary:  Negative for dysuria, flank pain, frequency, genital sores, hematuria, urgency, vaginal bleeding, vaginal discharge, vaginal pain, postcoital bleeding and vaginal odor.   Musculoskeletal:  Negative for back pain, joint swelling and myalgias.   Integumentary:  Negative for rash, acne, breast mass, nipple discharge and breast skin changes.   Neurological:  Negative for seizures, syncope, numbness and headaches.   Hematological:  Negative for adenopathy. Does not bruise/bleed easily.   Psychiatric/Behavioral:  Negative for depression and sleep disturbance. The patient is not nervous/anxious.    Breast: Negative for mass, mastodynia, nipple discharge and skin changes     Objective:     Vital Signs (Most Recent):  Temp: 96.8 °F (36 °C) (10/03/23 0703)  Pulse: (!) 58 (10/03/23 0849)  Resp: 18 (10/03/23 0703)  BP: 113/61 (10/03/23 0849)  SpO2: 97 % (10/03/23 0816) Vital Signs (24h Range):  Temp:   [96.8 °F (36 °C)-97.2 °F (36.2 °C)] 96.8 °F (36 °C)  Pulse:  [58-88] 58  Resp:  [18] 18  SpO2:  [95 %-99 %] 97 %  BP: (100-113)/(48-67) 113/61     Weight: 101.6 kg (224 lb)  Body mass index is 40.97 kg/m².    FHT: 130 Cat 1 (reassuring)  TOCO: absent     Physical Exam:   Constitutional: She is oriented to person, place, and time. She appears well-developed and well-nourished.    HENT:   Head: Normocephalic and atraumatic.    Eyes: Pupils are equal, round, and reactive to light. EOM are normal.     Cardiovascular:  Normal rate and regular rhythm.             Pulmonary/Chest: Effort normal and breath sounds normal.        Abdominal: Soft. Bowel sounds are normal.   Gravid     Genitourinary:    Vagina and uterus normal.             Musculoskeletal: Normal range of motion and moves all extremeties.       Neurological: She is alert and oriented to person, place, and time.    Skin: Skin is warm and dry.    Psychiatric: She has a normal mood and affect.        Cervix:  Dilation:  2  Effacement:  60%  Station: -3  Presentation: Vertex     Significant Labs:  Lab Results   Component Value Date    GROUPTRH A NEG 10/03/2023    STREPBCULT No Group B Streptococcus isolated 09/15/2023       I have personallly reviewed all pertinent lab results from the last 24 hours.    Assessment/Plan:     32 y.o. female  at 39w3d for:    Encounter for elective induction of labor  Pitocin IOL    GBS negative    Consents in chart      Patient cleared for Anesthesia:  Epidural    Anesthesia/Surgery risks, benefits, and alternative options discussed and understood by patient/family.          Jada Jackson MD  Obstetrics  [facility] - Labor & Delivery

## 2023-10-03 NOTE — ANESTHESIA PROCEDURE NOTES
Epidural    Patient location during procedure: OB   Reason for block: primary anesthetic   Reason for block: labor analgesia requested by patient and obstetrician  Diagnosis: labor pain   Start time: 10/3/2023 10:11 AM  Timeout: 10/3/2023 10:10 AM  End time: 10/3/2023 10:15 AM  Surgery related to: pregnacy    Staffing  Performing Provider: Victoriano Wilkins CRNA  Authorizing Provider: Victoriano Wilkins CRNA    Staffing  Performed by: Victoriano Wilkins CRNA  Authorized by: Victoriano Wilkins CRNA        Preanesthetic Checklist  Completed: patient identified, IV checked, site marked, risks and benefits discussed, surgical consent, monitors and equipment checked, pre-op evaluation, timeout performed, anesthesia consent given, hand hygiene performed and patient being monitored  Preparation  Patient position: sitting  Patient monitoring: ECG, Pulse Ox, continuous capnometry and Blood Pressure  Reason for block: primary anesthetic   Epidural  Skin Anesthetic: lidocaine 1%  Skin Wheal: 5 mL  Administration type: continuous  Approach: midline  Interspace: L3-4    Injection technique: SAMIA saline  Needle and Epidural Catheter  Needle type: Tuohy   Needle gauge: 18  Needle length: 5.0 inches  Catheter type: multi-orifice  Catheter size: 20 G  Insertion Attempts: 1  Test dose: 3 mL of lidocaine 1.5% with Epi 1-to-200,000  Additional Documentation: incremental injection, negative aspiration for heme and CSF, no paresthesia on injection, no signs/symptoms of IV or SA injection, no significant pain on injection and no significant complaints from patient  Needle localization: anatomical landmarks  Assessment  Upper dermatomal levels - Left: T6  Right: T6   Dermatomal levels determined by alcohol wipe  Ease of block: easy  Patient's tolerance of the procedure: comfortable throughout block and no complaints No inadvertent dural puncture with Tuohy.  Dural puncture performed with spinal needle.

## 2023-10-03 NOTE — PLAN OF CARE
[facility] - Labor & Delivery  OB Initial Discharge Assessment       Primary Care Provider: Gabrielle, Start    Expected Discharge Date:     Initial Assessment (most recent)       OB Discharge Planning Assessment - 10/03/23 1330          OB Discharge Planning Assessment    Assessment Type Discharge Planning Assessment     Source of Information patient;family;health record     Verified Demographic and Insurance Information Yes     Insurance Medicaid     Medicaid [facility]     Medicaid Insurance Primary     People in Home child(david), dependent;spouse;parent(s)     Name(s) of People in Home Patient, her , younger son, and husbands parents.     Number people in home 6     Relationship Status      Name of Support/Comfort Primary Source Carloz Radford -      Other children (include names and ages) 3 year old son - Carloz     Employed No     Currently Enrolled in School No     Father's Involvement Fully Involved     Is Father signing the birth certificate Yes     Father's Address same     Father's Employer [employer]     Father's Job Title      Primary Contact Name and Number Carloz Radford - [phone]     Received Prenatal Care Yes     [place] Already [place], will apply for new born      Arrangements Self     Adoption Planned no     Infant Feeding Plan breastfeeding     Does baby have crib or safe sleep space? Yes     Do you have a car seat? Yes     Has other essential care items? Clothing;Bottles;Diapers     Equipment Currently Used at Home none     Potential Discharge Needs None     DME Needed Upon Discharge  none     DCFS --   Attempted notification - would not take report until baby is born.    Discharge Plan A Home with family     Do you have any problems affording any of your prescribed medications? TBD                    met with the patient, her  and her mother in law. Patient is already connected to WIC benefits -  knows she need to apply for the baby after birth. Reports that family and friends helped them get all needed supplies for the baby - father would like additional diapers and wipes as available by the hospital. Father is also requesting a work excuse for period of time starting yesterday. Plans to return to work on Thursday.     Patient and her family live with the husbands parents and long term  plan is the paternal grandfather - although mom is currently staying at home and will be initial caretaker.     Discussed marijuana use during pregnancy and mom emailed  a copy of her medical marijuana card. Encouraged abstinence during breast feeding. Did explain that report would be called in per state law; however, the copy of the prescription for medical marijuana would be provided as well.     Very brief discussion on post partum depression. Mom reports experiencing it some after last child birth - offered her to reach out anytime in the future if she feels like she needs help/resources.     DCFS Worker was present in L&D unit - questioned about medical marijuana card and need to report.  was encouraged to call it in and let the state workers decide what is necessary.     Case management to remain available for any future needs.        Healthcare Directives:   Advance Directive  (If Adv Dir status is received, view document under Adv Dir in header or Chart Review Media tab): Patient does not have Advance Directive, declines information.

## 2023-10-03 NOTE — PLAN OF CARE
attempted to contact DCFS regarding positive drug screen during pregnancy/her medical marijuana card status - because the baby has not been born/taken its first breath, a report can not be taken. Must call back after birth.

## 2023-10-03 NOTE — ASSESSMENT & PLAN NOTE
Pitocin IOL    GBS negative    Consents in chart      Patient cleared for Anesthesia:  Epidural    Anesthesia/Surgery risks, benefits, and alternative options discussed and understood by patient/family.

## 2023-10-03 NOTE — PLAN OF CARE
Problem: Adult Inpatient Plan of Care  Goal: Plan of Care Review  10/3/2023 1746 by Iva Pete RN  Outcome: Ongoing, Progressing  10/3/2023 0918 by Iva Pete RN  Outcome: Ongoing, Progressing  Goal: Patient-Specific Goal (Individualized)  10/3/2023 1746 by Iva Pete RN  Outcome: Ongoing, Progressing  10/3/2023 0918 by Iva Pete RN  Outcome: Ongoing, Progressing  Goal: Absence of Hospital-Acquired Illness or Injury  10/3/2023 1746 by Iva Pete RN  Outcome: Ongoing, Progressing  10/3/2023 0918 by Iva Pete RN  Outcome: Ongoing, Progressing  Goal: Optimal Comfort and Wellbeing  10/3/2023 1746 by Iva Pete RN  Outcome: Ongoing, Progressing  10/3/2023 0918 by Iva Pete RN  Outcome: Ongoing, Progressing  Goal: Readiness for Transition of Care  10/3/2023 1746 by Iva Pete RN  Outcome: Ongoing, Progressing  10/3/2023 0918 by Iva Pete RN  Outcome: Ongoing, Progressing     Problem: Bariatric Environmental Safety  Goal: Safety Maintained with Care  10/3/2023 1746 by Iva Pete RN  Outcome: Ongoing, Progressing  10/3/2023 0918 by Iva Pete RN  Outcome: Ongoing, Progressing     Problem:  Fall Injury Risk  Goal: Absence of Fall, Infant Drop and Related Injury  10/3/2023 1746 by Iva Pete RN  Outcome: Ongoing, Progressing  10/3/2023 0918 by Iva Pete RN  Outcome: Ongoing, Progressing     Problem: Adjustment to Role Transition (Postpartum Vaginal Delivery)  Goal: Successful Maternal Role Transition  Outcome: Ongoing, Progressing     Problem: Bleeding (Postpartum Vaginal Delivery)  Goal: Hemostasis  Outcome: Ongoing, Progressing     Problem: Infection (Postpartum Vaginal Delivery)  Goal: Absence of Infection Signs/Symptoms  Outcome: Ongoing, Progressing     Problem: Pain (Postpartum Vaginal Delivery)  Goal: Acceptable Pain Control  Outcome: Ongoing, Progressing     Problem: Urinary Retention (Postpartum Vaginal  Delivery)  Goal: Effective Urinary Elimination  Outcome: Ongoing, Progressing     Problem: Breastfeeding  Goal: Effective Breastfeeding  Outcome: Ongoing, Progressing

## 2023-10-03 NOTE — SUBJECTIVE & OBJECTIVE
Obstetric HPI:  Patient reports None contractions, active fetal movement, No vaginal bleeding , No loss of fluid     This pregnancy has been complicated by history of PPH    OB History    Para Term  AB Living   3 2 2 0 0 2   SAB IAB Ectopic Multiple Live Births   0 0 0 0 2      # Outcome Date GA Lbr Brenden/2nd Weight Sex Delivery Anes PTL Lv   3 Current            2 Term 2020   3.175 kg (7 lb) M Vag-Spont   LIZETH   1 Term 2009   2.892 kg (6 lb 6 oz) F Vag-Spont   LIZETH     Past Medical History:   Diagnosis Date    Allergy     Anxiety disorder, unspecified     Depression     GERD (gastroesophageal reflux disease)     Thyroid dysfunction      Past Surgical History:   Procedure Laterality Date    ADENOIDECTOMY      ARTHROSCOPIC CHONDROPLASTY OF KNEE JOINT Right 10/24/2022    Procedure: ARTHROSCOPY, KNEE, WITH CHONDROPLASTY;  Surgeon: Jamel Pierson MD;  Location: [facility] OR;  Service: Orthopedics;  Laterality: Right;    CHOLECYSTECTOMY      KNEE ARTHROSCOPY W/ MENISCECTOMY Right 10/24/2022    Procedure: ARTHROSCOPY, KNEE, WITH MENISCECTOMY;  Surgeon: Jamel Pierson MD;  Location: [facility] OR;  Service: Orthopedics;  Laterality: Right;  LINVATEC CONFIRMED 10/21/DME    REPAIR OF MENISCUS OF KNEE  10/24/2022    Procedure: REPAIR, MENISCUS, KNEE;  Surgeon: Jamel Pierson MD;  Location: [facility] OR;  Service: Orthopedics;;    Right knee medial meniscus repair  10/24/2022    TONSILLECTOMY         Facility-Administered Medications Prior to Admission   Medication    calcium carbonate 200 mg calcium (500 mg) chewable tablet 500 mg    carboprost injection 250 mcg    diphenoxylate-atropine 2.5-0.025 mg per tablet 2 tablet    lactated ringers bolus 1,000 mL    lactated ringers bolus 500 mL    lactated ringers infusion    lactated ringers infusion    LIDOcaine HCL 10 mg/ml (1%) injection 10 mL    methylergonovine injection 200 mcg    miSOPROStoL tablet 800 mcg    miSOPROStoL tablet 800 mcg    ondansetron  disintegrating tablet 8 mg    oxytocin 30 units in 500 mL lactated ringers infusion (non-titrating)    oxytocin 30 units in 500 mL lactated ringers infusion (non-titrating)    oxytocin 30 units in 500 mL lactated ringers infusion (non-titrating)    oxytocin 30 units in 500 mL lactated ringers infusion (titrating)    oxytocin 30 units in 500 mL lactated ringers infusion (titrating)    oxytocin injection 10 Units    prochlorperazine injection Soln 5 mg    simethicone chewable tablet 80 mg    tranexamic acid (CYKLOKAPRON) 1,000 mg in sodium chloride 0.9% 100 mL     PTA Medications   Medication Sig    prenatal 105-iron-folic ac-dha 30 mg iron- 1.4 mg-300 mg Cmpk Take by mouth.    atorvastatin (LIPITOR) 20 MG tablet 1 tablet Orally Once a day for 30 day(s)    butalbitaL-acetaminophen  mg Tab 1 tablet as needed Orally every 8 hrs for 7 days    ezetimibe (ZETIA) 10 mg tablet 1 tablet Orally Once a day for 30 day(s)    lamoTRIgine (LAMICTAL) 25 MG tablet 1 tablet Orally Once a day for 30 day(s)    loperamide (IMODIUM) 2 mg capsule     metoclopramide HCl (REGLAN) 5 MG tablet Take 5 mg by mouth 4 (four) times daily.    pantoprazole (PROTONIX) 40 MG tablet Take 1 tablet (40 mg total) by mouth once daily.       Review of patient's allergies indicates:  No Known Allergies     Family History       Problem Relation (Age of Onset)    Cancer Mother    Cervical cancer Maternal Aunt    Diabetes Father    Hypertension Father    Ovarian cancer Maternal Grandmother, Mother          Tobacco Use    Smoking status: Every Day     Current packs/day: 1.00     Average packs/day: 1 pack/day for 9.3 years (9.3 ttl pk-yrs)     Types: Cigarettes     Start date: 6/8/2014    Smokeless tobacco: Never   Substance and Sexual Activity    Alcohol use: Not Currently     Comment: once every few months    Drug use: Yes     Frequency: 1.0 times per week     Types: Marijuana     Comment: once a week    Sexual activity: Yes     Partners: Male     Comment:  has 20 month old baby     Review of Systems   Constitutional:  Negative for activity change, appetite change, chills, diaphoresis, fatigue, fever and unexpected weight change.   HENT:  Negative for mouth sores and tinnitus.    Eyes:  Negative for discharge and visual disturbance.   Respiratory:  Negative for cough, shortness of breath and wheezing.    Cardiovascular:  Negative for chest pain, palpitations and leg swelling.   Gastrointestinal:  Negative for abdominal pain, bloating, blood in stool, constipation, diarrhea, nausea and vomiting.   Endocrine: Negative for diabetes, hair loss, hot flashes, hyperthyroidism and hypothyroidism.   Genitourinary:  Negative for dysuria, flank pain, frequency, genital sores, hematuria, urgency, vaginal bleeding, vaginal discharge, vaginal pain, postcoital bleeding and vaginal odor.   Musculoskeletal:  Negative for back pain, joint swelling and myalgias.   Integumentary:  Negative for rash, acne, breast mass, nipple discharge and breast skin changes.   Neurological:  Negative for seizures, syncope, numbness and headaches.   Hematological:  Negative for adenopathy. Does not bruise/bleed easily.   Psychiatric/Behavioral:  Negative for depression and sleep disturbance. The patient is not nervous/anxious.    Breast: Negative for mass, mastodynia, nipple discharge and skin changes     Objective:     Vital Signs (Most Recent):  Temp: 96.8 °F (36 °C) (10/03/23 0703)  Pulse: (!) 58 (10/03/23 0849)  Resp: 18 (10/03/23 0703)  BP: 113/61 (10/03/23 0849)  SpO2: 97 % (10/03/23 0816) Vital Signs (24h Range):  Temp:  [96.8 °F (36 °C)-97.2 °F (36.2 °C)] 96.8 °F (36 °C)  Pulse:  [58-88] 58  Resp:  [18] 18  SpO2:  [95 %-99 %] 97 %  BP: (100-113)/(48-67) 113/61     Weight: 101.6 kg (224 lb)  Body mass index is 40.97 kg/m².    FHT: 130 Cat 1 (reassuring)  TOCO: absent     Physical Exam:   Constitutional: She is oriented to person, place, and time. She appears well-developed and well-nourished.     HENT:   Head: Normocephalic and atraumatic.    Eyes: Pupils are equal, round, and reactive to light. EOM are normal.     Cardiovascular:  Normal rate and regular rhythm.             Pulmonary/Chest: Effort normal and breath sounds normal.        Abdominal: Soft. Bowel sounds are normal.   Gravid     Genitourinary:    Vagina and uterus normal.             Musculoskeletal: Normal range of motion and moves all extremeties.       Neurological: She is alert and oriented to person, place, and time.    Skin: Skin is warm and dry.    Psychiatric: She has a normal mood and affect.        Cervix:  Dilation:  2  Effacement:  60%  Station: -3  Presentation: Vertex     Significant Labs:  Lab Results   Component Value Date    GROUPTRH A NEG 10/03/2023    STREPBCULT No Group B Streptococcus isolated 09/15/2023       I have personallly reviewed all pertinent lab results from the last 24 hours.

## 2023-10-03 NOTE — ANESTHESIA POSTPROCEDURE EVALUATION
Anesthesia Post Evaluation    Patient: Cortez Radford    Procedure(s) Performed: * No procedures listed *    Final Anesthesia Type: epidural      Patient location during evaluation: labor & delivery  Patient participation: Yes- Able to Participate  Level of consciousness: awake and alert, oriented and awake  Post-procedure vital signs: reviewed and stable  Pain management: adequate  Airway patency: patent    PONV status at discharge: No PONV  Anesthetic complications: no      Cardiovascular status: blood pressure returned to baseline, hemodynamically stable and stable  Respiratory status: unassisted, spontaneous ventilation and room air  Hydration status: euvolemic  Follow-up not needed.          Vitals Value Taken Time   BP 89/53 10/03/23 1724   Temp 36.2 °C (97.2 °F) 10/03/23 1544   Pulse 64 10/03/23 1724   Resp 18 10/03/23 0703   SpO2 97 % 10/03/23 1718   Vitals shown include unvalidated device data.      No case tracking events are documented in the log.      Pain/Tashia Score: No data recorded

## 2023-10-03 NOTE — NURSING
Behavioral Health Risks Screening Tool completed with patient during triage. Patient screened positive for tobacco use.    At this time a brief intervention/brief treatment with the patient was provided.  I stated my medical concern to the patient regarding tobacco use.  Patient educated on increase risk of postpartum depression (PPD) due to having a history of depression or experiencing depression at this time. Patient instructed that at any point in the postpartum period she feels depressed to contact her MD immediately due to the serious nature of PPD.< Patient verbalizes understanding. Patient needs further instruction.>  I advised the patient to abstain and/or reduce use of  tobacco.  I checked the patient's reaction through observation of response during our discussion. The patient was receptive of information provided and discussed with patient.   I referred the patient for further assessment via a social work consult due to this positive screening.   I provided the patient with written information/resources on Tobacco use in Pregnancy and Mental Health & Substance Use Disorder Resource Guide

## 2023-10-03 NOTE — ANESTHESIA PREPROCEDURE EVALUATION
10/03/2023  Cortez Radford is a 32 y.o., female.      Pre-op Assessment    I have reviewed the Patient Summary Reports.     I have reviewed the Nursing Notes. I have reviewed the NPO Status.   I have reviewed the Medications.     Review of Systems  Anesthesia Hx:  No problems with previous Anesthesia    Social:  No Alcohol Use, Former Smoker    Hematology/Oncology:  Hematology Normal   Oncology Normal     EENT/Dental:EENT/Dental Normal   Cardiovascular:  Cardiovascular Normal Exercise tolerance: good     Pulmonary:  Pulmonary Normal    Renal/:  Renal/ Normal     Hepatic/GI:   GERD    Musculoskeletal:  Musculoskeletal Normal    Neurological:  Neurology Normal    Endocrine:  Endocrine Normal    Dermatological:  Skin Normal    Psych:   Psychiatric History          Physical Exam  General: Well nourished    Airway:  Mallampati: II   Mouth Opening: Normal  TM Distance: Normal  Tongue: Normal  Neck ROM: Normal ROM    Chest/Lungs:  Clear to auscultation    Heart:  Rate: Normal  Rhythm: Regular Rhythm  Sounds: Normal        Anesthesia Plan  Type of Anesthesia, risks & benefits discussed:    Anesthesia Type: Epidural  Intra-op Monitoring Plan: Standard ASA Monitors  Post Op Pain Control Plan: multimodal analgesia and epidural analgesia  Induction:  IV  Airway Plan: Direct  Informed Consent: Informed consent signed with the Patient and all parties understand the risks and agree with anesthesia plan.  All questions answered.   ASA Score: 2  Day of Surgery Review of History & Physical: H&P Update referred to the surgeon/provider.I have interviewed and examined the patient. I have reviewed the patient's H&P dated: 10/3/23. There are no significant changes.     Ready For Surgery From Anesthesia Perspective.     .    Recent Labs   Lab 10/03/23  0503   WBC 8.51   RBC 4.17   HGB 13.0   HCT 38.4   *   MCV 92   MCH  31.2*   MCHC 33.9

## 2023-10-04 VITALS
SYSTOLIC BLOOD PRESSURE: 96 MMHG | DIASTOLIC BLOOD PRESSURE: 54 MMHG | OXYGEN SATURATION: 98 % | TEMPERATURE: 98 F | BODY MASS INDEX: 41.22 KG/M2 | WEIGHT: 224 LBS | HEIGHT: 62 IN | HEART RATE: 60 BPM | RESPIRATION RATE: 20 BRPM

## 2023-10-04 LAB — RPR SER QL: NORMAL

## 2023-10-04 PROCEDURE — 99238 HOSP IP/OBS DSCHRG MGMT 30/<: CPT | Mod: ,,, | Performed by: OBSTETRICS & GYNECOLOGY

## 2023-10-04 PROCEDURE — 99238 PR HOSPITAL DISCHARGE DAY,<30 MIN: ICD-10-PCS | Mod: ,,, | Performed by: OBSTETRICS & GYNECOLOGY

## 2023-10-04 PROCEDURE — 25000003 PHARM REV CODE 250: Performed by: OBSTETRICS & GYNECOLOGY

## 2023-10-04 RX ORDER — IBUPROFEN 600 MG/1
600 TABLET ORAL EVERY 8 HOURS PRN
Qty: 30 TABLET | Refills: 0 | Status: SHIPPED | OUTPATIENT
Start: 2023-10-04

## 2023-10-04 RX ADMIN — HYDROCODONE BITARTRATE AND ACETAMINOPHEN 1 TABLET: 5; 325 TABLET ORAL at 10:10

## 2023-10-04 RX ADMIN — IBUPROFEN 600 MG: 600 TABLET ORAL at 10:10

## 2023-10-04 RX ADMIN — DOCUSATE SODIUM 200 MG: 100 CAPSULE, LIQUID FILLED ORAL at 09:10

## 2023-10-04 RX ADMIN — PRENATAL VIT W/ FE FUMARATE-FA TAB 27-0.8 MG 1 TABLET: 27-0.8 TAB at 09:10

## 2023-10-04 NOTE — PROCEDURES
Procedures     FETAL ASSESSMENT REPORT    RE: Cortez Radford  MRN:  [id number]  :  1990  AGE:  32 y.o.    Date:  10/3/2023    REFERRAL PHYSICIAN: Dr. Jada Jackson    Allergies: Patient has no known allergies.    Cortez is a 32 y.o.  at 39w3d gestational age here today for a NST.    10/7/2023, by Ultrasound    MEDICATIONS AT TIME OF TEST:    Current Facility-Administered Medications   Medication Dose Route Frequency Provider Last Rate Last Admin    acetaminophen tablet 650 mg  650 mg Oral Q6H PRN Jada Jackson MD        benzocaine-lanolin (DERMOPLAST) topical spray   Topical (Top) Continuous PRN Jada Jackson MD        carboprost injection 250 mcg  250 mcg Intramuscular Q15 Min PRN Jada Jackson MD        diphenhydrAMINE capsule 25 mg  25 mg Oral Q4H PRN Jada Jackson MD        diphenhydrAMINE injection 25 mg  25 mg Intravenous Q4H PRN Jada Jackson MD        diphenoxylate-atropine 2.5-0.025 mg per tablet 2 tablet  2 tablet Oral Q6H PRN Jada Jackson MD        docusate sodium capsule 200 mg  200 mg Oral BID PRN Jada Jackson MD   200 mg at 10/04/23 0939    HYDROcodone-acetaminophen  mg per tablet 1 tablet  1 tablet Oral Q4H PRN Jada Jackson MD        HYDROcodone-acetaminophen 5-325 mg per tablet 1 tablet  1 tablet Oral Q4H PRN Jada Jackson MD   1 tablet at 10/04/23 1058    hydrocortisone 2.5 % rectal cream   Rectal TID PRN Jada Jackson MD        ibuprofen tablet 600 mg  600 mg Oral Q6H PRN Jada Jackson MD   600 mg at 10/04/23 1058    lanolin cream   Topical (Top) PRJada Calvo MD        methylergonovine injection 200 mcg  200 mcg Intramuscular Once PRN Jada Jackson MD        miSOPROStoL tablet 800 mcg  800 mcg Rectal Once PRN Jada Jackson MD        miSOPROStoL tablet 800 mcg  800 mcg Oral Once PRN Jada Jackson MD         ondansetron disintegrating tablet 8 mg  8 mg Oral Q8H PRN Jada Jackson MD        oxytocin 30 units in 500 mL lactated ringers infusion (non-titrating)  334 alex-units/min Intravenous Once PRN Jada Jackson MD        oxytocin 30 units in 500 mL lactated ringers infusion (non-titrating)  95 alex-units/min Intravenous Once PRN Jada Jackson MD        oxytocin injection 10 Units  10 Units Intramuscular Once PRN Jada Jackson MD        prenatal vitamin oral tablet  1 tablet Oral Daily Jada Jackson MD   1 tablet at 10/04/23 0937    prochlorperazine injection Soln 5 mg  5 mg Intravenous Q6H PRN Jada Jackson MD        rho(D) immune globulin injection 300 mcg  300 mcg Intramuscular Once Jada Jackson MD        simethicone chewable tablet 80 mg  1 tablet Oral Q6H PRN Jada Jackson MD        sodium chloride 0.9% flush 10 mL  10 mL Intravenous PRN Jada Jackson MD        tranexamic acid in NaCl,iso-os IVPB 1,000 mg  1,000 mg Intravenous Q30 Min PRN Jada Jackson MD           Indication: Labor    Interpretation:  140 BPM baseline    Variability:  Good {> 6 bpm)    Accelerations:  Reactive    Decelerations:  none    Assessment: reactive    Plan:  discussed      Jada Jackson MD  10/4/2023; 2:30 PM

## 2023-10-04 NOTE — PLAN OF CARE
[facility] - Labor & Delivery  Discharge Assessment    Primary Care Provider: Gabrielle, Start     OB Screen (most recent)       OB Screen - 10/04/23 0620          OB SCREEN    Assessment Type Discharge Planning Brief Assessment (P)      Source of Information health record (P)      Received Prenatal Care Yes (P)      Any indications/suspicions for Substance use/disorder (P)      Is this a teen pregnancy No (P)      Is the baby in NICU No (P)      Indication for adoption/Safe Haven No (P)      Indication for DME/post-acute needs No (P)      HIV (+) No (P)      Any congenital  disorders No (P)      Fetal demise/ death No (P)                    This patient has been screened for Case Management needs.  Based on documentation in medical record, it appears there as substance use during pregnancy. Patient transferred care late in pregnancy, however outside records that are scanned into chart does show that on 2023 THC use was an active problem for patient. Patient does have UDS on file in the past (before pregnancy that does show THC use. SW aware and has seen patient. It does not appear that UDS nor meconium has been ordered for baby. If these are ordered and + then report to DCFS should be called in. Case Management/Social Work remains available if a need arises, please enter consult for assistance.  For urgent needs contact Case Management Department/on-call at:  [phone].

## 2023-10-04 NOTE — L&D DELIVERY NOTE
"[place] - Labor & Delivery  Vaginal Delivery   Operative Note    SUMMARY     Normal spontaneous vaginal delivery of live infant, was placed on mothers abdomen for skin to skin and bulb suctioning performed.  Infant delivered position OA over intact perineum.  Nuchal cord: No.    Spontaneous delivery of placenta and IV pitocin given noting good uterine tone.  No lacerations noted.  Patient tolerated delivery well. Sponge needle and lap counted correctly x2.    Indications:  (spontaneous vaginal delivery)  Pregnancy complicated by:   Patient Active Problem List   Diagnosis    History of postpartum hemorrhage    Encounter for elective induction of labor     (spontaneous vaginal delivery)     Admitting GA: 39w3d    Delivery Information for José Radford    Birth information:  YOB: 2022   Time of birth: 4:25 PM   Sex: male   Head Delivery Date/Time: 10/3/2023  4:24 PM   Delivery type: Vaginal, Spontaneous   Gestational Age: 39w3d        Delivery Providers    Delivering clinician: Jada Jackson MD   Provider Role    Iva Pete RN Registered Nurse    Génesis Meza RN Registered Nurse    Tatianna Mauricio RN Registered Nurse    Trey Reaves RN Registered Nurse    Lorin Velazquez,  Surgical Tech    Estela Domingo, RN Registered Nurse              Measurements    Weight: 3175 g  Weight (lbs): 7 lb  Length: 49.5 cm  Length (in): 19.5"  Head circumference: 36.8 cm  Chest circumference: 32.4 cm  Abdominal girth: 31.8 cm         Apgars    Living status: Living  Apgar Component Scores:  1 min.:  5 min.:  10 min.:  15 min.:  20 min.:    Skin color:  1  1       Heart rate:  2  2       Reflex irritability:  2  2       Muscle tone:  2  2       Respiratory effort:  2  2       Total:  9  9       Apgars assigned by: FELISA MAURICIO RN         Operative Delivery    Forceps attempted?: No  Vacuum extractor attempted?: No         Shoulder Dystocia    Shoulder dystocia present?: No   "         Presentation    Presentation: Vertex  Position: Right Occiput Anterior           Interventions/Resuscitation    Method: Bulb Suctioning, Tactile Stimulation       Cord    Vessels: 3 vessels  Complications: None  Delayed Cord Clamping?: Yes  Cord Clamped Date/Time: 10/3/2023  4:27 PM  Cord Blood Disposition: Lab  Gases Sent?: No  Stem Cell Collection (by MD): No       Placenta    Placenta delivery date/time: 10/3/2023 1630  Placenta removal: Spontaneous  Placenta appearance: Intact  Placenta disposition: Discarded           Labor Events:       labor: No     Labor Onset Date/Time:         Dilation Complete Date/Time: 10/03/2023 16:02     Start Pushing Date/Time: 10/03/2023 16:02       Start Pushing Date/Time: 10/03/2023 16:02     Rupture Date/Time: 10/03/23  0858         Rupture type: ARM (Artificial Rupture)         Fluid Amount:       Fluid Color: Clear               steroids: None     Antibiotics given for GBS: No     Induction: oxytocin     Indications for induction:  Elective     Augmentation:       Indications for augmentation:       Labor complications: None     Additional complications:          Cervical ripening:                     Delivery:      Episiotomy: None     Indication for Episiotomy:       Perineal Lacerations: None Repaired:      Periurethral Laceration:   Repaired:     Labial Laceration:   Repaired:     Sulcus Laceration:   Repaired:     Vaginal Laceration:   Repaired:     Cervical Laceration:   Repaired:     Repair suture: None     Repair # of packets:       Last Value - EBL - Nursing (mL):       Sum - EBL - Nursing (mL): 0     Last Value - EBL - Anesthesia (mL):      Calculated QBL (mL): 74      Vaginal Sweep Performed: Yes     Surgicount Correct: Yes     Vaginal Packing: No Quantity:       Other providers:       Anesthesia    Method: Epidural          Details (if applicable):  Trial of Labor      Categorization:      Priority:     Indications  for :     Incision Type:       Additional  information:  Forceps:    Vacuum:    Breech:    Observed anomalies    Other (Comments):

## 2023-10-04 NOTE — LACTATION NOTE
10/04/23 0840   Maternal Assessment   Breast Size Issue none   Breast Shape Bilateral:;round   Breast Density Bilateral:;soft   Areola Bilateral:;elastic   Nipples Bilateral:;everted   Maternal Infant Feeding   Maternal Emotional State relaxed;independent   Infant Positioning clutch/football   Signs of Milk Transfer audible swallow;infant jaw motion present;suck/swallow ratio   Nipple Shape After Feeding, Left   (round, everted)   Latch Assistance yes   Equipment Type   Breast Pump Type double electric, personal  (Mother does not have, reviewed process for ordering through insurance.)   Community Referrals   Community Referrals outpatient lactation program;pediatric care provider;support group;WIC (women, infants and children) program   Outpatient Lactation Program Lactation Follow-up Date/Time as needed / desired   Pediatric Care Provider Lactation Follow-up Date/Time as scheduled / needed   Support Group Lactation Follow-up Date/Time if desired   [facility] Lactation Follow-up Date/Time as needed / scheduled     Mother reports that bf is going well, she is experienced and confident. BF 3 year old x a few months until supply dried up. Mother has had thyroid problems in the past but never diagnosed, reviewed risk and goals. Mother reports that she has been leaking to opposite side when feeding. Mother did use THC as prescribed, reviewed risk of using while bf, encouraged EBF, no THC use while bf, mother v/u. Mother states that she does not plan  on using THC while BF. Mother does have old breast pumps, reviewed ordering through insurance. Infant showing feeding cues, Mother able to position and latch independently. Infant noted with wide gape, flanged lips and strong rhythmic pulls. Mother reports comfort with latch. Basics reviewed during feeding.     Basic Breastfeeding Instructions    The more you nurse the baby the more milk you will make.  Avoid bottles and pacifiers for the first 4 weeks.  Feed your baby only  breastmik for the first 6 months.  Feed your baby at the earliest sign of hunger or comfort:  Sucking on fingers or hands  Bringing hands toward his mouth  Rooting or reaching for something to suck on  Sucking motions with mouth  Fretful noises  Crying is a late sign of hunger or comfort.  The baby should be positioned and latched on to the breast correctly  Chest-to-chest, chin in the breast  Babys lips are flipped outward  Babys mouth is stretched open wide like a shout  Babys sucking should feel like tugging to the mother  - The baby should be drinking at the breast  You should hear an occasional swallow during the feeding  Switch breasts when the baby takes himself off the breast or falls asleep  Keep offering breasts until the baby looks full, no longer gives hunger signs, and stays asleep when placed on his back in the crib  - If the baby is sleepy and wont wake for a feeding, put the baby skin-to-skin dressed in a diaper against the mothers bare chest  - Sleep with your baby near you in the hospital room  - Call the nurse for additional assistance as needed.    Parents questions answered, encouraged to call with any needs, v/u.

## 2023-10-04 NOTE — DISCHARGE SUMMARY
"[place] - Labor & Delivery  Obstetrics  Discharge Summary      Patient Name: Cortez Radford  MRN: [id number]  Admission Date: 10/3/2023  Hospital Length of Stay: 1 days  Discharge Date and Time:  10/04/2023 2:36 PM  Attending Physician: Franki Ferrera, *   Discharging Provider: Franki Ferrera MD   Primary Care Provider: Gabrielle, Start    HPI: Patient admitted for elective IOL. No complaints. Good FM.           * No surgery found *     Hospital Course:   HD#1 Admit for pitocin IOL.   HD#2 Meeting discharge criteria        Consults (From admission, onward)        Status Ordering Provider     Inpatient consult to Lactation  Once        Provider:  (Not yet assigned)    Acknowledged FRANKI FERRERA          Final Active Diagnoses:    Diagnosis Date Noted POA    PRINCIPAL PROBLEM:   (spontaneous vaginal delivery) [O80] 10/03/2023 Not Applicable    Encounter for elective induction of labor [Z34.90] 10/03/2023 Not Applicable      Problems Resolved During this Admission:        Significant Diagnostic Studies: Labs:   CBC   Recent Labs   Lab 10/03/23  0503   WBC 8.51   HGB 13.0   HCT 38.4   *         Feeding Method: bottle    Immunizations     Date Immunization Status Dose Route/Site Given by    10/03/23 1815 Rho (D) Immune Globulin - IM Deferred 300 mcg Intramuscular/ Iva Pete RN          Delivery:    Episiotomy: None   Lacerations: None   Repair suture: None   Repair # of packets:     Blood loss (ml):       Birth information:  YOB: 2022   Time of birth: 4:25 PM   Sex: male   Delivery type: Vaginal, Spontaneous   Gestational Age: 39w3d     Measurements    Weight: 3175 g  Weight (lbs): 7 lb  Length: 49.5 cm  Length (in): 19.5"  Head circumference: 36.8 cm  Chest circumference: 32.4 cm  Abdominal girth: 31.8 cm         Delivery Clinician: Delivery Providers    Delivering clinician: Franki Ferrera MD   Provider Role    Iva Pete RN Registered Nurse    " Génesis Meza, RN Registered Nurse    Tatianna Mauricio RN Registered Nurse    Trey Reaves RN Registered Nurse    Lorin Velazquez, ST Surgical Tech    Estela Domingo RN Registered Nurse           Additional  information:  Forceps:    Vacuum:    Breech:    Observed anomalies      Living?:     Apgars    Living status: Living  Apgar Component Scores:  1 min.:  5 min.:  10 min.:  15 min.:  20 min.:    Skin color:  1  1       Heart rate:  2  2       Reflex irritability:  2  2       Muscle tone:  2  2       Respiratory effort:  2  2       Total:  9  9       Apgars assigned by: FELISA MUARICIO RN         Placenta: Delivered:       appearance    Pending Diagnostic Studies:     Procedure Component Value Units Date/Time    RPR [[id number]] Collected: 10/03/23 0503    Order Status: Sent Lab Status: In process Updated: 10/03/23 0507    Specimen: Blood           Discharged Condition: good    Disposition: Home or Self Care    Follow Up:   Follow-up Information     Jada Jackson MD Follow up in 2 week(s).    Specialty: Obstetrics and Gynecology  Contact information:  [place] [address]  [phone]                       Patient Instructions:      Diet Adult Regular     Lifting restrictions     Pelvic Rest     No dressing needed     Notify your health care provider if you experience any of the following:  temperature >100.4     Notify your health care provider if you experience any of the following:  persistent nausea and vomiting or diarrhea     Notify your health care provider if you experience any of the following:  severe uncontrolled pain     Notify your health care provider if you experience any of the following:  redness, tenderness, or signs of infection (pain, swelling, redness, odor or green/yellow discharge around incision site)     Notify your health care provider if you experience any of the following:  difficulty breathing or increased cough     Medications:  Current Discharge  Medication List      START taking these medications    Details   ibuprofen (ADVIL,MOTRIN) 600 MG tablet Take 1 tablet (600 mg total) by mouth every 8 (eight) hours as needed for Pain.  Qty: 30 tablet, Refills: 0         CONTINUE these medications which have NOT CHANGED    Details   prenatal 105-iron-folic ac-dha 30 mg iron- 1.4 mg-300 mg Cmpk Take by mouth.      atorvastatin (LIPITOR) 20 MG tablet 1 tablet Orally Once a day for 30 day(s)      butalbitaL-acetaminophen  mg Tab 1 tablet as needed Orally every 8 hrs for 7 days      ezetimibe (ZETIA) 10 mg tablet 1 tablet Orally Once a day for 30 day(s)      lamoTRIgine (LAMICTAL) 25 MG tablet 1 tablet Orally Once a day for 30 day(s)      loperamide (IMODIUM) 2 mg capsule       metoclopramide HCl (REGLAN) 5 MG tablet Take 5 mg by mouth 4 (four) times daily.      pantoprazole (PROTONIX) 40 MG tablet Take 1 tablet (40 mg total) by mouth once daily.  Qty: 30 tablet, Refills: 2             Jada Jackson MD  Obstetrics  [place] - Labor & Delivery

## 2023-10-04 NOTE — PLAN OF CARE
Stable condition. VS WNL. Lungs clear. Pain is being managed with oral medications. Self nito-care and reports light vaginal bleeding. Fundus firm without massage. C/S incision covered with Aquacel dressing, dry and intact--no drainage noted. Abdominal binder in use for comfort. Dr. Moncada assessed pt this am. Ok to discharge home this evening. Mother smoked Marijuana during pregnancy.  Educated provided on BF and illegal drug use. Mother voiced understanding, and verbalized that she no longer smokes Marijuana. Voiding without difficulty. Tolerating regular diet and drinking adequate amounts of fluids. Bonding appropriately with .      LACTATION NOTE: BF well, no assistance needed per mother. LATCH score done per HITESH Adkins RN. See note from lactation nurse    Discharge instructions given. F/u with Dr. Moncada on 10/20 at 9:15 am. V/u. Received a copy of discharge paperwork including PMADS, Preeclampsia, and Save Your Life handouts.

## 2023-10-05 ENCOUNTER — SOCIAL WORK (OUTPATIENT)
Dept: CASE MANAGEMENT | Facility: HOSPITAL | Age: 33
End: 2023-10-05
Payer: MEDICAID

## 2023-10-06 NOTE — PROGRESS NOTES
called in the positive urine screen on mom to DCFS citing the medical prescription for marijuana.

## 2023-10-20 ENCOUNTER — POSTPARTUM VISIT (OUTPATIENT)
Dept: OBSTETRICS AND GYNECOLOGY | Facility: CLINIC | Age: 33
End: 2023-10-20
Payer: MEDICAID

## 2023-10-20 VITALS
HEART RATE: 81 BPM | BODY MASS INDEX: 38.59 KG/M2 | DIASTOLIC BLOOD PRESSURE: 78 MMHG | HEIGHT: 62 IN | SYSTOLIC BLOOD PRESSURE: 116 MMHG | OXYGEN SATURATION: 96 % | WEIGHT: 209.69 LBS | RESPIRATION RATE: 18 BRPM

## 2023-10-20 DIAGNOSIS — Z30.017 ENCOUNTER FOR INITIAL PRESCRIPTION OF IMPLANTABLE SUBDERMAL CONTRACEPTIVE: ICD-10-CM

## 2023-10-20 DIAGNOSIS — Z01.30 BLOOD PRESSURE CHECK: ICD-10-CM

## 2023-10-20 DIAGNOSIS — Z30.09 GENERAL COUNSELING AND ADVICE ON FEMALE CONTRACEPTION: ICD-10-CM

## 2023-10-20 PROCEDURE — 99999 PR PBB SHADOW E&M-EST. PATIENT-LVL III: CPT | Mod: PBBFAC,,, | Performed by: OBSTETRICS & GYNECOLOGY

## 2023-10-20 PROCEDURE — 96160 PT-FOCUSED HLTH RISK ASSMT: CPT | Mod: S$PBB,,, | Performed by: OBSTETRICS & GYNECOLOGY

## 2023-10-20 PROCEDURE — 99999 PR PBB SHADOW E&M-EST. PATIENT-LVL III: ICD-10-PCS | Mod: PBBFAC,,, | Performed by: OBSTETRICS & GYNECOLOGY

## 2023-10-20 PROCEDURE — 59430 PR CARE AFTER DELIVERY ONLY: ICD-10-PCS | Mod: ,,, | Performed by: OBSTETRICS & GYNECOLOGY

## 2023-10-20 PROCEDURE — 96160 PT-FOCUSED HLTH RISK ASSMT: CPT | Mod: PBBFAC | Performed by: OBSTETRICS & GYNECOLOGY

## 2023-10-20 PROCEDURE — 99213 OFFICE O/P EST LOW 20 MIN: CPT | Mod: PBBFAC,TH | Performed by: OBSTETRICS & GYNECOLOGY

## 2023-10-20 PROCEDURE — 96160 PR PT FOC HLTH RISK ASSMT, POST NATAL DEPRESSN SCRN (EPDS): ICD-10-PCS | Mod: S$PBB,,, | Performed by: OBSTETRICS & GYNECOLOGY

## 2023-10-20 RX ORDER — VILAZODONE HYDROCHLORIDE 20 MG/1
20 TABLET ORAL DAILY
Qty: 30 TABLET | Refills: 1 | Status: SHIPPED | OUTPATIENT
Start: 2023-10-20 | End: 2023-11-17

## 2023-10-20 NOTE — PROGRESS NOTES
CC: Post-partum follow-up    Cortez Radford is a 32 y.o. female  presents for a postpartum visit.  She is status post   2 weeks ago.  Her hospitalization was not complicated.  She is breastfeeding.  She desires Nexplanon for contraception.  She admits to postpartum depression; [place]  depression screening reviewed and 7. She and the baby are doing well.  No pain.  No fever.   No bowel / bladder complaints. Pregnancy was complicated by: none    Patient reports history of PPD; she does report having some days of worsening depression. She reports a history of being admitted for 8 days in a psych unit from her prior birth. She reports Lexapro and Paxil does not work well. She was on Viibryd and done very well on it.       Her last pap was 2022      ROS:  GENERAL: No fever, chills, fatigability.  VULVAR: No pain, no lesions and no itching.  VAGINAL: No relaxation, no itching, no discharge, no abnormal bleeding and no lesions.  ABDOMEN: No abdominal pain. Denies nausea. Denies vomiting. No diarrhea. No constipation  BREAST: Denies pain. No lumps. No discharge.  URINARY: No incontinence, no nocturia, no frequency and no dysuria.  CARDIOVASCULAR: No chest pain. No shortness of breath. No leg cramps.  NEUROLOGICAL: No headaches. No vision changes.    Past Medical History:   Diagnosis Date    Allergy     Anxiety disorder, unspecified     Depression     GERD (gastroesophageal reflux disease)     Thyroid dysfunction      Past Surgical History:   Procedure Laterality Date    ADENOIDECTOMY      ARTHROSCOPIC CHONDROPLASTY OF KNEE JOINT Right 10/24/2022    Procedure: ARTHROSCOPY, KNEE, WITH CHONDROPLASTY;  Surgeon: Jamel Pierson MD;  Location: [facility] OR;  Service: Orthopedics;  Laterality: Right;    CHOLECYSTECTOMY      KNEE ARTHROSCOPY W/ MENISCECTOMY Right 10/24/2022    Procedure: ARTHROSCOPY, KNEE, WITH MENISCECTOMY;  Surgeon: Jamel Pierson MD;  Location: [facility] OR;  Service: Orthopedics;   Laterality: Right;  LINVATEC CONFIRMED 10/21/DME    REPAIR OF MENISCUS OF KNEE  10/24/2022    Procedure: REPAIR, MENISCUS, KNEE;  Surgeon: Jamel Pierson MD;  Location: [facility];  Service: Orthopedics;;    Right knee medial meniscus repair  10/24/2022    TONSILLECTOMY       Review of patient's allergies indicates:  No Known Allergies    Current Outpatient Medications:     atorvastatin (LIPITOR) 20 MG tablet, 1 tablet Orally Once a day for 30 day(s), Disp: , Rfl:     butalbitaL-acetaminophen  mg Tab, 1 tablet as needed Orally every 8 hrs for 7 days, Disp: , Rfl:     ezetimibe (ZETIA) 10 mg tablet, 1 tablet Orally Once a day for 30 day(s), Disp: , Rfl:     ibuprofen (ADVIL,MOTRIN) 600 MG tablet, Take 1 tablet (600 mg total) by mouth every 8 (eight) hours as needed for Pain., Disp: 30 tablet, Rfl: 0    lamoTRIgine (LAMICTAL) 25 MG tablet, 1 tablet Orally Once a day for 30 day(s), Disp: , Rfl:     loperamide (IMODIUM) 2 mg capsule, , Disp: , Rfl:     metoclopramide HCl (REGLAN) 5 MG tablet, Take 5 mg by mouth 4 (four) times daily., Disp: , Rfl:     pantoprazole (PROTONIX) 40 MG tablet, Take 1 tablet (40 mg total) by mouth once daily., Disp: 30 tablet, Rfl: 2    prenatal 105-iron-folic ac-dha 30 mg iron- 1.4 mg-300 mg Cmpk, Take by mouth., Disp: , Rfl:     Current Facility-Administered Medications:     ondansetron disintegrating tablet 8 mg, 8 mg, Oral, Q8H PRN, Jada Jackson MD    oxytocin 30 units in 500 mL lactated ringers infusion (non-titrating), 334 alex-units/min, Intravenous, Once, Jada Jackson MD    oxytocin 30 units in 500 mL lactated ringers infusion (non-titrating), 334 alex-units/min, Intravenous, Once PRN, Jada Jackson MD    oxytocin 30 units in 500 mL lactated ringers infusion (non-titrating), 95 alex-units/min, Intravenous, Once PRN, Jada Jackson MD    oxytocin 30 units in 500 mL lactated ringers infusion (titrating), 0-30 alex-units/min,  Intravenous, Continuous, Jada Jackson MD    oxytocin 30 units in 500 mL lactated ringers infusion (titrating), 0-30 alex-units/min, Intravenous, Continuous, Jada Jackson MD    oxytocin injection 10 Units, 10 Units, Intramuscular, Once PRN, Jada Jackson MD    prochlorperazine injection Soln 5 mg, 5 mg, Intravenous, Q6H PRN, Jada Jackson MD    simethicone chewable tablet 80 mg, 1 tablet, Oral, QID PRN, Jada Jackson MD      Vitals:    10/20/23 0917   BP: 116/78   Pulse: 81   Resp: 18       PE: deferred    Assessment:    1.  (spontaneous vaginal delivery)        2. Blood pressure check        3. General counseling and advice on female contraception                Plan:    1. RTC in 4 weeks  2. Instructions / precautions reviewed  3. Contraceptive counseling; nexplanon  4. Rx of Viibryd

## 2023-11-17 ENCOUNTER — POSTPARTUM VISIT (OUTPATIENT)
Dept: OBSTETRICS AND GYNECOLOGY | Facility: CLINIC | Age: 33
End: 2023-11-17
Payer: MEDICAID

## 2023-11-17 VITALS
HEART RATE: 82 BPM | BODY MASS INDEX: 39.71 KG/M2 | OXYGEN SATURATION: 98 % | RESPIRATION RATE: 18 BRPM | DIASTOLIC BLOOD PRESSURE: 64 MMHG | SYSTOLIC BLOOD PRESSURE: 118 MMHG | WEIGHT: 215.81 LBS | HEIGHT: 62 IN

## 2023-11-17 DIAGNOSIS — Z01.30 BLOOD PRESSURE CHECK: ICD-10-CM

## 2023-11-17 DIAGNOSIS — Z30.017 NEXPLANON INSERTION: ICD-10-CM

## 2023-11-17 PROCEDURE — 99999 PR PBB SHADOW E&M-EST. PATIENT-LVL IV: CPT | Mod: PBBFAC,,, | Performed by: OBSTETRICS & GYNECOLOGY

## 2023-11-17 PROCEDURE — 99499 NO LOS: ICD-10-PCS | Mod: S$PBB,,, | Performed by: OBSTETRICS & GYNECOLOGY

## 2023-11-17 PROCEDURE — 99999PBSHW PR PBB SHADOW TECHNICAL ONLY FILED TO HB: ICD-10-PCS | Mod: PBBFAC,,,

## 2023-11-17 PROCEDURE — 99999 PR PBB SHADOW E&M-EST. PATIENT-LVL IV: ICD-10-PCS | Mod: PBBFAC,,, | Performed by: OBSTETRICS & GYNECOLOGY

## 2023-11-17 PROCEDURE — 99999PBSHW PR PBB SHADOW TECHNICAL ONLY FILED TO HB: Mod: PBBFAC,,,

## 2023-11-17 PROCEDURE — 11981 INSERTION DRUG DLVR IMPLANT: CPT | Mod: PBBFAC | Performed by: OBSTETRICS & GYNECOLOGY

## 2023-11-17 PROCEDURE — 99214 OFFICE O/P EST MOD 30 MIN: CPT | Mod: PBBFAC,TH | Performed by: OBSTETRICS & GYNECOLOGY

## 2023-11-17 PROCEDURE — 11981 INSERTION OF NEXPLANON: ICD-10-PCS | Mod: S$PBB,,, | Performed by: OBSTETRICS & GYNECOLOGY

## 2023-11-17 PROCEDURE — 99499 UNLISTED E&M SERVICE: CPT | Mod: S$PBB,,, | Performed by: OBSTETRICS & GYNECOLOGY

## 2023-11-17 RX ORDER — VILAZODONE HYDROCHLORIDE 40 MG/1
40 TABLET ORAL DAILY
Qty: 30 TABLET | Refills: 6 | Status: SHIPPED | OUTPATIENT
Start: 2023-11-17 | End: 2024-11-16

## 2023-11-17 RX ORDER — FERROUS SULFATE TAB 325 MG (65 MG ELEMENTAL FE) 325 (65 FE) MG
TAB ORAL EVERY OTHER DAY
COMMUNITY
Start: 2023-11-06

## 2023-11-17 RX ORDER — CALCIUM POLYCARBOPHIL 625 MG/1
2 TABLET ORAL 2 TIMES DAILY
COMMUNITY
Start: 2023-11-04

## 2023-11-17 RX ADMIN — ETONOGESTREL 68 MG: 68 IMPLANT SUBCUTANEOUS at 02:11

## 2023-11-17 NOTE — PROCEDURES
Insertion of Nexplanon    Date/Time: 11/17/2023 2:15 PM    Performed by: Jada Jackson MD  Authorized by: Jada Jackson MD    Consent:   Consent given by:  Patient  Patient questions answered: yes    Patient agrees, verbalizes understanding, and wants to proceed: yes    Instructions and paperwork completed: yes    Pre-Procedure:   Pre-procedure timeout performed: yes    Local anesthetic:  Lidocaine without epinephrine  The site was cleaned and prepped in a sterile fashion: yes    Insertion Procedure:   Small stab incision was made in arm: yes    Left/right:  right arm   68 mg etonogestreL 68 mg  Preloaded Implanon trocar was placed subdermally: yes    Visualization of implant was obtained: yes    Nexplanon was inserted and trocar removed: yes    Visualization of notch in stilette and palpitation of device: yes    Palpitation confirms placement by provider and patient: yes    Site was closed with steri-strips and pressure bandage applied: yes

## 2023-11-17 NOTE — PROGRESS NOTES
CC: Post-partum follow-up    Cortez Radford is a 32 y.o. female  presents for a postpartum visit.  She is status post   6 weeks ago.  Her hospitalization was not complicated.  She is breastfeeding via pumping.  She desires Nexplanon for contraception.  She admits to postpartum depression; [place]  depression screening reviewed and 8. She and the baby are doing well.  No pain.  No fever.   No bowel / bladder complaints. Pregnancy was complicated by: none    Patient reports history of PPD She reports a history of being admitted for 8 days in a psych unit from her prior birth. She was placed on Viibryd in the past; requesting increase in dosage.       Her last pap was 2022      ROS:  GENERAL: No fever, chills, fatigability.  VULVAR: No pain, no lesions and no itching.  VAGINAL: No relaxation, no itching, no discharge, no abnormal bleeding and no lesions.  ABDOMEN: No abdominal pain. Denies nausea. Denies vomiting. No diarrhea. No constipation  BREAST: Denies pain. No lumps. No discharge.  URINARY: No incontinence, no nocturia, no frequency and no dysuria.  CARDIOVASCULAR: No chest pain. No shortness of breath. No leg cramps.  NEUROLOGICAL: No headaches. No vision changes.    Past Medical History:   Diagnosis Date    Allergy     Anxiety disorder, unspecified     Depression     GERD (gastroesophageal reflux disease)     Thyroid dysfunction      Past Surgical History:   Procedure Laterality Date    ADENOIDECTOMY      ARTHROSCOPIC CHONDROPLASTY OF KNEE JOINT Right 10/24/2022    Procedure: ARTHROSCOPY, KNEE, WITH CHONDROPLASTY;  Surgeon: Jamel Pierson MD;  Location: [facility] OR;  Service: Orthopedics;  Laterality: Right;    CHOLECYSTECTOMY      KNEE ARTHROSCOPY W/ MENISCECTOMY Right 10/24/2022    Procedure: ARTHROSCOPY, KNEE, WITH MENISCECTOMY;  Surgeon: Jamel Pierson MD;  Location: [facility] OR;  Service: Orthopedics;  Laterality: Right;  LINVATEC CONFIRMED 10/21/DME    REPAIR OF MENISCUS OF KNEE   10/24/2022    Procedure: REPAIR, MENISCUS, KNEE;  Surgeon: Jamel Pierson MD;  Location: [facility] OR;  Service: Orthopedics;;    Right knee medial meniscus repair  10/24/2022    TONSILLECTOMY       Review of patient's allergies indicates:  No Known Allergies    Current Outpatient Medications:     atorvastatin (LIPITOR) 20 MG tablet, 1 tablet Orally Once a day for 30 day(s), Disp: , Rfl:     butalbitaL-acetaminophen  mg Tab, 1 tablet as needed Orally every 8 hrs for 7 days, Disp: , Rfl:     ezetimibe (ZETIA) 10 mg tablet, 1 tablet Orally Once a day for 30 day(s), Disp: , Rfl:     FEROSUL 325 mg (65 mg iron) Tab tablet, Take by mouth every other day., Disp: , Rfl:     FIBER THERAPY, CA POLYCARBOPH, 625 mg tablet, Take 2 tablets by mouth 2 (two) times daily., Disp: , Rfl:     ibuprofen (ADVIL,MOTRIN) 600 MG tablet, Take 1 tablet (600 mg total) by mouth every 8 (eight) hours as needed for Pain., Disp: 30 tablet, Rfl: 0    lamoTRIgine (LAMICTAL) 25 MG tablet, 1 tablet Orally Once a day for 30 day(s), Disp: , Rfl:     loperamide (IMODIUM) 2 mg capsule, , Disp: , Rfl:     metoclopramide HCl (REGLAN) 5 MG tablet, Take 5 mg by mouth 4 (four) times daily., Disp: , Rfl:     prenatal 105-iron-folic ac-dha 30 mg iron- 1.4 mg-300 mg Cmpk, Take by mouth., Disp: , Rfl:     pantoprazole (PROTONIX) 40 MG tablet, Take 1 tablet (40 mg total) by mouth once daily., Disp: 30 tablet, Rfl: 2    vilazodone (VIIBRYD) 40 mg Tab tablet, Take 1 tablet (40 mg total) by mouth once daily., Disp: 30 tablet, Rfl: 6    Current Facility-Administered Medications:     etonogestreL subdermal device 68 mg, 68 mg, Implant, , Jada Jackson MD, 68 mg at 11/17/23 1415    ondansetron disintegrating tablet 8 mg, 8 mg, Oral, Q8H PRN, Jada Jackson MD    oxytocin 30 units in 500 mL lactated ringers infusion (non-titrating), 334 alex-units/min, Intravenous, Once, Jada Jackson MD    oxytocin 30 units in 500 mL lactated ringers  infusion (non-titrating), 334 alex-units/min, Intravenous, Once PRN, Jada Jackson MD    oxytocin 30 units in 500 mL lactated ringers infusion (non-titrating), 95 alex-units/min, Intravenous, Once PRN, Jada Jackson MD    oxytocin 30 units in 500 mL lactated ringers infusion (titrating), 0-30 alex-units/min, Intravenous, Continuous, Jada Jackson MD    oxytocin 30 units in 500 mL lactated ringers infusion (titrating), 0-30 alex-units/min, Intravenous, Continuous, Jada Jackson MD    oxytocin injection 10 Units, 10 Units, Intramuscular, Once PRN, Jada Jackson MD    prochlorperazine injection Soln 5 mg, 5 mg, Intravenous, Q6H PRN, Jada Jackson MD    simethicone chewable tablet 80 mg, 1 tablet, Oral, QID PRN, Jada Jackson MD      Vitals:    23 1428   BP: 118/64   Pulse: 82   Resp: 18       PE: Pelvic exam: VULVA: normal appearing vulva with no masses, tenderness or lesions, VAGINA: normal appearing vagina with normal color and discharge, no lesions, CERVIX: normal appearing cervix without discharge or lesions, UTERUS: uterus is normal size, shape, consistency and nontender, ADNEXA: normal adnexa in size, nontender and no masses.      Assessment:    1.  (spontaneous vaginal delivery)        2. Blood pressure check        3. Nexplanon insertion  Insertion of Nexplanon Device    Insertion of Nexplanon    etonogestreL subdermal device 68 mg      4. Postpartum depression                Plan:    1. Release  2. Instructions / precautions reviewed  3. Nexplanon; see procedure note  4. Rx of Viibryd 40mg

## 2023-11-17 NOTE — PROGRESS NOTES
CC: Post-partum follow-up    Cortez Radford is a 32 y.o. female  presents for a postpartum visit.  She is status post   2 weeks ago.  Her hospitalization was not complicated.  She is breastfeeding.  She desires Nexplanon for contraception.  She admits to postpartum depression; [place]  depression screening reviewed and 7. She and the baby are doing well.  No pain.  No fever.   No bowel / bladder complaints. Pregnancy was complicated by: none    Patient reports history of PPD; she does report having some days of worsening depression. She reports a history of being admitted for 8 days in a psych unit from her prior birth. She reports Lexapro and Paxil does not work well. She was on Viibryd and done very well on it.       Her last pap was 2022      ROS:  GENERAL: No fever, chills, fatigability.  VULVAR: No pain, no lesions and no itching.  VAGINAL: No relaxation, no itching, no discharge, no abnormal bleeding and no lesions.  ABDOMEN: No abdominal pain. Denies nausea. Denies vomiting. No diarrhea. No constipation  BREAST: Denies pain. No lumps. No discharge.  URINARY: No incontinence, no nocturia, no frequency and no dysuria.  CARDIOVASCULAR: No chest pain. No shortness of breath. No leg cramps.  NEUROLOGICAL: No headaches. No vision changes.    Past Medical History:   Diagnosis Date    Allergy     Anxiety disorder, unspecified     Depression     GERD (gastroesophageal reflux disease)     Thyroid dysfunction      Past Surgical History:   Procedure Laterality Date    ADENOIDECTOMY      ARTHROSCOPIC CHONDROPLASTY OF KNEE JOINT Right 10/24/2022    Procedure: ARTHROSCOPY, KNEE, WITH CHONDROPLASTY;  Surgeon: Jamel Pierson MD;  Location: [facility] OR;  Service: Orthopedics;  Laterality: Right;    CHOLECYSTECTOMY      KNEE ARTHROSCOPY W/ MENISCECTOMY Right 10/24/2022    Procedure: ARTHROSCOPY, KNEE, WITH MENISCECTOMY;  Surgeon: Jamel Pierson MD;  Location: [facility] OR;  Service: Orthopedics;   Laterality: Right;  LINVATEC CONFIRMED 10/21/DME    REPAIR OF MENISCUS OF KNEE  10/24/2022    Procedure: REPAIR, MENISCUS, KNEE;  Surgeon: Jamel Pierson MD;  Location: [facility];  Service: Orthopedics;;    Right knee medial meniscus repair  10/24/2022    TONSILLECTOMY       Review of patient's allergies indicates:  No Known Allergies    Current Outpatient Medications:     atorvastatin (LIPITOR) 20 MG tablet, 1 tablet Orally Once a day for 30 day(s), Disp: , Rfl:     butalbitaL-acetaminophen  mg Tab, 1 tablet as needed Orally every 8 hrs for 7 days, Disp: , Rfl:     ezetimibe (ZETIA) 10 mg tablet, 1 tablet Orally Once a day for 30 day(s), Disp: , Rfl:     FEROSUL 325 mg (65 mg iron) Tab tablet, Take by mouth every other day., Disp: , Rfl:     FIBER THERAPY, CA POLYCARBOPH, 625 mg tablet, Take 2 tablets by mouth 2 (two) times daily., Disp: , Rfl:     ibuprofen (ADVIL,MOTRIN) 600 MG tablet, Take 1 tablet (600 mg total) by mouth every 8 (eight) hours as needed for Pain., Disp: 30 tablet, Rfl: 0    lamoTRIgine (LAMICTAL) 25 MG tablet, 1 tablet Orally Once a day for 30 day(s), Disp: , Rfl:     loperamide (IMODIUM) 2 mg capsule, , Disp: , Rfl:     metoclopramide HCl (REGLAN) 5 MG tablet, Take 5 mg by mouth 4 (four) times daily., Disp: , Rfl:     prenatal 105-iron-folic ac-dha 30 mg iron- 1.4 mg-300 mg Cmpk, Take by mouth., Disp: , Rfl:     pantoprazole (PROTONIX) 40 MG tablet, Take 1 tablet (40 mg total) by mouth once daily., Disp: 30 tablet, Rfl: 2    vilazodone (VIIBRYD) 40 mg Tab tablet, Take 1 tablet (40 mg total) by mouth once daily., Disp: 30 tablet, Rfl: 6    Current Facility-Administered Medications:     etonogestreL subdermal device 68 mg, 68 mg, Implant, , Jada Jackson MD, 68 mg at 11/17/23 1415    ondansetron disintegrating tablet 8 mg, 8 mg, Oral, Q8H PRN, Jada Jackson MD    oxytocin 30 units in 500 mL lactated ringers infusion (non-titrating), 334 alex-units/min, Intravenous,  Once, Jada Jackson MD    oxytocin 30 units in 500 mL lactated ringers infusion (non-titrating), 334 alex-units/min, Intravenous, Once PRN, Jada Jackson MD    oxytocin 30 units in 500 mL lactated ringers infusion (non-titrating), 95 alex-units/min, Intravenous, Once PRN, Jada Jackson MD    oxytocin 30 units in 500 mL lactated ringers infusion (titrating), 0-30 alex-units/min, Intravenous, Continuous, Jada Jackson MD    oxytocin 30 units in 500 mL lactated ringers infusion (titrating), 0-30 alex-units/min, Intravenous, Continuous, Jada Jackson MD    oxytocin injection 10 Units, 10 Units, Intramuscular, Once PRN, Jada Jackson MD    prochlorperazine injection Soln 5 mg, 5 mg, Intravenous, Q6H PRN, Jada Jackson MD    simethicone chewable tablet 80 mg, 1 tablet, Oral, QID PRN, Jada Jackson MD      Vitals:    23 1428   BP: 118/64   Pulse: 82   Resp: 18       PE: Pelvic exam: VULVA: normal appearing vulva with no masses, tenderness or lesions, VAGINA: normal appearing vagina with normal color and discharge, no lesions, CERVIX: normal appearing cervix without discharge or lesions, UTERUS: uterus is normal size, shape, consistency and nontender, ADNEXA: normal adnexa in size, nontender and no masses.      Assessment:    1.  (spontaneous vaginal delivery)        2. Blood pressure check        3. Nexplanon insertion  Insertion of Nexplanon Device    Insertion of Nexplanon    etonogestreL subdermal device 68 mg      4. Postpartum depression                Plan:    1. RTC in 4 weeks  2. Instructions / precautions reviewed  3. Contraceptive counseling; nexplanon  4. Rx of Viibryd

## 2023-12-07 ENCOUNTER — TELEPHONE (OUTPATIENT)
Dept: OBSTETRICS AND GYNECOLOGY | Facility: CLINIC | Age: 33
End: 2023-12-07
Payer: MEDICAID

## 2023-12-07 NOTE — TELEPHONE ENCOUNTER
Patient requesting another prescription to take in addition to the Viibryd. Patient states she called mental health to get appt but it isn't for a month. Patient requesting a prescription to help in the meantime.

## 2023-12-07 NOTE — TELEPHONE ENCOUNTER
----- Message from Cielo Cody MA sent at 12/7/2023  9:53 AM CST -----  Contact: self  Cortez Radford  MRN: [id number]  Home Phone      [phone]  Work Phone      Not on file.  Mobile          [phone]    Patient Care Team:  Gabrielle, Start as PCP - General (Internal Medicine)  Moises Lara MD as Obstetrician (Obstetrics and Gynecology)  Jada Jackson MD as Consulting Physician (Obstetrics and Gynecology)  OB? No  What phone number can you be reached at? [phone]  Message: Needs to speak to nurse regarding VIIBRYD medication.    Pharmacy:  [facility]

## 2023-12-08 NOTE — TELEPHONE ENCOUNTER
"Per  notified patient "Please have patient follow up with PCP then. I am not sure what can safely be taken with her other medications." Patient verbalized understanding.  "

## 2023-12-28 ENCOUNTER — TELEPHONE (OUTPATIENT)
Dept: OBSTETRICS AND GYNECOLOGY | Facility: CLINIC | Age: 33
End: 2023-12-28
Payer: MEDICAID

## 2023-12-28 NOTE — TELEPHONE ENCOUNTER
----- Message from Gaillauren Sheppard sent at 12/28/2023 11:32 AM CST -----  Contact: Self  Cortez Radford  MRN: [id number]  Home Phone      [phone]  Work Phone      Not on file.  Mobile          [phone]    Patient Care Team:  Gabrielle, Start as PCP - General (Internal Medicine)  Moises Lara MD as Obstetrician (Obstetrics and Gynecology)  Jada Jackson MD as Consulting Physician (Obstetrics and Gynecology)  OB? Yes  What phone number can you be reached at? [phone]  Message: pt states she is having trouble controlling her bladder

## 2023-12-28 NOTE — TELEPHONE ENCOUNTER
Attempted to contact patient, no answer. Unable to leave voicemail, mailbox full and can not accept calls.

## 2024-04-23 ENCOUNTER — OFFICE VISIT (OUTPATIENT)
Dept: OBSTETRICS AND GYNECOLOGY | Facility: CLINIC | Age: 34
End: 2024-04-23
Payer: MEDICAID

## 2024-04-23 VITALS
SYSTOLIC BLOOD PRESSURE: 122 MMHG | WEIGHT: 226.06 LBS | HEART RATE: 87 BPM | DIASTOLIC BLOOD PRESSURE: 64 MMHG | BODY MASS INDEX: 41.6 KG/M2 | RESPIRATION RATE: 16 BRPM | HEIGHT: 62 IN | OXYGEN SATURATION: 96 %

## 2024-04-23 DIAGNOSIS — N39.46 MIXED STRESS AND URGE URINARY INCONTINENCE: Primary | ICD-10-CM

## 2024-04-23 PROCEDURE — 99213 OFFICE O/P EST LOW 20 MIN: CPT | Mod: S$PBB,,, | Performed by: OBSTETRICS & GYNECOLOGY

## 2024-04-23 PROCEDURE — 1159F MED LIST DOCD IN RCRD: CPT | Mod: CPTII,,, | Performed by: OBSTETRICS & GYNECOLOGY

## 2024-04-23 PROCEDURE — 3008F BODY MASS INDEX DOCD: CPT | Mod: CPTII,,, | Performed by: OBSTETRICS & GYNECOLOGY

## 2024-04-23 PROCEDURE — 99999 PR PBB SHADOW E&M-EST. PATIENT-LVL III: CPT | Mod: PBBFAC,,, | Performed by: OBSTETRICS & GYNECOLOGY

## 2024-04-23 PROCEDURE — 3074F SYST BP LT 130 MM HG: CPT | Mod: CPTII,,, | Performed by: OBSTETRICS & GYNECOLOGY

## 2024-04-23 PROCEDURE — 3078F DIAST BP <80 MM HG: CPT | Mod: CPTII,,, | Performed by: OBSTETRICS & GYNECOLOGY

## 2024-04-23 PROCEDURE — 99213 OFFICE O/P EST LOW 20 MIN: CPT | Mod: PBBFAC | Performed by: OBSTETRICS & GYNECOLOGY

## 2024-04-23 RX ORDER — ARIPIPRAZOLE 5 MG/1
5 TABLET ORAL DAILY
COMMUNITY
Start: 2024-04-16

## 2024-04-23 RX ORDER — SOLIFENACIN SUCCINATE 5 MG/1
5 TABLET, FILM COATED ORAL DAILY
Qty: 30 TABLET | Refills: 2 | Status: SHIPPED | OUTPATIENT
Start: 2024-04-23 | End: 2025-04-23

## 2024-04-23 RX ORDER — VILAZODONE HYDROCHLORIDE 20 MG/1
20 TABLET ORAL DAILY
COMMUNITY

## 2024-04-23 RX ORDER — HYDROXYZINE PAMOATE 25 MG/1
25 CAPSULE ORAL 2 TIMES DAILY
COMMUNITY
Start: 2024-04-16

## 2024-04-23 NOTE — PROGRESS NOTES
Subjective:    Patient ID: Cortez Radford is a 33 y.o. y.o. female.     Chief Complaint:   Chief Complaint   Patient presents with    Urinary Incontinence       History of Present Illness:  Cortez presents today complaining of daily incontinence. She reports leakage with both increased valsalva as well as urgency. She reports having an odor secondary to the leakage.         ROS:   Review of Systems   Constitutional:  Negative for activity change, appetite change, chills, diaphoresis, fatigue, fever and unexpected weight change.   HENT:  Negative for mouth sores and tinnitus.    Eyes:  Negative for discharge and visual disturbance.   Respiratory:  Negative for cough, shortness of breath and wheezing.    Cardiovascular:  Negative for chest pain, palpitations and leg swelling.   Gastrointestinal:  Negative for abdominal pain, bloating, blood in stool, constipation, diarrhea, nausea and vomiting.   Endocrine: Negative for diabetes, hair loss, hot flashes, hyperthyroidism and hypothyroidism.   Genitourinary:  Positive for frequency, urgency and urinary incontinence. Negative for dysuria, flank pain, genital sores, hematuria, vaginal bleeding, vaginal discharge, vaginal pain, postcoital bleeding and vaginal odor.   Musculoskeletal:  Negative for back pain, joint swelling and myalgias.   Integumentary:  Negative for rash, acne, breast mass, nipple discharge and breast skin changes.   Neurological:  Negative for seizures, syncope, numbness and headaches.   Hematological:  Negative for adenopathy. Does not bruise/bleed easily.   Psychiatric/Behavioral:  Negative for depression and sleep disturbance. The patient is not nervous/anxious.    Breast: Negative for mass, mastodynia, nipple discharge and skin changes          Objective:    Vital Signs:  Vitals:    04/23/24 0822   BP: 122/64   Pulse: 87   Resp: 16       Physical Exam:  General:  alert, cooperative, no distress   Head Normocephalic, without obvious abnormality,  atraumatic   Neck .supple, symmetrical, trachea midline   Skin:  Skin color, texture, turgor normal. No rashes or lesions   Abdomen:  soft, non-tender; bowel sounds normal   Pelvis: External genitalia: normal general appearance  Urinary system: urethral meatus normal, bladder nontender; hypermobility of urethra  Vaginal: normal mucosa without prolapse or lesions     Extremities extremities normal, atraumatic, no cyanosis or edema   Neurologic Grossly normal          Problem List Items Addressed This Visit    None  Visit Diagnoses       Mixed stress and urge urinary incontinence    -  Primary    Relevant Medications    solifenacin (VESICARE) 5 MG tablet    Other Relevant Orders    Ambulatory referral/consult to Physical/Occupational Therapy

## 2024-05-01 PROBLEM — R53.1 WEAKNESS: Status: ACTIVE | Noted: 2024-05-01

## 2024-08-01 ENCOUNTER — TELEPHONE (OUTPATIENT)
Dept: OBSTETRICS AND GYNECOLOGY | Facility: CLINIC | Age: 34
End: 2024-08-01
Payer: MEDICAID

## 2024-08-01 NOTE — TELEPHONE ENCOUNTER
----- Message from Gail Sheppard sent at 8/1/2024  1:13 PM CDT -----  Contact: Self  Cortez Radford  MRN: [id number]  Home Phone      [phone]  Work Phone      Not on file.  Mobile          [phone]    Patient Care Team:  Gabrielle, Start as PCP - General (Internal Medicine)  Moises Lara MD as Obstetrician (Obstetrics and Gynecology)  Jada Jackson MD as Consulting Physician (Obstetrics and Gynecology)  OB? No  What phone number can you be reached at? [phone]  Message: needs to r/s appt for tomorrow for bladder lift consult

## 2024-08-14 ENCOUNTER — OFFICE VISIT (OUTPATIENT)
Dept: OBSTETRICS AND GYNECOLOGY | Facility: CLINIC | Age: 34
End: 2024-08-14
Payer: MEDICAID

## 2024-08-14 VITALS
HEIGHT: 62 IN | HEART RATE: 100 BPM | BODY MASS INDEX: 45.36 KG/M2 | SYSTOLIC BLOOD PRESSURE: 110 MMHG | RESPIRATION RATE: 20 BRPM | OXYGEN SATURATION: 98 % | DIASTOLIC BLOOD PRESSURE: 60 MMHG | WEIGHT: 246.5 LBS

## 2024-08-14 DIAGNOSIS — N39.46 MIXED STRESS AND URGE URINARY INCONTINENCE: Primary | ICD-10-CM

## 2024-08-14 PROCEDURE — 99214 OFFICE O/P EST MOD 30 MIN: CPT | Mod: PBBFAC | Performed by: OBSTETRICS & GYNECOLOGY

## 2024-08-14 PROCEDURE — 1159F MED LIST DOCD IN RCRD: CPT | Mod: CPTII,,, | Performed by: OBSTETRICS & GYNECOLOGY

## 2024-08-14 PROCEDURE — 3008F BODY MASS INDEX DOCD: CPT | Mod: CPTII,,, | Performed by: OBSTETRICS & GYNECOLOGY

## 2024-08-14 PROCEDURE — 3074F SYST BP LT 130 MM HG: CPT | Mod: CPTII,,, | Performed by: OBSTETRICS & GYNECOLOGY

## 2024-08-14 PROCEDURE — 99999 PR PBB SHADOW E&M-EST. PATIENT-LVL IV: CPT | Mod: PBBFAC,,, | Performed by: OBSTETRICS & GYNECOLOGY

## 2024-08-14 PROCEDURE — 3078F DIAST BP <80 MM HG: CPT | Mod: CPTII,,, | Performed by: OBSTETRICS & GYNECOLOGY

## 2024-08-14 PROCEDURE — 99213 OFFICE O/P EST LOW 20 MIN: CPT | Mod: S$PBB,,, | Performed by: OBSTETRICS & GYNECOLOGY

## 2024-08-14 RX ORDER — LAMOTRIGINE 25 MG/1
25 TABLET ORAL DAILY
COMMUNITY
Start: 2024-07-02

## 2024-08-14 RX ORDER — SOLIFENACIN SUCCINATE 10 MG/1
10 TABLET, FILM COATED ORAL DAILY
Qty: 30 TABLET | Refills: 6 | Status: SHIPPED | OUTPATIENT
Start: 2024-08-14 | End: 2025-08-14

## 2024-08-14 RX ORDER — CLONAZEPAM 0.5 MG/1
0.5 TABLET ORAL DAILY PRN
COMMUNITY
Start: 2024-07-31

## 2024-08-14 RX ORDER — MELOXICAM 7.5 MG/1
7.5 TABLET ORAL
COMMUNITY
Start: 2024-08-12

## 2024-08-14 RX ORDER — TOPIRAMATE 25 MG/1
25 TABLET ORAL DAILY
COMMUNITY
Start: 2024-08-12

## 2024-08-14 RX ORDER — CARIPRAZINE 1.5 MG/1
1.5 CAPSULE, GELATIN COATED ORAL DAILY
COMMUNITY
Start: 2024-07-29

## 2024-08-14 NOTE — PROGRESS NOTES
Subjective:    Patient ID: Cortez Radford is a 33 y.o. y.o. female.     Chief Complaint:   Chief Complaint   Patient presents with    Follow-up     Discuss getting bladder lift        History of Present Illness:  Cortez presents today for follow up. She has mixed urinary incontinence. She was started on Vesicare at the last visit and does report some improvement. She tried pelvic floor therapy. She reports she only went to one visit because it made her uncomfortable. She is interested in a sling.       ROS:   Review of Systems   Constitutional:  Negative for activity change, appetite change, chills, diaphoresis, fatigue, fever and unexpected weight change.   HENT:  Negative for mouth sores and tinnitus.    Eyes:  Negative for discharge and visual disturbance.   Respiratory:  Negative for cough, shortness of breath and wheezing.    Cardiovascular:  Negative for chest pain, palpitations and leg swelling.   Gastrointestinal:  Negative for abdominal pain, bloating, blood in stool, constipation, diarrhea, nausea and vomiting.   Endocrine: Negative for diabetes, hair loss, hot flashes, hyperthyroidism and hypothyroidism.   Genitourinary:  Positive for urinary incontinence. Negative for dysuria, flank pain, frequency, genital sores, hematuria, urgency, vaginal bleeding, vaginal discharge, vaginal pain, postcoital bleeding and vaginal odor.   Musculoskeletal:  Negative for back pain, joint swelling and myalgias.   Integumentary:  Negative for rash, acne, breast mass, nipple discharge and breast skin changes.   Neurological:  Negative for seizures, syncope, numbness and headaches.   Hematological:  Negative for adenopathy. Does not bruise/bleed easily.   Psychiatric/Behavioral:  Negative for depression and sleep disturbance. The patient is not nervous/anxious.    Breast: Negative for mass, mastodynia, nipple discharge and skin changes          Objective:    Vital Signs:  Vitals:    08/14/24 1438   BP: 110/60   Pulse: 100    Resp: 20       Physical Exam:  General:  alert, cooperative, no distress   Head Normocephalic, without obvious abnormality, atraumatic   Neck .supple, symmetrical, trachea midline   Skin:  Skin color, texture, turgor normal. No rashes or lesions   Abdomen:  soft, non-tender; bowel sounds normal   Pelvis: Deferred   Extremities extremities normal, atraumatic, no cyanosis or edema   Neurologic Grossly normal          Problem List Items Addressed This Visit    None  Visit Diagnoses       Mixed stress and urge urinary incontinence    -  Primary    Relevant Medications    solifenacin (VESICARE) 10 MG tablet    Other Relevant Orders    Ambulatory referral/consult to Urogynecology

## 2024-10-14 ENCOUNTER — TELEPHONE (OUTPATIENT)
Dept: ORTHOPEDICS | Facility: CLINIC | Age: 34
End: 2024-10-14
Payer: MEDICAID

## 2024-10-14 NOTE — TELEPHONE ENCOUNTER
Called and spoke with pt in regards to appointment for tomorrow for more info. Pt explained that she had knee surgery a year and a half to about 2 years ago and now it is hurting her again and causing issues. I explained to pt that I understand that it has almost been two years since her surgery that it would be best to continue with her surgeon who did it or to see and MD because Yumi is Physician Assistant and will not go against with whatever her doctor had set in place for her. Pt got very upset and cut me off to try and explain to her more but pt hung up the phone on me

## 2024-10-17 NOTE — PROGRESS NOTES
"Chief Complaint   Patient presents with    Urinary Leakage           Urinary Incontinence    Bladder prolapse        HPI: Patient is a 33 y.o. female  is referred by Dr. Ramirez. She presents today with c/o urinary incontinence. She has previously tried Vesicare and noted some improvement. She also attended pelvic floor PT but only one visit as it made her uncomfortable.     Patient states that symptoms of urinary incontinence have been occurring for about one year. She reports symptoms were worse immediately following the birth of her son 10/2023. Symptoms have gotten a little better since starting Vesicare 10 mg.     States that at times she can "feel her bladder" when she wipes hard and when she has intercourse. The bulge is not present all of the time. It is inside of the vagina and not outside. If she wipes aggressively like when she is on her menstrual cycle she notices it more. She is more bothered by the leakage of urine and that her sex life is different as she states he feels something different during intercourse.    Patient has some discomfort and pain with intercourse.     She reports that she will suddenly leak urine even when she does not have an urge to void. She additionally has some days where she voids frequently, 15-20 times due to the urge but may not actually void a large volume. She wakes once per night to void. She wears a panty liner over night and finds that on waking sometimes the liner will be wet.     She reports loss of urine with coughing, sneezing, and laughing. She states that the leakage occurs each time that she will sneeze which is a daily occur.     Drinking coffee, 4-5 cups per day and root beer, 20 ounces per day. May have one bottle of water per day.     She reports normal bowel movements with intermittent constipation. She is taking a stool softener every 3 days which has helped. She denies accidental bowel leakage.       Review of Systems   Constitutional:  " Negative for activity change, appetite change, chills, fatigue, fever and unexpected weight change.   HENT:  Negative for nasal congestion, dental problem, hearing loss, mouth dryness and sore throat.    Eyes:  Negative for pain and eye dryness.   Respiratory:  Negative for cough, shortness of breath and wheezing.    Cardiovascular:  Negative for chest pain, palpitations and leg swelling.   Gastrointestinal:  Negative for abdominal distention, abdominal pain, blood in stool, constipation and rectal pain.   Endocrine: Negative for cold intolerance and heat intolerance.   Genitourinary:  Negative for dyspareunia, hot flashes and vaginal dryness.   Musculoskeletal:  Negative for arthralgias, back pain, gait problem, joint swelling, myalgias, neck pain and neck stiffness.   Integumentary:  Negative for rash.   Neurological:  Negative for dizziness, tremors, seizures, speech difficulty, weakness, light-headedness, numbness and headaches.   Psychiatric/Behavioral:  Negative for confusion, dysphoric mood and sleep disturbance. The patient is not nervous/anxious.         Past Medical History:   Diagnosis Date    Allergy     Anxiety disorder, unspecified     Depression     GERD (gastroesophageal reflux disease)     Thyroid dysfunction        Past Surgical History:   Procedure Laterality Date    ADENOIDECTOMY      ARTHROSCOPIC CHONDROPLASTY OF KNEE JOINT Right 10/24/2022    Procedure: ARTHROSCOPY, KNEE, WITH CHONDROPLASTY;  Surgeon: Jamel Pierson MD;  Location: [facility] OR;  Service: Orthopedics;  Laterality: Right;    CHOLECYSTECTOMY      L/S    KNEE ARTHROSCOPY W/ MENISCECTOMY Right 10/24/2022    Procedure: ARTHROSCOPY, KNEE, WITH MENISCECTOMY;  Surgeon: Jamel Pierson MD;  Location: [facility] OR;  Service: Orthopedics;  Laterality: Right;  LINVATEC CONFIRMED 10/21/DME    REPAIR OF MENISCUS OF KNEE  10/24/2022    Procedure: REPAIR, MENISCUS, KNEE;  Surgeon: Jamel Pierson MD;  Location: [facility] OR;  Service: Orthopedics;;     Right knee medial meniscus repair  10/24/2022    TONSILLECTOMY           Current Outpatient Medications:     clonazePAM (KLONOPIN) 0.5 MG tablet, Take 0.5 mg by mouth daily as needed., Disp: , Rfl:     hydrOXYzine pamoate (VISTARIL) 25 MG Cap, Take 25 mg by mouth 2 (two) times daily., Disp: , Rfl:     LIDOcaine 4 % PtMd, Apply 1 patch topically once daily., Disp: 30 patch, Rfl: 0    meloxicam (MOBIC) 7.5 MG tablet, Take 7.5 mg by mouth as needed., Disp: , Rfl:     solifenacin (VESICARE) 10 MG tablet, Take 1 tablet (10 mg total) by mouth once daily., Disp: 30 tablet, Rfl: 6    topiramate (TOPAMAX) 25 MG tablet, Take 25 mg by mouth once daily., Disp: , Rfl:     vilazodone (VIIBRYD) 40 mg Tab tablet, Take 40 mg by mouth once daily., Disp: , Rfl:     VRAYLAR 1.5 mg Cap, Take 1.5 mg by mouth Daily., Disp: , Rfl:     lamoTRIgine (LAMICTAL) 25 MG tablet, Take 25 mg by mouth once daily. (Patient not taking: Reported on 10/18/2024), Disp: , Rfl:     mirabegron (MYRBETRIQ) 50 mg Tb24, Take 1 tablet (50 mg total) by mouth once daily., Disp: 90 tablet, Rfl: 0    pantoprazole (PROTONIX) 40 MG tablet, Take 1 tablet (40 mg total) by mouth once daily., Disp: 30 tablet, Rfl: 2    Current Facility-Administered Medications:     etonogestreL subdermal device 68 mg, 68 mg, Implant, , Jada Jackson MD, 68 mg at 11/17/23 1415    Review of patient's allergies indicates:  No Known Allergies    Family History   Problem Relation Name Age of Onset    Cancer Mother          ovarian cancer    Ovarian cancer Mother      Hypertension Father      Diabetes Father      Cancer Maternal Grandmother          Bladder and lung    Ovarian cancer Maternal Grandmother      Cervical cancer Maternal Aunt      Breast cancer Neg Hx      Uterine cancer Neg Hx         Social History     Socioeconomic History    Marital status:    Tobacco Use    Smoking status: Former     Current packs/day: 0.00     Average packs/day: 1 pack/day for 9.6 years  (9.6 ttl pk-yrs)     Types: Cigarettes, Vaping with nicotine     Start date: 2014     Quit date:      Years since quittin.7    Smokeless tobacco: Never    Tobacco comments:     Quit about 3 months ago. Now vaping.    Substance and Sexual Activity    Alcohol use: Not Currently     Comment: once every few months    Drug use: Yes     Frequency: 1.0 times per week     Types: Marijuana     Comment: once a week, prescription- 2 hits three times per day for anxiety and depression    Sexual activity: Yes     Partners: Male     Birth control/protection: Implant     Comment: has 20 month old baby   Social History Narrative    Lives with spouse, his parents and two children. Feels safe in her home.      Social Drivers of Health     Financial Resource Strain: Low Risk  (10/4/2023)    Overall Financial Resource Strain (CARDIA)     Difficulty of Paying Living Expenses: Not very hard   Food Insecurity: No Food Insecurity (10/4/2023)    Hunger Vital Sign     Worried About Running Out of Food in the Last Year: Never true     Ran Out of Food in the Last Year: Never true   Transportation Needs: No Transportation Needs (10/4/2023)    PRAPARE - Transportation     Lack of Transportation (Medical): No     Lack of Transportation (Non-Medical): No   Physical Activity: Inactive (10/4/2023)    Exercise Vital Sign     Days of Exercise per Week: 1 day     Minutes of Exercise per Session: 0 min   Stress: No Stress Concern Present (10/4/2023)    [employer] - Occupational Stress Questionnaire     Feeling of Stress : Not at all   Housing Stability: Low Risk  (10/4/2023)    Housing Stability Vital Sign     Unable to Pay for Housing in the Last Year: No     Number of Places Lived in the Last Year: 2     Unstable Housing in the Last Year: No       OB History          3    Para   3    Term   3            AB        Living   3         SAB        IAB        Ectopic        Multiple   0    Live Births    3                 Gyn History    Mammogram: n/a  Pap smear: 2/21/22 negative, HPV negative  LMP: Patient's last menstrual period was 10/07/2024.   Postmenopausal bleeding: n/a      INITIAL PHYSICAL EXAMINATION    Vitals:    10/18/24 0807   BP: 106/70   Pulse: 63      General: Healthy in appearance, Well nourished, Affect Normal, NAD.  Neck: Supple, No masses, Trachea midline, Thyroid normal size,  non-tender, no masses or nodules.  Nodes: No clavicular/cervical adenopathy.  Skin: Normal temperature, No atypical lesions or rash.  Heart: Normal sounds, no murmurs  Lungs: Normal respiratory effort.  Gastrointestinal: Non tender, Non distended, No masses, guarding or  rebound, No hepatosplenomegally, No hernia.  Ext: No clubbing, cyanosis, edema or varicosities.  DTR's: 2+ bilaterally  Strength 5/5 bilateral upper and lower extremities    Genitourinary- (Verbal consent obtained; Female chaperone present during the pelvic exam)    Vulva: normal without lesions, masses, atrophy or pain  Urethra: meatus central and normal in appearance, no prolapse/caruncle, no masses or discharge. Empty supine stress test was negative.  Bladder: non-tender, no masses  Vagina: No discharge or lesions, no atrophy, no masses appreciated.  [See POP-Q]  Cervix: no lesions, no discharge  Uterus:  non-tender, anteverted, approximately 6 weeks size  Adnexa: no masses, non tender.  Rectal: deferred   Neuro: S2-4- pin prick and light touch intact, Anal wink present  Levator strength: 0/5  Levator tenderness: left 5/10 and right 6/10    POP-Q Exam- pelvic organ prolapse quantitative    Aa -2.5  Anterior Wall Ba -2.5  Anterior wall C -5.5  Cervix or cuff   Gh 4  Genital hiatus pb 3  perineal body tvl 9.5  Total vaginal length   Ap -2.5  Posterior wall Bp -2.5  Posterior wall D -9  Posterior fornix     with Uterus    POP-Q Stage:1      Office Visit on 10/18/2024   Component Date Value Ref Range Status    Glucose, UA 10/18/2024 Neg   Final     Bilirubin, POC 10/18/2024 Neg   Final    Ketones, UA 10/18/2024 Neg   Final    Spec Grav UA 10/18/2024 1.015   Final    Blood, UA 10/18/2024 Neg   Final    pH, UA 10/18/2024 5   Final    Protein, POC 10/18/2024 Neg   Final    Urobilinogen, UA 10/18/2024 Neg   Final    Nitrite, UA 10/18/2024 Neg   Final    WBC, UA 10/18/2024 Neg   Final    Color, UA 10/18/2024 Yellow   Final    Clarity, UA 10/18/2024 Clear   Final    POC Residual Urine Volume 10/18/2024 0  0 - 100 mL Final        ASSESSMENT & PLAN:    Myofascial pain  -     Ambulatory referral/consult to Physical/Occupational Therapy; Future; Expected date: 10/25/2024    Urge incontinence  -     Ambulatory referral/consult to Urogynecology  -     POCT URINE DIPSTICK WITHOUT MICROSCOPE  -     POCT Bladder Scan  -     Ambulatory referral/consult to Physical/Occupational Therapy; Future; Expected date: 10/25/2024  -     mirabegron (MYRBETRIQ) 50 mg Tb24; Take 1 tablet (50 mg total) by mouth once daily.  Dispense: 90 tablet; Refill: 0    Urinary urgency    Urinary frequency    Stress incontinence  -     Ambulatory referral/consult to Physical/Occupational Therapy; Future; Expected date: 10/25/2024    Mixed stress and urge urinary incontinence    Smoker       Exam findings and treatment options were discussed in detail with the patient. Her symptoms are consistent with mixed urinary incontinence and myofascial pain of the pelvic floor. We spent time in discussion today of the differences between UUI and JOSUÉ. We reviewed treatment options of each type of incontinence as well, discussing in detail that for UUI she can try conservative measures such as bladder retraining, PT or medications and for JOSUÉ options such as PT, Revive - OTC vaginal insert, pessary use or surgery. Discussed that weight loss will also help with both symptoms.   We additionally discussed the impact of myofascial pain on bladder symptoms and sexual activity. Reviewed that PT again would address this  problem.     Recommended she start with PFPT for the mixed incontinence and myofascial pain. Will add Myrbetriq to the Vesicare to help with the OAB/ urgency urinary incontinence symptoms. Reviewed fluid titration through the day and restriction two hours before bedtime. Side effects were discussed. Advised to call the office if there is any issue with obtaining the medication or the cost of the medication.     Reviewed that in terms of surgery for stress urinary incontinence I will not typically place a mesh sling unless someone has stopped smoking for a minimum of 6 months due to the higher risk of erosions and infections. We can however consider periurethral bulking with Bulkamid.     Patient to return for re-evaluation of her OAB symptoms in about 2-3 months.     All questions were answered today. The patient was encouraged to contact the office as needed with any additional questions or concerns.     Total time spent on visit was 51 minutes.  This includes face to face time and non-face to face time preparing to see the patient (eg, review of tests), Obtaining and/or reviewing separately obtained history, Documenting clinical information in the electronic or other health record, Independently interpreting resultsand communicating results to the patient/family/caregiver, or Care coordination.    Ailyn Velazquez MD

## 2024-10-18 ENCOUNTER — OFFICE VISIT (OUTPATIENT)
Dept: UROGYNECOLOGY | Facility: CLINIC | Age: 34
End: 2024-10-18
Payer: MEDICAID

## 2024-10-18 VITALS
WEIGHT: 251.13 LBS | HEIGHT: 62 IN | HEART RATE: 63 BPM | SYSTOLIC BLOOD PRESSURE: 106 MMHG | DIASTOLIC BLOOD PRESSURE: 70 MMHG | BODY MASS INDEX: 46.21 KG/M2

## 2024-10-18 DIAGNOSIS — N39.3 STRESS INCONTINENCE: ICD-10-CM

## 2024-10-18 DIAGNOSIS — R39.15 URINARY URGENCY: ICD-10-CM

## 2024-10-18 DIAGNOSIS — F17.200 SMOKER: ICD-10-CM

## 2024-10-18 DIAGNOSIS — M79.18 MYOFASCIAL PAIN: Primary | ICD-10-CM

## 2024-10-18 DIAGNOSIS — R35.0 URINARY FREQUENCY: ICD-10-CM

## 2024-10-18 DIAGNOSIS — N39.46 MIXED STRESS AND URGE URINARY INCONTINENCE: ICD-10-CM

## 2024-10-18 DIAGNOSIS — N39.41 URGE INCONTINENCE: ICD-10-CM

## 2024-10-18 LAB
BILIRUB SERPL-MCNC: NORMAL MG/DL
BLOOD URINE, POC: NORMAL
CLARITY, POC UA: CLEAR
COLOR, POC UA: YELLOW
GLUCOSE UR QL STRIP: NORMAL
KETONES UR QL STRIP: NORMAL
LEUKOCYTE ESTERASE URINE, POC: NORMAL
NITRITE, POC UA: NORMAL
PH, POC UA: 5
POC RESIDUAL URINE VOLUME: 0 ML (ref 0–100)
PROTEIN, POC: NORMAL
SPECIFIC GRAVITY, POC UA: 1.01
UROBILINOGEN, POC UA: NORMAL

## 2024-10-18 PROCEDURE — 99999 PR PBB SHADOW E&M-EST. PATIENT-LVL IV: CPT | Mod: PBBFAC,,, | Performed by: OBSTETRICS & GYNECOLOGY

## 2024-10-18 PROCEDURE — 99214 OFFICE O/P EST MOD 30 MIN: CPT | Mod: PBBFAC | Performed by: OBSTETRICS & GYNECOLOGY

## 2024-10-18 RX ORDER — MIRABEGRON 50 MG/1
50 TABLET, EXTENDED RELEASE ORAL DAILY
Qty: 90 TABLET | Refills: 0 | Status: SHIPPED | OUTPATIENT
Start: 2024-10-18 | End: 2025-01-16

## 2024-10-19 DIAGNOSIS — N39.46 MIXED STRESS AND URGE URINARY INCONTINENCE: ICD-10-CM

## 2024-10-20 RX ORDER — SOLIFENACIN SUCCINATE 5 MG/1
5 TABLET, FILM COATED ORAL
Qty: 30 TABLET | Refills: 0 | OUTPATIENT
Start: 2024-10-20

## 2024-10-20 NOTE — TELEPHONE ENCOUNTER
Refill Decision Note   Cortez Radford  is requesting a refill authorization.  Brief Assessment and Rationale for Refill:  Quick Discontinue     Medication Therapy Plan:  discontinued on 8/14/2024 by Jada Jackson MD. Dose increase to 10mg.      Comments:     Note composed:11:41 AM 10/20/2024

## 2024-12-09 NOTE — PROGRESS NOTES
Patient ID: Cortez Radford is a 31 y.o. female    Chief Complaint:   Chief Complaint   Patient presents with    Right Knee - Pain       History of Present Illness:    Cortez Radford is a pleasant 31 y.o. female who presents for evaluation of right knee pain. She  reports pain for the past 6 months. She does endorse a history of trauma.  She sustained a fall onto her right knee back in May.  She had x-rays at that time which were negative.  Prolonged walking, sitting to standing and squats makes it worse and nothing makes it better. The pain is mostly medial and posterior. She does endorse nighttime pain.   She is independent with all activities of daily living.     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    Instability: No  Mechanical Symptoms: Yes    Diabetic:  No  Smoker:  Yes          PAST MEDICAL HISTORY:   Past Medical History:   Diagnosis Date    Allergy     Anxiety disorder, unspecified     Depression     GERD (gastroesophageal reflux disease)     Thyroid dysfunction      PAST SURGICAL HISTORY:   Past Surgical History:   Procedure Laterality Date    ADENOIDECTOMY      CHOLECYSTECTOMY      TONSILLECTOMY       FAMILY HISTORY:   Family History   Problem Relation Age of Onset    Cancer Mother         ovarian cancer    Hypertension Father     Diabetes Father      SOCIAL HISTORY:   Social History     Occupational History    Not on file   Tobacco Use    Smoking status: Every Day     Packs/day: 1.00     Types: Cigarettes     Start date: 6/8/2014    Smokeless tobacco: Never   Substance and Sexual Activity    Alcohol use: Yes     Comment: once every few months    Drug use: Yes     Frequency: 1.0 times per week     Types: Marijuana     Comment: once a week    Sexual activity: Yes     Partners: Male     Comment: has 20 month old baby        MEDICATIONS:   Current Outpatient Medications:     ARIPiprazole (ABILIFY) 2 MG Tab, , Disp:  Per KELIN Brantley, \"CT with stable findings of cirrhosis, small ascites, and small chronic PVT, unchanged from prior. No suspicious liver lesions. Gallbladder stones without evidence of biliary dilation. Also noted complex left renal cyst - forward to PCP for review. If patient is feeling well without new symptoms of jaundice, pruritus, or abdominal pain, can follow up as scheduled next month with repeat labs. Will defer ERCP for now.\" Called and spoke with Annalisa, patient's caregiver. Reviewed results above and she verbalized understanding. Patient was seen by PCP Friday and had no complaints of pain or itching, no worsening jaundice. Patient will follow up as planned in January. Will forward renal findings to PCP.    , Rfl:     budesonide-formoterol 160-4.5 mcg (SYMBICORT) 160-4.5 mcg/actuation HFAA, Inhale into the lungs., Disp: , Rfl:     buPROPion (WELLBUTRIN XL) 150 MG TB24 tablet, , Disp: , Rfl:     butalbital-acetaminophen-caffeine -40 mg (FIORICET, ESGIC) -40 mg per tablet, TAKE 1 TABLET BY MOUTH EVERY 4 HOURS AS NEEDED FOR PAIN AND OR HEADACHES, Disp: 30 tablet, Rfl: 0    diclofenac sodium (VOLTAREN) 1 % Gel, Apply 2 g topically 4 (four) times daily., Disp: 100 g, Rfl: 0    diphenoxylate-atropine 2.5-0.025 mg (LOMOTIL) 2.5-0.025 mg per tablet, TAKE 1 TABLET BY MOUTH TWICE DAILY AS NEEDED FOR DIARRHEA, Disp: , Rfl:     lamoTRIgine (LAMICTAL) 25 MG tablet, , Disp: , Rfl:     loratadine (CLARITIN) 10 mg tablet, Take 10 mg by mouth once daily., Disp: , Rfl:     mirabegron (MYRBETRIQ) 25 mg Tb24 ER tablet, Take 1 tablet (25 mg total) by mouth once daily., Disp: 30 tablet, Rfl: 11    ondansetron (ZOFRAN-ODT) 4 MG TbDL, DISSOLVE 1 TABLET IN MOUTH EVERY 6 TO 8 HOURS AS NEEDED FOR NAUSEA, Disp: , Rfl:     OXcarbazepine (TRILEPTAL) 150 MG Tab, Take 150 mg by mouth 2 (two) times daily., Disp: , Rfl:     oxybutynin (DITROPAN XL) 15 MG TR24, Take 15 mg by mouth once daily., Disp: , Rfl:     pantoprazole (PROTONIX) 40 MG tablet, Take 1 tablet (40 mg total) by mouth once daily., Disp: 30 tablet, Rfl: 2    predniSONE (DELTASONE) 20 MG tablet, , Disp: , Rfl:     topiramate (TOPAMAX) 25 MG tablet, Take 25 mg by mouth once daily., Disp: , Rfl:     vilazodone (VIIBRYD) 40 mg Tab tablet, Take 40 mg by mouth once daily., Disp: , Rfl:   ALLERGIES: Review of patient's allergies indicates:  No Known Allergies      Physical Exam     Vitals:    10/03/22 0948   BP: 99/71   Pulse: 67     Alert and oriented to person, place and time. No acute distress. Well-groomed, not ill appearing. Pupils round and reactive, normal respiratory effort, no audible wheezing.     OBSERVATION / INSPECTION   Gait:   Nonantalgic   Alignment:  Neutral    Scars:   None   Muscle atrophy: None  Effusion:  Positive  Warmth:  None   Discoloration:   None     TENDERNESS                 Patella     Negative    Peripatellar medial    positive   Peripatellar lateral   Negative   Patellar tendon   Negative   Quad tendon     Negative    Prepatellar Bursa   Negative     Tibial tubercle    Negative    Pes anserine/HS   Negative      ITB     Negative      LCL     Negative  MFC     Negative  LFC     Negative  MCL      Negative  Medial Joint Line    positive   Lateral Joint Line   Negative  Quadriceps    Negative  Hamstring     Negative            Range of  Motion:          Right :    0-130°      Patellofemoral examination:     Patella position    Centered  Crepitus (PF):    Absent   Lateral tilt:    Normal   J-sign:     None   Patellofemoral grind:   No pain       MENISCAL SIGNS:     Pain on terminal extension:  Positive  Pain on terminal flexion:  Positive  Ibrahimas maneuver:  Positive medial    LIGAMENT EXAMINATION:  ACL / Lachman:  normal   PCL-Post.  drawer: normal 0 to 2mm  MCL- Valgus:  normal 0 to 2mm  LCL- Varus:    normal 0 to 2mm    Dial Test: difference c/w other side  At 30° flexion: normal (< 5°)   At 90° flexion: normal (< 5°)       STRENGTH: (* = with pain)   Quadricep   5/5  Hamstrin/5    EXTREMITY NEURO-VASCULAR EXAMINATION:   Sensation:  Grossly intact to light touch all dermatomal regions.   Motor Function:  Fully intact motor function at hip, knee, foot and ankle    DTRs;  quadriceps and  achilles 2+.  No clonus and downgoing Babinski.    Vascular status:  DP and PT pulses 2+, brisk capillary refill, symmetric.          Imaging:     3 view  right knee X-rays ordered/reviewed by me showing no evidence of fracture or dislocation. There is no obvious malalignment. No evidence of masses, lesions or foreign bodies.             Assessment & Plan    Chronic pain of right knee         Treatment options were discussed with her in detail.  She has right knee  pain for the past 6 months after a traumatic fall.  She has attempted bracing, anti-inflammatories and home exercise program without any relief.  She has mechanical symptoms and sharp stabbing pain mostly on the medial aspect of her knee.  I am concerned for acute meniscus tear.  We discussed MRI to evaluate the intra-articular structures.  She is in agreement with this plan.  We will order MRI of the right knee and she will follow up for results.    Follow up: for MRI/CT Results   X-rays next visit:  None

## 2025-08-27 ENCOUNTER — CLINICAL SUPPORT (OUTPATIENT)
Dept: REHABILITATION | Facility: HOSPITAL | Age: 35
End: 2025-08-27
Payer: MEDICAID

## 2025-08-27 DIAGNOSIS — M54.41 ACUTE BILATERAL LOW BACK PAIN WITH RIGHT-SIDED SCIATICA: Primary | ICD-10-CM

## 2025-08-27 PROCEDURE — 97161 PT EVAL LOW COMPLEX 20 MIN: CPT | Mod: PN

## 2025-08-27 PROCEDURE — 97110 THERAPEUTIC EXERCISES: CPT | Mod: PN
